# Patient Record
Sex: FEMALE | Race: WHITE | NOT HISPANIC OR LATINO | Employment: FULL TIME | URBAN - METROPOLITAN AREA
[De-identification: names, ages, dates, MRNs, and addresses within clinical notes are randomized per-mention and may not be internally consistent; named-entity substitution may affect disease eponyms.]

---

## 2020-04-06 ENCOUNTER — CLINICAL SUPPORT (OUTPATIENT)
Dept: OTHER | Facility: CLINIC | Age: 45
End: 2020-04-06
Attending: NURSE PRACTITIONER
Payer: COMMERCIAL

## 2020-04-06 DIAGNOSIS — R50.9 FEVER, UNSPECIFIED FEVER CAUSE: ICD-10-CM

## 2020-04-06 DIAGNOSIS — R50.9 FEVER, UNSPECIFIED FEVER CAUSE: Primary | ICD-10-CM

## 2020-04-06 NOTE — PROGRESS NOTES
Patient was processed today at [facility] drive-up clinic.  Pt denies any sort of medical distress today.  After identifying the patient, a nasopharyngeal sample was obtained and placed in viral transport medium.  Pt was given a post-collection education sheet and encouraged to self-isolate until they receive the results.  Pt directed to [facility] website for [facility] Privacy Practices.  Sample sent to the lab noted on the order and requisition.  Pt was without additional questions or concerns and was dispositioned from the testing area to home.

## 2020-04-09 ENCOUNTER — TELEPHONE (OUTPATIENT)
Dept: OBSTETRICS AND GYNECOLOGY | Facility: CLINIC | Age: 45
End: 2020-04-09

## 2020-04-09 LAB
QUEST OVERALL RESULT:: NOT DETECTED
SARS-COV RNA SPEC QL NAA+PROBE: NEGATIVE
SARS-COV-2 RNA RESP QL NAA+PROBE: NEGATIVE
SPECIMEN SOURCE: NORMAL
SYMPTOM: YES

## 2020-04-09 NOTE — TELEPHONE ENCOUNTER
----- Message from Maritza Singleton PT sent at 4/9/2020  8:41 AM EDT -----  I have been unable to reach the patient to relay Negative COVID results. Left message for patient to call back for results.

## 2020-04-09 NOTE — TELEPHONE ENCOUNTER
Called pt and informed her of negative COVID test. Pt is not a pt of mine or the practice. She was very excited by the good news.

## 2020-04-09 NOTE — RESULT ENCOUNTER NOTE
Received message from Dr. Monreal that she contacted patient regarding negative results.   Patient is not a patient at her practice.

## 2020-04-09 NOTE — RESULT ENCOUNTER NOTE
I have been unable to reach the patient to relay Negative COVID results. Left message for patient to call back for results.

## 2020-08-21 ENCOUNTER — TELEPHONE (OUTPATIENT)
Dept: INFECTIOUS DISEASES | Facility: HOSPITAL | Age: 45
End: 2020-08-21

## 2020-08-21 DIAGNOSIS — R50.9 FEVER, UNSPECIFIED FEVER CAUSE: Primary | ICD-10-CM

## 2020-08-21 NOTE — TELEPHONE ENCOUNTER
Please schedule this patient for Covid 19 testing.  I have updated the patient's demographic information with the patient's contact information for scheduling.    Patient having symptoms?: yes  If yes, list symptoms:Fever, Nausea/diarrhea, Aches, Headache and Sore throat    The provider will be placing the order in Epic: no  If no, provider was directed to fax the order to [phone]: Yes    Ordering provider (First - Last): Dr. Mercedes Vega   Provider Best Callback #: [phone]  Fax number (If provider wants results and does not use In Basket): [phone]     This patient is a [employer] Employee: YES/NO/NA: No

## 2020-08-24 ENCOUNTER — CLINICAL SUPPORT (OUTPATIENT)
Dept: OTHER | Facility: CLINIC | Age: 45
End: 2020-08-24
Attending: NURSE PRACTITIONER
Payer: COMMERCIAL

## 2020-08-24 DIAGNOSIS — R50.9 FEVER, UNSPECIFIED FEVER CAUSE: ICD-10-CM

## 2020-08-24 NOTE — PROGRESS NOTES
Patient was processed today at [facility]-19 drive-up clinic.  Pt denies any sort of medical distress today.  After identifying the patient, a nasopharyngeal sample was obtained and placed in viral transport medium.  Pt was given a post-collection education sheet and encouraged to self-isolate until they receive the results.  Pt directed to [facility] website for [facility] Privacy Practices.  Sample sent to the lab noted on the order and requisition.  Pt was without additional questions or concerns and was dispositioned from the testing area to home.

## 2020-08-26 LAB — SARS-COV-2 RNA RESP QL NAA+PROBE: NOT DETECTED

## 2021-03-31 DIAGNOSIS — Z23 ENCOUNTER FOR IMMUNIZATION: ICD-10-CM

## 2021-06-22 ENCOUNTER — TRANSCRIBE ORDERS (OUTPATIENT)
Dept: SCHEDULING | Age: 46
End: 2021-06-22

## 2021-06-22 DIAGNOSIS — R22.9 LOCALIZED SWELLING, MASS AND LUMP, UNSPECIFIED: Primary | ICD-10-CM

## 2021-06-28 ENCOUNTER — HOSPITAL ENCOUNTER (OUTPATIENT)
Dept: RADIOLOGY | Facility: HOSPITAL | Age: 46
Discharge: HOME | End: 2021-06-28
Attending: GENERAL PRACTICE
Payer: COMMERCIAL

## 2021-06-28 DIAGNOSIS — R22.9 LOCALIZED SWELLING, MASS AND LUMP, UNSPECIFIED: ICD-10-CM

## 2021-06-28 PROCEDURE — 76536 US EXAM OF HEAD AND NECK: CPT

## 2021-06-29 ENCOUNTER — TRANSCRIBE ORDERS (OUTPATIENT)
Dept: SCHEDULING | Age: 46
End: 2021-06-29

## 2021-06-29 DIAGNOSIS — R93.89 ABNORMAL FINDINGS ON DIAGNOSTIC IMAGING OF OTHER SPECIFIED BODY STRUCTURES: ICD-10-CM

## 2021-06-29 DIAGNOSIS — R22.9 LOCALIZED SWELLING, MASS AND LUMP, UNSPECIFIED: Primary | ICD-10-CM

## 2021-06-30 ENCOUNTER — TRANSCRIBE ORDERS (OUTPATIENT)
Dept: SCHEDULING | Age: 46
End: 2021-06-30

## 2021-06-30 ENCOUNTER — APPOINTMENT (OUTPATIENT)
Dept: RADIOLOGY | Age: 46
End: 2021-06-30
Payer: COMMERCIAL

## 2021-06-30 ENCOUNTER — HOSPITAL ENCOUNTER (OUTPATIENT)
Dept: RADIOLOGY | Facility: HOSPITAL | Age: 46
Discharge: HOME | End: 2021-06-30
Attending: FAMILY MEDICINE
Payer: COMMERCIAL

## 2021-06-30 VITALS — HEIGHT: 67 IN | BODY MASS INDEX: 22.29 KG/M2 | WEIGHT: 142 LBS

## 2021-06-30 DIAGNOSIS — R93.89 ABNORMAL FINDINGS ON DIAGNOSTIC IMAGING OF OTHER SPECIFIED BODY STRUCTURES: ICD-10-CM

## 2021-06-30 DIAGNOSIS — R22.9 LOCALIZED SWELLING, MASS AND LUMP, UNSPECIFIED: ICD-10-CM

## 2021-06-30 DIAGNOSIS — J45.909 UNSPECIFIED ASTHMA, UNCOMPLICATED: Primary | ICD-10-CM

## 2021-07-13 ENCOUNTER — TRANSCRIBE ORDERS (OUTPATIENT)
Dept: REGISTRATION | Facility: HOSPITAL | Age: 46
End: 2021-07-13

## 2021-07-13 ENCOUNTER — HOSPITAL ENCOUNTER (OUTPATIENT)
Dept: RADIOLOGY | Facility: HOSPITAL | Age: 46
Discharge: HOME | End: 2021-07-13
Attending: GENERAL PRACTICE
Payer: COMMERCIAL

## 2021-07-13 DIAGNOSIS — Z12.31 ENCOUNTER FOR SCREENING MAMMOGRAM FOR MALIGNANT NEOPLASM OF BREAST: Primary | ICD-10-CM

## 2021-07-13 DIAGNOSIS — Z12.31 ENCOUNTER FOR SCREENING MAMMOGRAM FOR MALIGNANT NEOPLASM OF BREAST: ICD-10-CM

## 2021-07-13 PROCEDURE — 77063 BREAST TOMOSYNTHESIS BI: CPT

## 2022-02-08 ENCOUNTER — TRANSCRIBE ORDERS (OUTPATIENT)
Dept: SCHEDULING | Age: 47
End: 2022-02-08

## 2022-02-08 DIAGNOSIS — R51.9 HEADACHE, UNSPECIFIED: Primary | ICD-10-CM

## 2022-02-09 ENCOUNTER — HOSPITAL ENCOUNTER (EMERGENCY)
Facility: HOSPITAL | Age: 47
Discharge: HOME | End: 2022-02-09
Attending: EMERGENCY MEDICINE
Payer: COMMERCIAL

## 2022-02-09 ENCOUNTER — APPOINTMENT (EMERGENCY)
Dept: RADIOLOGY | Facility: HOSPITAL | Age: 47
End: 2022-02-09
Payer: COMMERCIAL

## 2022-02-09 VITALS
HEART RATE: 72 BPM | SYSTOLIC BLOOD PRESSURE: 109 MMHG | TEMPERATURE: 98.9 F | BODY MASS INDEX: 22.76 KG/M2 | DIASTOLIC BLOOD PRESSURE: 51 MMHG | RESPIRATION RATE: 18 BRPM | WEIGHT: 145 LBS | OXYGEN SATURATION: 99 % | HEIGHT: 67 IN

## 2022-02-09 DIAGNOSIS — R51.9 NONINTRACTABLE HEADACHE, UNSPECIFIED CHRONICITY PATTERN, UNSPECIFIED HEADACHE TYPE: Primary | ICD-10-CM

## 2022-02-09 LAB
ALBUMIN SERPL-MCNC: 4.1 G/DL (ref 3.4–5)
ALP SERPL-CCNC: 45 IU/L (ref 35–126)
ALT SERPL-CCNC: 33 IU/L (ref 11–54)
ANION GAP SERPL CALC-SCNC: 10 MEQ/L (ref 3–15)
AST SERPL-CCNC: 29 IU/L (ref 15–41)
BASOPHILS # BLD: 0.05 K/UL (ref 0.01–0.1)
BASOPHILS NFR BLD: 1 %
BILIRUB SERPL-MCNC: 0.4 MG/DL (ref 0.3–1.2)
BUN SERPL-MCNC: 17 MG/DL (ref 8–20)
CALCIUM SERPL-MCNC: 9 MG/DL (ref 8.9–10.3)
CHLORIDE SERPL-SCNC: 104 MEQ/L (ref 98–109)
CO2 SERPL-SCNC: 24 MEQ/L (ref 22–32)
CREAT SERPL-MCNC: 0.7 MG/DL (ref 0.6–1.1)
DIFFERENTIAL METHOD BLD: ABNORMAL
EOSINOPHIL # BLD: 0.04 K/UL (ref 0.04–0.36)
EOSINOPHIL NFR BLD: 0.8 %
ERYTHROCYTE [DISTWIDTH] IN BLOOD BY AUTOMATED COUNT: 11.9 % (ref 11.7–14.4)
GFR SERPL CREATININE-BSD FRML MDRD: >60 ML/MIN/1.73M*2
GLUCOSE SERPL-MCNC: 98 MG/DL (ref 70–99)
HCG UR QL: NEGATIVE
HCT VFR BLDCO AUTO: 41 % (ref 35–45)
HGB BLD-MCNC: 14.3 G/DL (ref 11.8–15.7)
IMM GRANULOCYTES # BLD AUTO: 0.01 K/UL (ref 0–0.08)
IMM GRANULOCYTES NFR BLD AUTO: 0.2 %
LYMPHOCYTES # BLD: 2.45 K/UL (ref 1.2–3.5)
LYMPHOCYTES NFR BLD: 48.4 %
MCH RBC QN AUTO: 34.8 PG (ref 28–33.2)
MCHC RBC AUTO-ENTMCNC: 34.9 G/DL (ref 32.2–35.5)
MCV RBC AUTO: 99.8 FL (ref 83–98)
MONOCYTES # BLD: 0.34 K/UL (ref 0.28–0.8)
MONOCYTES NFR BLD: 6.7 %
NEUTROPHILS # BLD: 2.17 K/UL (ref 1.7–7)
NEUTS SEG NFR BLD: 42.9 %
NRBC BLD-RTO: 0 %
PDW BLD AUTO: 9.8 FL (ref 9.4–12.3)
PLATELET # BLD AUTO: 235 K/UL (ref 150–369)
POTASSIUM SERPL-SCNC: 4.1 MEQ/L (ref 3.6–5.1)
PROT SERPL-MCNC: 7.1 G/DL (ref 6–8.2)
RBC # BLD AUTO: 4.11 M/UL (ref 3.93–5.22)
SARS-COV-2 RNA RESP QL NAA+PROBE: NEGATIVE
SODIUM SERPL-SCNC: 138 MEQ/L (ref 136–144)
WBC # BLD AUTO: 5.06 K/UL (ref 3.8–10.5)

## 2022-02-09 PROCEDURE — U0002 COVID-19 LAB TEST NON-CDC: HCPCS | Performed by: PHYSICIAN ASSISTANT

## 2022-02-09 PROCEDURE — 96361 HYDRATE IV INFUSION ADD-ON: CPT

## 2022-02-09 PROCEDURE — 80053 COMPREHEN METABOLIC PANEL: CPT | Performed by: PHYSICIAN ASSISTANT

## 2022-02-09 PROCEDURE — 36415 COLL VENOUS BLD VENIPUNCTURE: CPT | Performed by: PHYSICIAN ASSISTANT

## 2022-02-09 PROCEDURE — 96375 TX/PRO/DX INJ NEW DRUG ADDON: CPT

## 2022-02-09 PROCEDURE — 25800000 HC PHARMACY IV SOLUTIONS: Performed by: PHYSICIAN ASSISTANT

## 2022-02-09 PROCEDURE — 63600000 HC DRUGS/DETAIL CODE: Performed by: PHYSICIAN ASSISTANT

## 2022-02-09 PROCEDURE — 3E033GC INTRODUCTION OF OTHER THERAPEUTIC SUBSTANCE INTO PERIPHERAL VEIN, PERCUTANEOUS APPROACH: ICD-10-PCS | Performed by: EMERGENCY MEDICINE

## 2022-02-09 PROCEDURE — 70450 CT HEAD/BRAIN W/O DYE: CPT | Mod: MG

## 2022-02-09 PROCEDURE — 63600000 HC DRUGS/DETAIL CODE: Mod: JW | Performed by: PHYSICIAN ASSISTANT

## 2022-02-09 PROCEDURE — 99284 EMERGENCY DEPT VISIT MOD MDM: CPT | Mod: 25

## 2022-02-09 PROCEDURE — 85025 COMPLETE CBC W/AUTO DIFF WBC: CPT | Performed by: PHYSICIAN ASSISTANT

## 2022-02-09 PROCEDURE — 3E023GC INTRODUCTION OF OTHER THERAPEUTIC SUBSTANCE INTO MUSCLE, PERCUTANEOUS APPROACH: ICD-10-PCS | Performed by: EMERGENCY MEDICINE

## 2022-02-09 PROCEDURE — 93005 ELECTROCARDIOGRAM TRACING: CPT | Performed by: PHYSICIAN ASSISTANT

## 2022-02-09 PROCEDURE — 96372 THER/PROPH/DIAG INJ SC/IM: CPT | Mod: 59

## 2022-02-09 PROCEDURE — 3E0333Z INTRODUCTION OF ANTI-INFLAMMATORY INTO PERIPHERAL VEIN, PERCUTANEOUS APPROACH: ICD-10-PCS | Performed by: EMERGENCY MEDICINE

## 2022-02-09 PROCEDURE — 84703 CHORIONIC GONADOTROPIN ASSAY: CPT | Performed by: PHYSICIAN ASSISTANT

## 2022-02-09 PROCEDURE — 3E0337Z INTRODUCTION OF ELECTROLYTIC AND WATER BALANCE SUBSTANCE INTO PERIPHERAL VEIN, PERCUTANEOUS APPROACH: ICD-10-PCS | Performed by: EMERGENCY MEDICINE

## 2022-02-09 PROCEDURE — 96374 THER/PROPH/DIAG INJ IV PUSH: CPT

## 2022-02-09 RX ORDER — KETOROLAC TROMETHAMINE 30 MG/ML
15 INJECTION, SOLUTION INTRAMUSCULAR; INTRAVENOUS ONCE
Status: COMPLETED | OUTPATIENT
Start: 2022-02-09 | End: 2022-02-09

## 2022-02-09 RX ORDER — DEXAMETHASONE SODIUM PHOSPHATE 4 MG/ML
10 INJECTION, SOLUTION INTRA-ARTICULAR; INTRALESIONAL; INTRAMUSCULAR; INTRAVENOUS; SOFT TISSUE ONCE
Status: COMPLETED | OUTPATIENT
Start: 2022-02-09 | End: 2022-02-09

## 2022-02-09 RX ORDER — DIPHENHYDRAMINE HCL 50 MG/ML
25 VIAL (ML) INJECTION ONCE
Status: COMPLETED | OUTPATIENT
Start: 2022-02-09 | End: 2022-02-09

## 2022-02-09 RX ORDER — SUMATRIPTAN 6 MG/.5ML
6 INJECTION, SOLUTION SUBCUTANEOUS ONCE
Status: COMPLETED | OUTPATIENT
Start: 2022-02-09 | End: 2022-02-09

## 2022-02-09 RX ORDER — METOCLOPRAMIDE HYDROCHLORIDE 5 MG/ML
10 INJECTION INTRAMUSCULAR; INTRAVENOUS ONCE
Status: COMPLETED | OUTPATIENT
Start: 2022-02-09 | End: 2022-02-09

## 2022-02-09 RX ADMIN — METOCLOPRAMIDE HYDROCHLORIDE 10 MG: 5 INJECTION INTRAMUSCULAR; INTRAVENOUS at 21:38

## 2022-02-09 RX ADMIN — SODIUM CHLORIDE 1000 ML: 9 INJECTION, SOLUTION INTRAVENOUS at 21:40

## 2022-02-09 RX ADMIN — SUMATRIPTAN 6 MG: 6 INJECTION, SOLUTION SUBCUTANEOUS at 21:39

## 2022-02-09 RX ADMIN — DIPHENHYDRAMINE HYDROCHLORIDE 25 MG: 50 INJECTION INTRAMUSCULAR; INTRAVENOUS at 21:38

## 2022-02-09 RX ADMIN — KETOROLAC TROMETHAMINE 15 MG: 30 INJECTION, SOLUTION INTRAMUSCULAR at 23:31

## 2022-02-09 RX ADMIN — DEXAMETHASONE SODIUM PHOSPHATE 10 MG: 4 INJECTION, SOLUTION INTRA-ARTICULAR; INTRALESIONAL; INTRAMUSCULAR; INTRAVENOUS; SOFT TISSUE at 23:32

## 2022-02-10 ENCOUNTER — HOSPITAL ENCOUNTER (OUTPATIENT)
Dept: RADIOLOGY | Facility: HOSPITAL | Age: 47
Discharge: HOME | End: 2022-02-10
Attending: PHYSICIAN ASSISTANT
Payer: COMMERCIAL

## 2022-02-10 DIAGNOSIS — R51.9 HEADACHE, UNSPECIFIED: ICD-10-CM

## 2022-02-10 LAB
ATRIAL RATE: 66
P AXIS: 74
PR INTERVAL: 160
QRS DURATION: 84
QT INTERVAL: 428
QTC CALCULATION(BAZETT): 448
R AXIS: 80
T WAVE AXIS: 62
VENTRICULAR RATE: 66

## 2022-02-10 RX ORDER — GADOBUTROL 604.72 MG/ML
6.6 INJECTION INTRAVENOUS ONCE
Status: COMPLETED | OUTPATIENT
Start: 2022-02-10 | End: 2022-02-10

## 2022-02-10 RX ADMIN — GADOBUTROL 6.6 ML: 604.72 INJECTION INTRAVENOUS at 09:24

## 2022-02-10 ASSESSMENT — ENCOUNTER SYMPTOMS
SEIZURES: 0
ARTHRALGIAS: 0
PALPITATIONS: 0
COUGH: 0
DYSURIA: 0
SHORTNESS OF BREATH: 0
VOMITING: 0
FEVER: 1
ABDOMINAL PAIN: 0
EYE PAIN: 0
HEADACHES: 1
CHILLS: 0
BACK PAIN: 0
COLOR CHANGE: 0
SORE THROAT: 0
NECK STIFFNESS: 1
NECK PAIN: 1
HEMATURIA: 0

## 2022-02-10 NOTE — ED PROVIDER NOTES
Emergency Medicine Note  HPI   HISTORY OF PRESENT ILLNESS     HPI     This is a 46-year-old female with self-reported history of ADHD who presents for evaluation of a headache x2 weeks.  The patient notes that she does not typically get headaches and cannot recall what she was doing with onset of headache.  Patient notes she had a low-grade temperature of 99.2 °F about 2 weeks ago and a possible Covid exposure from a neighbor.  She took x4 rapid Covid test at home all of which were negative and had a negative PCR test about 1 week ago.  The patient notes last week she had a teledoc appointment and her provider prescribed Fioricet which helped her symptoms 6 days ago.  However, the patient notes that she wakes up every day with a headache which she describes as an 8 out of 10 constant soreness and stiffness on the top of her head and the crown of the head.  Patient notes although Fioricet was helping her previously, she took a dose at 1 PM today and 7:30 PM today without relief of her symptoms.  She does also endorse some neck stiffness and soreness.    Patient denies any present fever/chills, chest pain, shortness of breath, rash, photophobia, sensitivity to sounds, speech disturbance, visual disturbance, numbness, weakness, difficulty ambulating, abdominal pain, nausea, vomiting, diarrhea, rash, any other symptoms.    She does have an outpatient MRI of her brain scheduled for tomorrow.      Patient History   PAST HISTORY     Reviewed from Nursing Triage:  Tobacco  Allergies  Meds  Problems  Med Hx  Surg Hx  Fam Hx  Soc   Hx      History reviewed. No pertinent past medical history.    History reviewed. No pertinent surgical history.    History reviewed. No pertinent family history.    Social History     Tobacco Use   • Smoking status: Never Smoker   • Smokeless tobacco: Never Used   Substance Use Topics   • Alcohol use: Never   • Drug use: Never         Review of Systems   REVIEW OF SYSTEMS     Review of  Systems   Constitutional: Positive for fever. Negative for chills.   HENT: Negative for ear pain and sore throat.    Eyes: Negative for pain and visual disturbance.   Respiratory: Negative for cough and shortness of breath.    Cardiovascular: Negative for chest pain and palpitations.   Gastrointestinal: Negative for abdominal pain and vomiting.   Genitourinary: Negative for dysuria and hematuria.   Musculoskeletal: Positive for neck pain and neck stiffness. Negative for arthralgias and back pain.   Skin: Negative for color change and rash.   Neurological: Positive for headaches. Negative for seizures and syncope.   All other systems reviewed and are negative.        VITALS     ED Vitals    Date/Time Temp Pulse Resp BP SpO2 [facility]   02/09/22 2318 37.2 °C (98.9 °F) 72 18 109/51 99 % FFS   02/09/22 2100 37.2 °C (98.9 °F) 74 18 127/86 100 % JPC   02/09/22 1848 37.4 °C (99.3 °F) 78 20 126/78 100 % SLS        Pulse Ox %: 98 % (02/09/22 2027)  Pulse Ox Interpretation: Normal (02/09/22 2027)  Heart Rate: 78 (02/09/22 2027)  Rhythm Strip Interpretation: Normal Sinus Rhythm (02/09/22 2027)     Physical Exam   PHYSICAL EXAM     Physical Exam  Vitals and nursing note reviewed.   Constitutional:       Appearance: She is well-developed.   HENT:      Head: Normocephalic and atraumatic.   Eyes:      Extraocular Movements: Extraocular movements intact.      Conjunctiva/sclera: Conjunctivae normal.      Pupils: Pupils are equal, round, and reactive to light.   Neck:      Comments: No meningismus  Cardiovascular:      Rate and Rhythm: Normal rate and regular rhythm.   Pulmonary:      Effort: Pulmonary effort is normal.      Breath sounds: Normal breath sounds.   Abdominal:      General: There is no distension.      Palpations: Abdomen is soft. There is no mass.      Tenderness: There is no abdominal tenderness.   Musculoskeletal:         General: No tenderness or deformity. Normal range of motion.      Cervical back: Normal range of  motion and neck supple. No tenderness.   Skin:     General: Skin is warm and dry.      Findings: No rash.   Neurological:      General: No focal deficit present.      Mental Status: She is alert and oriented to person, place, and time. Mental status is at baseline.      Comments: Mental status: A/Ox3  CN II-XII tested and intact.  Sensation intact to sharp/dull differentiation in all extremities.  Motor: Normal tone and bulk. No abnormal movements appreciated. No pronator drift. Strength tested and 5/5 in bilateral wrist flexion/extension, elbow flexion/extension, shoulder abduction, straight leg raise, knee flexion/extension, ankle dorsiflexion/plantarflexion. Patient ambulates with a steady gait.  Coordination: Finger to nose and heel to shin testing intact bilaterally.   Psychiatric:         Behavior: Behavior normal.           PROCEDURES     Procedures     DATA     Results     Procedure Component Value Units Date/Time    [place] Draw Panel [[id number]] Collected: 02/09/22 1854    Specimen: Blood, Venous Updated: 02/10/22 0300    Narrative:      The following orders were created for panel order [place] Draw Panel.  Procedure                               Abnormality         Status                     ---------                               -----------         ------                     RAINBOW RED[[id number]]                                        Final result               RAINBOW LT BLUE[[id number]]                                    Final result               RAINBOW GOLD[[id number]]                                       Final result                 Please view results for these tests on the individual orders.    RAINBOW RED [[id number]] Collected: 02/09/22 1854    Specimen: Blood, Venous Updated: 02/10/22 0300    RAINBOW LT BLUE [[id number]] Collected: 02/09/22 1854    Specimen: Blood, Venous Updated: 02/10/22 0300    RAINBOW GOLD [[id number]] Collected: 02/09/22 1854    Specimen: Blood, Venous Updated: 02/10/22 0300     SARS-CoV-2 (COVID-19), PCR Nasopharynx [[id number]]  (Normal) Collected: 02/09/22 2124    Specimen: Nasopharyngeal Swab from Nasopharynx Updated: 02/09/22 2203    Narrative:      The following orders were created for panel order SARS-CoV-2 (COVID-19), PCR Nasopharynx.  Procedure                               Abnormality         Status                     ---------                               -----------         ------                     SARS-CoV-2 (COVID-19), P...[[id number]]  Normal              Final result                 Please view results for these tests on the individual orders.    SARS-CoV-2 (COVID-19), PCR Nasopharynx [[id number]]  (Normal) Collected: 02/09/22 2124    Specimen: Nasopharyngeal Swab from Nasopharynx Updated: 02/09/22 2203     SARS-CoV-2 (COVID-19) Negative    BhCG, Serum, Qual [[id number]]  (Normal) Collected: 02/09/22 1854    Specimen: Blood, Venous Updated: 02/09/22 2103     Preg Test, Serum Negative    Comprehensive metabolic panel [[id number]]  (Normal) Collected: 02/09/22 1854    Specimen: Blood, Venous Updated: 02/09/22 1920     Sodium 138 mEQ/L      Potassium 4.1 mEQ/L      Comment: Results obtained on plasma. Plasma Potassium values may be up to 0.4 mEQ/L less than serum values. The differences may be greater for patients with high platelet or white cell counts.        Chloride 104 mEQ/L      CO2 24 mEQ/L      BUN 17 mg/dL      Creatinine 0.7 mg/dL      Glucose 98 mg/dL      Calcium 9.0 mg/dL      AST (SGOT) 29 IU/L      ALT (SGPT) 33 IU/L      Alkaline Phosphatase 45 IU/L      Total Protein 7.1 g/dL      Comment: Test performed on plasma which typically contains approximately 0.4 g/dL more protein than serum.        Albumin 4.1 g/dL      Bilirubin, Total 0.4 mg/dL      eGFR >60.0 mL/min/1.73m*2      Anion Gap 10 mEQ/L     CBC and differential [[id number]]  (Abnormal) Collected: 02/09/22 1854    Specimen: Blood, Venous Updated: 02/09/22 1906     WBC 5.06 K/uL      RBC 4.11 M/uL      Hemoglobin  14.3 g/dL      Hematocrit 41.0 %      MCV 99.8 fL      MCH 34.8 pg      MCHC 34.9 g/dL      RDW 11.9 %      Platelets 235 K/uL      MPV 9.8 fL      Differential Type Auto     nRBC 0.0 %      Immature Granulocytes 0.2 %      Neutrophils 42.9 %      Lymphocytes 48.4 %      Monocytes 6.7 %      Eosinophils 0.8 %      Basophils 1.0 %      Immature Granulocytes, Absolute 0.01 K/uL      Neutrophils, Absolute 2.17 K/uL      Lymphocytes, Absolute 2.45 K/uL      Monocytes, Absolute 0.34 K/uL      Eosinophils, Absolute 0.04 K/uL      Basophils, Absolute 0.05 K/uL           Imaging Results          CT HEAD WITHOUT IV CONTRAST (Final result)  Result time 02/09/22 20:33:54    Final result                 Impression:    IMPRESSION:  No evidence for acute hemorrhage, mass, mass effect, or acute territorial  infarction.             Narrative:    CLINICAL HISTORY: Persistent dizziness.    COMMENT: CT examination of the brain is performed without intravenous contrast  administration utilizing 2.5 mm axial sections.    CT DOSE:  The kV and/or mA were adjusted according to the patient's size and  body part for CT dose reduction    There are no prior examinations available for comparison.    The ventricles, sulci, and basal cisterns are normal in size, shape, and  configuration.  There is no gross loss of gray-white matter discrimination to  suggest an acute large vascular territory infarction.  Atherosclerotic  intracranial vascular calcifications are noted.  There is no evidence for  intraparenchymal hemorrhage, mass-effect, or midline shift. There are no  extra-axial fluid collection. The visualized portions of the paranasal sinuses  and mastoid air cells are clear. The orbits are within normal limits.                                ECG 12 lead   ED Interpretation   Reviewed with attending. RH    Rhythm: [NSR]  Rate: 66  P waves: [normal interval]  QRS: [normal QRS]  Axis: [normal]  ST Segments: [no obvious ST elevation or  ischemia]    Impression: Normal sinus rhythm; normal ECG      Final Result          Scoring tools                                 ED Course & MDM   MDM / ED COURSE / CLINICAL IMPRESSIONS / DISPO     [facility]    ED Course as of 02/10/22 1549   Wed Feb 09, 2022 2052 CT HEAD WITHOUT IV CONTRAST  IMPRESSION:  No evidence for acute hemorrhage, mass, mass effect, or acute territorial  infarction. [RH]   2102 Discussed with charge RN; pt will get bed for meds [RH]   2304 H/a much improved. Pt would like to be discharged home. Pt presents with headache. No focal neurological symptoms. Neuro exam is benign. Pt is nontoxic. VSS.    Based on history and normal neurological exam I have low suspicion for intracranial tumor, intracranial bleed, meningitis, temporal arteritis, glaucoma, CO poisoning, other emergent/life-threatening causes.    Most likely patient has benign headache, recommend rest, hydration, and over the counter pain medication.  Prior to noncontrast head CT imaging, I did discuss risk of radiation versus benefits of imaging and although patient has an MRI scheduled for tomorrow morning, she is amenable to CT scan imaging tonight knowing risks of radiation. Attending also evaluated patient.    Disposition: Discharge home with strict return precautions and instructions for prompt primary care and headache specialist follow up. [RH]      ED Course User Index  [RH] Dafne Buitrago PA C         Clinical Impressions as of 02/10/22 1549   Nonintractable headache, unspecified chronicity pattern, unspecified headache type     Discharge         Dafne Buitrago PA C  02/10/22 1543

## 2022-02-10 NOTE — DISCHARGE INSTRUCTIONS
Please call your primary care doctor and the headache specialist today to inform them of your ER visit today and arrange a follow up appointment within 2 days. Please return immediately to the emergency department for any new/worsening or concerning symptoms. We offered to continue observing you and give more medications for your headache; however, you would like to be discharged at this time and feel improvement in your symptoms.

## 2022-02-13 NOTE — ED ATTESTATION NOTE
Procedures  Physical Exam  Review of Systems    2/13/202212:08 PM  I have personally seen and examined the patient.  I reviewed and agree with the PA/NP/Resident's assessment and plan of care.    My examination, assessment, and plan of care of Chel Price is as follows:  The patient presents with hx of ADHD p/w ongoing HA x 2 weeks. No falls/ trauma. No fevers. Had recent negative COVID test. Pt is scheduled for MRI tomorrow. Has been taking fioricet for headaches without relief. No vision changes, numbness, or weakness.  Exam: vitals wnl, RRR, CTAB, neck supple, abd s/nt/nd. Equal pulses. No edema. No rashes. nonfocal neuro exam.  Impression/Plan: Suspect tension headache vs migraine. Do not suspect CNS infection based on history/ exam. Doubt viral syndrome. Doubt CVA/ malignancy. Plan head CT, migraine cocktail and plan outpatient PCP f/u, MRI as planned if workup here is wnl and headache improves.    Vital Signs Review: Vital signs have been reviewed. The oxygen saturation is  SpO2: 100 % which is  wnl.    I was physically present for the key/critical portions of the following procedures: None    This document was created using dragon dictation software.  There might be some typographical errors due to this technology.    We are in a pandemic with increased volumes and decreased capacity.      Vern Houston MD  02/13/22 [id number]

## 2022-05-23 ENCOUNTER — TELEPHONE (OUTPATIENT)
Dept: ADMISSIONS | Facility: HOSPITAL | Age: 47
End: 2022-05-23
Payer: COMMERCIAL

## 2022-05-23 NOTE — TELEPHONE ENCOUNTER
[facility] Initial Intake    Chel Price, a 46 y.o. female.  [facility] Intake           General  Reason for seeking services (in client's own words)?: Pt is struggling she goes to individual therapy and psychiatry but needs a little more help. Her one son has aspergers in March he was diagnosed and with ADHD at 4 years old. He is very challenging. She is a single parents    Mental Health History  Mental Health History: Yes  Depression: Decreased Sleep, Decreased Appetite, Decreased Energy (lethargy, so much going through her mind. Nothing going on except work and her son. No social life, no friends. very stressful)         Suicide Thoughts/Plans  Have you had suicidal thoughts in the past 72 hours?: No  Have you ever had suicidal thoughts?: No  Have you ever harmed yourself?: No  Do you have easy access to firearms?: No    Violence/Trauma  Do you currently have, or have you ever had, thoughts of harming someone else?: No  Any history of an event that you would consider emotionally/psychologically/physically traumatic?: she suffers from PTSD -and she was treated for it in  related to work situation for a psyo path who went to half-way     Period  Are you seeking treatment at the [facility] related to the  period (before, during, after pregnancy/adoption)? : No    Pregnancy/Breastfeeding  Are you pregnant?: No  Are you currently breastfeeding or bottle feeding?: No    Substance Use Details:   Substance Use Includes:: Alcohol  How long have you been using at current rate?: She used to drink but now on antabuse so she does not drink. She last drank in 2022 a bottle of wine 5 days in a row.  Longest period of non-use? When?: Has not drank since 2022  Alcohol  Select one: Primary  Method of use: Oral    Substance Use Treatment History  Are you currently experiencing, or have you ever experienced, withdrawal symptoms?: No  Prior treatment reported?: No    Eating Disorders  Do you have any problematic food  related behaviors?: Yes  Details: in 20s she had bulumia and got help  Have you had any prior treatment?: Yes  If yes, insert comments: she did not do outpatient or inpatient but went to a therapist    Additional Information  How would you describe your gender identity?: Female  Determination: [facility]      Referring Facility:     Referring Facility Comments:     Documentation Requested: No  Documentation Comments:    Other Referral Source: Internet Search  Other Referral Source Comments:

## 2022-05-27 ENCOUNTER — PHP (OUTPATIENT)
Dept: PSYCHIATRY | Facility: HOSPITAL | Age: 47
End: 2022-05-27
Payer: COMMERCIAL

## 2022-05-27 ENCOUNTER — PHP (OUTPATIENT)
Dept: PSYCHIATRY | Facility: HOSPITAL | Age: 47
End: 2022-05-27
Attending: PSYCHIATRY & NEUROLOGY
Payer: COMMERCIAL

## 2022-05-27 DIAGNOSIS — F33.2 MAJOR DEPRESSIVE DISORDER, RECURRENT SEVERE WITHOUT PSYCHOTIC FEATURES (CMS/HCC): Primary | ICD-10-CM

## 2022-05-27 DIAGNOSIS — F41.1 GAD (GENERALIZED ANXIETY DISORDER): ICD-10-CM

## 2022-05-27 DIAGNOSIS — F10.11 ALCOHOL USE DISORDER, MILD, IN EARLY REMISSION: ICD-10-CM

## 2022-05-27 DIAGNOSIS — F50.20 BULIMIA NERVOSA: ICD-10-CM

## 2022-05-27 PROCEDURE — 90792 PSYCH DIAG EVAL W/MED SRVCS: CPT | Performed by: PSYCHIATRY & NEUROLOGY

## 2022-05-27 PROCEDURE — 90791 PSYCH DIAGNOSTIC EVALUATION: CPT

## 2022-05-27 RX ORDER — CYCLOBENZAPRINE HCL 5 MG
5 TABLET ORAL 3 TIMES DAILY PRN
COMMUNITY

## 2022-05-27 RX ORDER — DISULFIRAM 250 MG/1
250 TABLET ORAL DAILY
COMMUNITY

## 2022-05-27 RX ORDER — B2/MAGNESIUM CIT,OXID/FEVERFEW 200-180-50
TABLET ORAL
COMMUNITY

## 2022-05-27 RX ORDER — ESCITALOPRAM OXALATE 20 MG/1
20 TABLET ORAL DAILY
COMMUNITY
End: 2022-06-10 | Stop reason: SDUPTHER

## 2022-05-27 RX ORDER — HYDROXYZINE PAMOATE 25 MG/1
CAPSULE ORAL
Qty: 30 CAPSULE | Refills: 0 | Status: SHIPPED | OUTPATIENT
Start: 2022-05-27 | End: 2022-06-10

## 2022-05-27 RX ORDER — METRONIDAZOLE 7.5 MG/G
GEL TOPICAL 2 TIMES DAILY
COMMUNITY

## 2022-05-27 ASSESSMENT — COGNITIVE AND FUNCTIONAL STATUS - GENERAL
SLEEP_WAKE_CYCLE: DECREASED
ORIENTATION: FULLY ORIENTED
EYE_CONTACT: WNL
RECENT MEMORY: WNL
AROUSAL LEVEL: ALERT
INSIGHT: IMPAIRED, MINIMALLY
IMPULSE CONTROL: IMPAIRED, MODERATELY
ATTENTION: IMPAIRED, MILD
EST. PREMORBID INTELLIGENCE: ABOVE AVERAGE
PERCEPTUAL FUNCTION: PAIN
MOOD: DEPRESSED;ANXIOUS
THOUGHT_PROCESS: TANGENTIAL
PSYCHOMOTOR FUNCTIONING: WNL
CONCENTRATION: IMPAIRED, MILD
AFFECT: TEARFUL;LABILE
APPETITE: NO CHANGE
DELUSIONS: NONE OR AGE APPROPRIATE
REMOTE MEMORY: WNL
APPEARANCE: WELL GROOMED
LIBIDO: NON-CONTRIBUTORY
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
## 2022-05-27 NOTE — PROGRESS NOTES
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 medical issues, Trauma, follow up provided by [facility].  Trauma will be addressed in PHP and with existing OP providers and Pain, Pt will continue to follow up with PCP as needed for back pain    Plan:    Patient to F/U with PHP.  Patient to F/U with treatment goals as outlined in treatment plan.  Patient to F/U with local ED or call 911 should SI/HI arise.  Visit Diagnosis:    ICD-10-CM ICD-9-CM   1. Major depressive disorder, recurrent severe without psychotic features (CMS/HCC)  F33.2 296.33   2. NORMA (generalized anxiety disorder)  F41.1 300.02   3. Alcohol use disorder, mild, in early remission  F10.11 305.03   4. Bulimia nervosa  F50.2 307.51       Time  Start Time: 0830  End Time: 1100  Total Time: 150  Vilma Gray, ZAFAR @ 3:57 PM    "

## 2022-05-31 ENCOUNTER — NURSE ONLY (OUTPATIENT)
Dept: PSYCHIATRY | Facility: HOSPITAL | Age: 47
End: 2022-05-31
Payer: COMMERCIAL

## 2022-05-31 ENCOUNTER — PHP (OUTPATIENT)
Dept: PSYCHIATRY | Facility: HOSPITAL | Age: 47
End: 2022-05-31
Attending: PSYCHIATRY & NEUROLOGY
Payer: COMMERCIAL

## 2022-05-31 VITALS
HEIGHT: 67 IN | HEART RATE: 77 BPM | DIASTOLIC BLOOD PRESSURE: 80 MMHG | SYSTOLIC BLOOD PRESSURE: 119 MMHG | RESPIRATION RATE: 16 BRPM | OXYGEN SATURATION: 100 % | BODY MASS INDEX: 23.51 KG/M2 | WEIGHT: 149.8 LBS

## 2022-05-31 DIAGNOSIS — F33.2 MAJOR DEPRESSIVE DISORDER, RECURRENT SEVERE WITHOUT PSYCHOTIC FEATURES (CMS/HCC): Primary | ICD-10-CM

## 2022-05-31 DIAGNOSIS — F10.11 ALCOHOL USE DISORDER, MILD, IN EARLY REMISSION: ICD-10-CM

## 2022-05-31 DIAGNOSIS — F41.1 GAD (GENERALIZED ANXIETY DISORDER): ICD-10-CM

## 2022-05-31 DIAGNOSIS — F50.20 BULIMIA NERVOSA: ICD-10-CM

## 2022-05-31 PROCEDURE — H0035 MH PARTIAL HOSP TX UNDER 24H: HCPCS

## 2022-06-01 ENCOUNTER — TELEPHONE (OUTPATIENT)
Dept: PSYCHIATRY | Facility: HOSPITAL | Age: 47
End: 2022-06-01
Payer: COMMERCIAL

## 2022-06-01 ENCOUNTER — PHP (OUTPATIENT)
Dept: PSYCHIATRY | Facility: HOSPITAL | Age: 47
End: 2022-06-01
Attending: PSYCHIATRY & NEUROLOGY
Payer: COMMERCIAL

## 2022-06-01 DIAGNOSIS — F41.1 GAD (GENERALIZED ANXIETY DISORDER): ICD-10-CM

## 2022-06-01 DIAGNOSIS — F33.2 MAJOR DEPRESSIVE DISORDER, RECURRENT SEVERE WITHOUT PSYCHOTIC FEATURES (CMS/HCC): Primary | ICD-10-CM

## 2022-06-01 DIAGNOSIS — F10.11 ALCOHOL USE DISORDER, MILD, IN EARLY REMISSION: ICD-10-CM

## 2022-06-01 DIAGNOSIS — F50.20 BULIMIA NERVOSA: ICD-10-CM

## 2022-06-01 PROCEDURE — H0035 MH PARTIAL HOSP TX UNDER 24H: HCPCS

## 2022-06-01 NOTE — GROUP NOTE
Date: 6/1/2022  Start Time:   9:30 AM  End Time: 10:30 AM  Chel Price, YOB: 1975,  was an active group participant.    Community Check In Group  5 attended group today.     Group Focus: Goal of group was to welcome new and returning PHP members to the [facility], check in regarding group member needs, and assess safety.    About the Group: Clinician reviewed [facility] Group Code of Conduct and answered any questions that arose.  Clinician welcomed new members to the group and facilitated brief introductions of group members to one another.  Introduced topic/theme for the treatment day:Trauma.  Encouraged group members to request support throughout the day as needed.  Clinician reviewed group members’ Morning Reflection Sheets and did a verbal check in with each group member regarding safety (SI/HI/self harm), safety planning, medication compliance, if they needed to consult with anyone today and individual treatment needs/goals for the day.  Session ended with a brief meditation/calming activity.   Marlene Burris MD was on site when services rendered.    Morning Self reflection:   Depression: 3/5  Anxiety: 4/5  Anger: 2/5  Suicidal Ideation:   0/5 - None  Self Injury: 0/5 - None  Engaged in Self Injury since yesterday: No  Homicidal Ideation:   0/5 - None  Are you able to follow your Safety Plan? Yes.  I can/will:  Call someone, Journal, Use a coping skill and Call 911 if I'm unsafe.  Substance Use:  No  Self Care:  I completed my self care activity last night:  exercised while watching [employer]  I am satisfied with my daily hygiene:  yes  Nourish:Number of meals eaten yesterday? 3 and Describe:  Normal    Sleep:  Uses sleep aid? yes    and Describe:  Broken, Restless and Number of hours:  5 - worried cat was dying  Social Interaction:Minimal:  Family and Friends  Physical Activity: walked on tredmil  Mindful Activity: watered yard  Medication: Prescribed  Thought Content: cloudy, irritable  Pt reported use of  coping skills including successful follow through and barriers to follow through: exercised in spite of tasks  Pt reported significant or positive event/step: called mom for support when overwhlmed  Pt identified goal for the treatment day: overcome trauma of the past especially when they are unusual  Pt treatment plan areas addressed:  Depression, Anxiety, Low Self Esteem, Relationship issues/Communication skills, Social Isolation, Self Care, Impulse Control, Mood dysregulation, Parenting Stress, Trauma, Sleep Disturbance, Coping Skills and Building strengths/resilience  Pt requested consult with: Therapist   Visit Diagnosis:      ICD-10-CM ICD-9-CM   1. Major depressive disorder, recurrent severe without psychotic features (CMS/HCC)  F33.2 296.33   2. NORMA (generalized anxiety disorder)  F41.1 300.02   3. Alcohol use disorder, mild, in early remission  F10.11 305.03   4. Bulimia nervosa  F50.2 307.51         Assessment/Observations:  Chel shared about an upsetting night thinking her cat was going to die imminently and it disrupting her limited sleep.  Stress remains high caring for her son.  Pt asked LPC to chat about being upset about being asked to share how many times she has been pregnant from the nurse.  LPC validated pt and reality checked the facts about how this is a core part of our programming and that we can do better in being sensitive to Teresa needs.  She was grateful.   Plan:  Continue with PHP   Pt to F/U with treatment goals as outlined in treatment plan.  Pt to F/U with local ED/call 911 should SI/HI arises.    Paula Vee, LPC

## 2022-06-01 NOTE — GROUP NOTE
Date: 6/1/2022  Start Time:  1:20 PM  End Time:   2:20 PM  Chel Price, YOB: 1975,  was an active group participant.    Wrap Up Group  5 attended group today.   Group Focus: Goal of group was to wrap up and process the treatment day.  Assist  members in planning for the evening ahead and to assess and plan surrounding any  safety needs.      About the Group:  Clinician reviewed group members Afternoon  Reflection Sheets and did a verbal check in with each group member regarding safety  (SI/HI/self harm) and any necessary safety planning .  Session ended with a brief  meditation/calming activity.  Marlene Burris MD was onsite when  services rendered.        Afternoon Self Reflection  Depression: 2/5  Anxiety: 2/5  Anger: 0/5  Suicidal Ideation:   0/5 - None  Self Injury: 0/5 - None  Homicidal Ideation:   0/5 - None  Are you able to follow your Safety Plan? Yes.  I can/will:  Call someone, Journal, Use a coping skill, Use grounding with my 5 senses and Call 911 if I'm unsafe.  Pt reported significant moment/insight of the day: There's no comparing trauma effects based on the events, it's how the events make me feel, my trauma qualifies  Pt reported gratitude list: Cat Dwayne seemed stable when I left today and I had a good cry with him and night-long cuddle 2) two new friends reached out and are supportive 3) Stanton has almost finished  (wasn't sure he'd make it)  Pt identified goal progress for the day: yes, better understand how my traumas impact me physically and it doesn't serve me to compare my traumas. Glad to be learning about tools to help me reduce my PTSD anxiety  Pt reported evening self care plan:acupuncture and doing nothing during the treatment  Pt treatment plan areas addressed: Depression, Anxiety, Trauma and Coping Skills    Visit Diagnosis:      ICD-10-CM ICD-9-CM   1. Major depressive disorder, recurrent severe without psychotic features (CMS/HCC)  F33.2 296.33   2. NORMA  (generalized anxiety disorder)  F41.1 300.02   3. Alcohol use disorder, mild, in early remission  F10.11 305.03   4. Bulimia nervosa  F50.2 307.51       Assessment/Observations:  Chel's affect was labile during the group with her making jokes and laughing in a self-deprecating way at times and tearful in sharing about her cat's impending death. She reports feeling improvements in depression throughout the day but larger anxieties about her direction in life (which she minimized by making a joke). The group normalized pt's anticipatory grief about her pet after Chel expressed self-consciousness/guilt about the level of sadness she is experiencing for an animal.  Plan: Continue with PHP   Pt to F/U with treatment goals as outlined in treatment plan.  Pt to F/U with local ED/call 911 should SI/HI arises.    Vilma Gray LCSW

## 2022-06-01 NOTE — TELEPHONE ENCOUNTER
LPC left VM for pt's OP provider, Dr. Demond Garza.  LPC provided contact information for Dr. Garza to return the call.

## 2022-06-01 NOTE — TELEPHONE ENCOUNTER
LPC left VM for pt's mother, Madeline, who is pt's CC for PHP tx. Pt is aware that LPC was going to call her mother and leave contact information. LPC provided [facility] contact info for pt's mother to return the call.

## 2022-06-01 NOTE — GROUP NOTE
"Date:  6/1/2022  Start Time:  10:40 AM  End Time:  11:40 AM  Chel Price, YOB: 1975   5 members attended group today.    Group Focus:  Goal of group was to establish and build social support, review coping skills, normalize the struggles of being human, and increase self awareness and self compassion.    About the Group:  Clinician started group with a prompting quote/song to facilitate group discussion.  Group engaged in active and supportive discussion. Topics discussed included trauma, resilience, self compassion, brain chemistry, interpersonal effectiveness, distress tolerance and emotion regulation.  Group members were able to offer insightful and supportive feedback and suggestions about how to manage difficult situations.  Group ended with a meditation/song.  Marlene Burris MD was onsite when services were rendered.        Patient  was an active group participant.  Assessment/observation of group participation/presentation: Chel related to peers sharing and to the discussions on trauma responses.  Chel shared her self invalidation that she is too much for other people and no one would understand or believe her trauma.  She was receptive to LPC challenging this thinking.   Addressed pt feeling that have no community and how it is an emotional cost to build community.  Invited pt to be selective as ot how she \"spends\" her emotional energy and seek opportunity to build up her cache.   Treatment plan areas addressed: Depression, Anxiety, Low Self Esteem, Relationship issues/Communication skills, Social Isolation, Self Care, Impulse Control, Mood dysregulation, Parenting Stress, Trauma, Coping Skills and Building strengths/resilience  Visit Diagnosis:      ICD-10-CM ICD-9-CM   1. Major depressive disorder, recurrent severe without psychotic features (CMS/HCC)  F33.2 296.33   2. NORMA (generalized anxiety disorder)  F41.1 300.02   3. Alcohol use disorder, mild, in early remission  F10.11 305.03 "   4. Bulimia nervosa  F50.2 307.51       Plan: Continue with PHP   Pt to F/U with treatment goals as outlined in treatment plan.  Pt to F/U with local ED/call 911 should SI/HI arises.    Paula Vee, LPC

## 2022-06-01 NOTE — GROUP NOTE
Date:  6/1/2022  Start Time:  12:10 PM  End Time:   1:10 PM  Chel Price, YOB: 1975  Psychoeducational/Skills Based Group  5 members attended group today    Group Focus: Goal of group was to introduce skills and psychoeducation related to topic of PHP day.   Group members were provided instruction and opportunities to practice presented skills.  Clinician answered questions as they arose and shared how presented skills can be utilized on a daily basis to increase emotional wellness.      About the Group: Trauma (day 1) Session 3: Trauma (Day 1) Psycho-Ed/Skills Based Group Summary  Topic of curriculum was “Trauma”. Clinician began group by briefly reviewing description of trauma from group 1. Clinician provided psychoeducation about the body’s response to trauma using a Fight or Flight handout. Clinician introduced two techniques used in the context of trauma to help a person to stay in the present moment and cope with triggers. The first technique is called “Grounding” and was described using a worksheet from [employer]. The second is a technique called “EFT tapping” and was illustrated using a YouTube video and paired with a worksheet on tapping points. Clinician then led group members through an open discussion on their experience with the two techniques and usefulness in managing triggers. Clinician ended group with a grounding meditation from [employer].    Marlene Burris MD was onsite when services were rendered.          Patient  was an active group participant.  Assessment/observation of group participation/presentation: Pt was engaged in group session and willing to share about how her stress and anxiety manifest physically for her. Pt was willing to engage in experiential exercises to help with grounding and calming the ANS. Pt shared that she has done EMDR with her psychologist as well and this has been very helpful.   Treatment plan areas addressed: Depression, Anxiety, Self Care,  Coping Skills, Mindfulness, CBT skills, Building strengths/resilience, Self-compassion and Other: SUDS   Visit Diagnosis:      ICD-10-CM ICD-9-CM   1. Major depressive disorder, recurrent severe without psychotic features (CMS/HCC)  F33.2 296.33   2. NORMA (generalized anxiety disorder)  F41.1 300.02   3. Alcohol use disorder, mild, in early remission  F10.11 305.03   4. Bulimia nervosa  F50.2 307.51       Plan: Continue with PHP   Pt to F/U with treatment goals as outlined in treatment plan.  Pt to F/U with local ED/call 911 should SI/HI arises.    Cheryl Mcbride, LPC

## 2022-06-02 ENCOUNTER — PHP (OUTPATIENT)
Dept: PSYCHIATRY | Facility: HOSPITAL | Age: 47
End: 2022-06-02
Attending: PSYCHIATRY & NEUROLOGY
Payer: COMMERCIAL

## 2022-06-02 DIAGNOSIS — F50.20 BULIMIA NERVOSA: ICD-10-CM

## 2022-06-02 DIAGNOSIS — F41.1 GAD (GENERALIZED ANXIETY DISORDER): ICD-10-CM

## 2022-06-02 DIAGNOSIS — F33.2 MAJOR DEPRESSIVE DISORDER, RECURRENT SEVERE WITHOUT PSYCHOTIC FEATURES (CMS/HCC): Primary | ICD-10-CM

## 2022-06-02 DIAGNOSIS — F10.11 ALCOHOL USE DISORDER, MILD, IN EARLY REMISSION: ICD-10-CM

## 2022-06-02 PROCEDURE — H0035 MH PARTIAL HOSP TX UNDER 24H: HCPCS | Performed by: COUNSELOR

## 2022-06-02 NOTE — GROUP NOTE
Date:  6/2/2022  Start Time:  12:10 PM  End Time:   1:10 PM  Chel Price, YOB: 1975  Psychoeducational/Skills Based Group  7 members attended group today    Group Focus: Goal of group was to introduce skills and psychoeducation related to topic of PHP day.   Group members were provided instruction and opportunities to practice presented skills.  Clinician answered questions as they arose and shared how presented skills can be utilized on a daily basis to increase emotional wellness.      About the Group: CBT Session 4: CBT (Cognitive Behavioral Therapy) Psycho-Ed/Skills Based Group   Topic of curriculum was “CBT (Cognitive Behavioral Therapy)”. Clinician began group by briefly reviewing description of CBT from group 1. Clinician showed a video entitled “Parable of the Two Wolves” to describe how our cognitions, both positive and negative are strengthened or intensified when we give them attention and the power of mindfulness. Clinician provided psychoeducation about “core beliefs” and “cognitive distortions” and discussed how core beliefs impact our current thoughts, feelings and behaviors. Clinician provided a core beliefs worksheet as a way of helping group members challenge the negative thoughts associated with negative core beliefs. Clinician then introduced the “ATR worksheet” as a way of describing the relationship between thoughts, feelings and behaviors (cognitive triangle) and the process of challenging and reframing thoughts. Clinician also introduced “ANTS” worksheet to further describe the process of reframing thoughts. Clinician then facilitated an open discussion on CBT concepts presented and assessed for group member understanding. Clinician ended group by showing a YouTube video of [employer]s [employer] show and an interview with Remi Szymanski.    Marlene Burris MD was onsite when services were rendered.          Patient  was an active group participant.  Assessment/observation of  group participation/presentation: Chel was engaged, sharing about her experience with cognitive distortions and thinking she has more control in situations than she does- relating ot hindsight bias.   Treatment plan areas addressed: Depression, Anxiety, Mood dysregulation, Parenting Stress and CBT skills  Visit Diagnosis:      ICD-10-CM ICD-9-CM   1. Major depressive disorder, recurrent severe without psychotic features (CMS/HCC)  F33.2 296.33   2. NORMA (generalized anxiety disorder)  F41.1 300.02   3. Alcohol use disorder, mild, in early remission  F10.11 305.03   4. Bulimia nervosa  F50.2 307.51       Plan: Continue with PHP   Pt to F/U with treatment goals as outlined in treatment plan.  Pt to F/U with local ED/call 911 should SI/HI arises.    Paula Vee, LPC

## 2022-06-02 NOTE — GROUP NOTE
Date: 6/2/2022  Start Time:   9:30 AM  End Time: 10:30 AM  Chel Price, YOB: 1975,  was an active group participant.    Community Check In Group  7 attended group today.     Group Focus: Goal of group was to welcome new and returning PHP members to the [facility], check in regarding group member needs, and assess safety.    About the Group: Clinician reviewed [facility] Group Code of Conduct and answered any questions that arose.  Clinician welcomed new members to the group and facilitated brief introductions of group members to one another.  Introduced topic/theme for the treatment day:CBT.  Encouraged group members to request support throughout the day as needed.  Clinician reviewed group members’ Morning Reflection Sheets and did a verbal check in with each group member regarding safety (SI/HI/self harm), safety planning, medication compliance, if they needed to consult with anyone today and individual treatment needs/goals for the day.  Session ended with a brief meditation/calming activity.   Marlene Burris MD was on site when services rendered.    Morning Self reflection:   Depression: 3/5  Anxiety: 5/5  Anger: 0/5  Suicidal Ideation:   0/5 - None  Self Injury: 0/5 - None  Engaged in Self Injury since yesterday: No  Homicidal Ideation:   0/5 - None  Are you able to follow your Safety Plan? Yes.  I can/will:  Call someone, Journal, Use a coping skill, Use grounding with my 5 senses and Call 911 if I'm unsafe.  Substance Use:  No  Self Care:  I completed my self care activity last night:  acupuncture, exercise, quality time with son and cats  I am satisfied with my daily hygiene:  yes  Nourish:Number of meals eaten yesterday? 3 and Describe:  Normal  Not enough fruit/veg  Sleep:  Uses sleep aid? yes    and Describe:  Broken, Restless, Number of hours:  5-6 and sleep aids not helping  Social Interaction:Minimal:  Family and Friends  Physical Activity: Walk on treadmill  Mindful Activity: Watching movie  undistracted with son, caring for cats  Medication: Prescribed  Thought Content: clear, cloudy, scattered  Pt reported use of coping skills including successful follow through and barriers to follow through: Joined cat cancer FB group, helped me feel calmer and more reassured at bedtime. Proud to have exercised  Pt reported significant or positive event/step: Good text exchanges with friends, connecting and feeling supported, exercised even though only 20 mins  Pt identified goal for the treatment day: What can I do to calm mind and not worry about things that aren't critically important  Pt treatment plan areas addressed:  Depression, Anxiety, Grief and Loss, Social Isolation, Chronic Pain/Medical Issues, Mood dysregulation, Parenting Stress, Coping Skills and Daily functioning  Pt requested consult with: Psychiatry Team - LCSW advised Dr. BREAUX that pt had concerns her sleep med is not working - relayed response from Dr. BREAUX that this will be addressed in tomorrow's med check  Visit Diagnosis:      ICD-10-CM ICD-9-CM   1. Major depressive disorder, recurrent severe without psychotic features (CMS/HCC)  F33.2 296.33   2. NORMA (generalized anxiety disorder)  F41.1 300.02   3. Alcohol use disorder, mild, in early remission  F10.11 305.03   4. Bulimia nervosa  F50.2 307.51         Assessment/Observations:  Chel was verbose and pressured in her speech in sharing today. She shared feeling blah and overwhelmed by daily stressors. She discussed how she is actually making friends and feeling some pressure to keep conversations going which made her stay up late, but that the benefit of feeling connected outweighed the cost. She was tangential in sharing her thoughts about her house and how she feels like she needs more hours in the day to be able to do basic tasks and clean, that she hasn't fixed certain things in the house and she sometimes wonders if she should just get an apartment to have a landlord take care of things, or  that she may have impulsive thoughts about wanting to move to be closer to a friend. LCSW encouraged pt to focus on what may be within her control today while she is in programming.   Plan:  Continue with PHP   Pt to F/U with treatment goals as outlined in treatment plan.  Pt to F/U with local ED/call 911 should SI/HI arises.    Vilma Gray, IRINAW

## 2022-06-02 NOTE — GROUP NOTE
Date:  6/2/2022  Start Time:  10:40 AM  End Time:  11:40 AM  Chel Price, YOB: 1975  7 members attended group today.    Group Focus:  Goal of group was to establish and build social support, review coping skills, normalize the struggles of being human, and increase self awareness and self compassion.    About the Group:  Clinician started group with a prompting quote/song to facilitate group discussion.  Group engaged in active and supportive discussion. Topics discussed included endings, loss, change, grief, setting limits, finding balance.  Group members were able to offer insightful and supportive feedback and suggestions about how to manage difficult situations.  Group ended with a meditation/song.  Marlene Burris MD was onsite when services were rendered.        Patient  was quiet but attentive.  Assessment/observation of group participation/presentation: Chel let LCSW know that she has 20% hearing loss and was willing to disclose this to the group and request they speak up. We came up with a signal where Chel can point up if she needs someone to speak up and expressed the group can also point down if she is speaking too loudly. Chel was less verbose in this group but did offer support to other group members and shared that she related to what they were saying. She shared briefly about some of the emotions she experiences when dealing with endings.  Treatment plan areas addressed: Depression, Anxiety and Grief and Loss  Visit Diagnosis:      ICD-10-CM ICD-9-CM   1. Major depressive disorder, recurrent severe without psychotic features (CMS/HCC)  F33.2 296.33   2. NORMA (generalized anxiety disorder)  F41.1 300.02   3. Alcohol use disorder, mild, in early remission  F10.11 305.03   4. Bulimia nervosa  F50.2 307.51       Plan: Continue with PHP   Pt to F/U with treatment goals as outlined in treatment plan.  Pt to F/U with local ED/call 911 should SI/HI arises.    Vilma Gray,  LCSW

## 2022-06-02 NOTE — GROUP NOTE
Date: 6/2/2022  Start Time:  1:20 PM  End Time:   2:20 PM  Chel Price, YOB: 1975,  was an active group participant.    Wrap Up Group   7 attended group today.   Group Focus: Goal of group was to wrap up and process the treatment day.  Assist  members in planning for the evening ahead and to assess and plan surrounding any  safety needs.      About the Group:  Clinician reviewed group members Afternoon  Reflection Sheets and did a verbal check in with each group member regarding safety  (SI/HI/self harm) and any necessary safety planning .  Session ended with a brief  meditation/calming activity.  Marlene Burris MD was onsite when  services rendered.        Afternoon Self Reflection  Depression: 2/5  Anxiety: 3/5  Anger: 0/5  Suicidal Ideation:   0/5 - None  Self Injury: 0/5 - None  Homicidal Ideation:   0/5 - None  Are you able to follow your Safety Plan? Yes.  I can/will:  Call someone, Use a coping skill and Use grounding with my 5 senses  Pt reported significant moment/insight of the day: revisiting CBT and how helpful it was in the past to challenge negative thoughts and beliefs  Pt reported gratitude list: Cat still alive, PHP program and slowing down, Son's smile  Pt identified goal progress for the day: Feeling less anxious  Pt reported evening self care plan: play with son, go to pool, spend time with cat   Pt treatment plan areas addressed: Depression, Anxiety, Self Care, Parenting Stress, Coping Skills, Mindfulness, CBT skills, Building strengths/resilience and Self-compassion    Visit Diagnosis:      ICD-10-CM ICD-9-CM   1. Major depressive disorder, recurrent severe without psychotic features (CMS/HCC)  F33.2 296.33   2. NORMA (generalized anxiety disorder)  F41.1 300.02   3. Alcohol use disorder, mild, in early remission  F10.11 305.03   4. Bulimia nervosa  F50.2 307.51       Assessment/Observations:  Pt shared that she appreciates revisiting CBT and how helpful it is to challenge  negative thinking. Pt shared that she used these tools in the past and they were helpful. Pt shared that she struggles with motivation to use the tools. Pt shared that she is grateful for the chance to slow down and just be in the moment. Pt shared that she is always doing something and trying to stay on schedule. Pt shared that she needs to be forced to slow down.   Plan: Continue with PHP   Pt to F/U with treatment goals as outlined in treatment plan.  Pt to F/U with local ED/call 911 should SI/HI arises.    Cheryl Mcbride, LPC

## 2022-06-03 ENCOUNTER — PHP (OUTPATIENT)
Dept: PSYCHIATRY | Facility: HOSPITAL | Age: 47
End: 2022-06-03
Attending: PSYCHIATRY & NEUROLOGY
Payer: COMMERCIAL

## 2022-06-03 DIAGNOSIS — F41.1 GAD (GENERALIZED ANXIETY DISORDER): ICD-10-CM

## 2022-06-03 DIAGNOSIS — F33.2 MAJOR DEPRESSIVE DISORDER, RECURRENT SEVERE WITHOUT PSYCHOTIC FEATURES (CMS/HCC): Primary | ICD-10-CM

## 2022-06-03 DIAGNOSIS — F50.20 BULIMIA NERVOSA: ICD-10-CM

## 2022-06-03 DIAGNOSIS — F10.11 ALCOHOL USE DISORDER, MILD, IN EARLY REMISSION: ICD-10-CM

## 2022-06-03 PROCEDURE — 99214 OFFICE O/P EST MOD 30 MIN: CPT | Performed by: PSYCHIATRY & NEUROLOGY

## 2022-06-03 PROCEDURE — H0035 MH PARTIAL HOSP TX UNDER 24H: HCPCS | Performed by: COUNSELOR

## 2022-06-03 RX ORDER — CETIRIZINE HYDROCHLORIDE 10 MG/1
10 TABLET ORAL DAILY
COMMUNITY

## 2022-06-03 RX ORDER — ATOGEPANT 10 MG/1
10 TABLET ORAL DAILY
COMMUNITY
End: 2022-06-10

## 2022-06-03 NOTE — GROUP NOTE
"Date:  6/3/2022  Start Time:  10:40 AM  End Time:  11:40 AM  Chel Price, YOB: 1975  9 members attended group today.    Group Focus:  Goal of group was to establish and build social support, review coping skills, normalize the struggles of being human, and increase self awareness and self compassion.    About the Group:  Clinician started group with a prompting quote/song to facilitate group discussion.  Group engaged in active and supportive discussion. Topics discussed included sibling relationships, boundaries, acceptance, identifying needs.  Group members were able to offer insightful and supportive feedback and suggestions about how to manage difficult situations.  Group ended with a meditation/song.  Marlene Burris MD was onsite when services were rendered.        Patient  was an active group participant.  Assessment/observation of group participation/presentation: Chel shared about how she may love but not like certain family members. She discussed how she frames it for herself as \"they are doing their job\" when people in her life are acting in a predictable though challenging way, such as if her mom offers unsolicited advice.   Treatment plan areas addressed: Depression, Anxiety, Relationship issues/Communication skills, Coping Skills, CBT skills and Building strengths/resilience  Visit Diagnosis:      ICD-10-CM ICD-9-CM   1. Major depressive disorder, recurrent severe without psychotic features (CMS/HCC)  F33.2 296.33   2. NORMA (generalized anxiety disorder)  F41.1 300.02   3. Alcohol use disorder, mild, in early remission  F10.11 305.03   4. Bulimia nervosa  F50.2 307.51       Plan: Continue with PHP   Pt to F/U with treatment goals as outlined in treatment plan.  Pt to F/U with local ED/call 911 should SI/HI arises.    Vilma Gray LCSW      "

## 2022-06-03 NOTE — PROGRESS NOTES
"[facility] PSYCHIATRY FOLLOW UP/MEDICATION CHECK    Chel Price is a 46 y.o. female who presents for Anxiety and Depression.    HPI     Things going well in PHP. Mood has been \"pretty good.\" Feeling calmer about cat's end of life, better able to motivate self around tasks related to that, I.e. talking to hospice.     Feeling less depressed and anxious this week. Feeling more motivated going into the weekend, will be spending time with brother this weekend.    Still feeling scattered from a focus perspective, can be difficult to articulate her thoughts.     Reports improvement in self-care like showering this week.     Has not been taking Adderall in the PM during PHP.      Denies any recent or current thoughts of wanting to be dead, no recent or current thoughts of suicide, no plan, no intent. Future-oriented toward son, work. No HI.     Stopped lorazepam as instructed. Still taking Vyvanse in AM, escitalopram as prescribed.     Psychiatric ROS:   Sleep: Has done hydroxyzine 50 mg the past couple nights. Taking it around 8:30 PM, trying to go to bed by 10. Usually tries to complete tasks in that 1.5 hours before going to bed, spending time on her phone, not using relaxation skills.   Appetite: Intact, no urges to purge  Substances: No urges/cravings to drink this week. Reports she has increased to 1/2 tablet of Antabuse this week. No drug use.     Medical Review of Systems  Review of Systems   Const: As per HPI  Neuro: Mild headache earlier this week, has resolved    PMSH: Changes made as needed    Risk/Benefit/side Effects discussed regarding the following medications:  As below  PDMP Queried: Yes    Allergies:   Allergies   Allergen Reactions   • Penicillins      Childhood allergy Reaction unknown   • Sulfa (Sulfonamide Antibiotics)      Flu like symptoms       Current Outpatient Medications:   •  atogepant (QULIPTA) 10 mg tablet, Take 10 mg by mouth daily. Unsure what does, , Disp: , Rfl:   •  cetirizine (ZyrTEC) 10 mg " "tablet, Take 10 mg by mouth daily., , Disp: , Rfl:   •  B2-magnesium cit,oxid-feverfew (MIGRELIEF) 200-180-50 mg tablet, Take by mouth., , Disp: , Rfl:   •  butalbital-acetaminophen-caff -40 mg per capsule, as needed., , Disp: , Rfl:   •  cyclobenzaprine (FLEXERIL) 5 mg tablet, Take 5 mg by mouth 3 (three) times a day as needed for muscle spasms., , Disp: , Rfl:   •  dextroamphetamine-amphetamine (ADDERALL) 5 mg tablet, every evening. Takes at 12 PM, , Disp: , Rfl:   •  disulfiram (ANTABUSE) 250 mg tablet, Take 250 mg by mouth daily. Takes only 1/4 of a tab, , Disp: , Rfl:   •  escitalopram (LEXAPRO) 20 mg tablet, Take 20 mg by mouth daily., , Disp: , Rfl:   •  hydrOXYzine (VistariL) 25 mg capsule, Take 1 cap (25 mg) daily at bedtime for insomnia, ok to take additional 1 cap (25 mg) if no effect within 30-60 minutes. Also to take 1 capsule as needed during the day for anxiety., , Disp: 30 capsule, Rfl: 0  •  metroNIDAZOLE (METROGEL) 0.75 % topical gel, Apply topically 2 (two) times a day., , Disp: , Rfl:   •  montelukast (SINGULAIR) 10 mg tablet, daily., , Disp: , Rfl:   •  multivitamin capsule, Take 1 capsule by mouth daily., , Disp: , Rfl:   •  ubrogepant (UBRELVY) 100 mg tablet tablet, Take 100 mg by mouth as needed for migraine. Second dose may be taken at least 2 hours after initial dose, , Disp: , Rfl:   •  VYVANSE 10 mg capsule, Take by mouth once daily., , Disp: , Rfl:        Objective     Mental Status Exam  Appearance: well groomed, good hygiene  Gait and Motor: no abnormal movements, no tremor, no PMR/PMA  Speech: normal rate/rhythm/volume  Mood: \"pretty good\"  Affect: anxious, full range, less labile than last week  Associations: intact  Thought Process: circumstantial, occasional tangents  Thought Content: no auditory or visual hallucinations, no delusionary content  Suicidality/Homicidality: denies SI, denies HI   Judgement/Insight: fair/fair, more limited re: alcohol/substance use  Orientation: " Intact to interview  Memory: Intact to interview  Attention: alert  Knowledge: normal  Language: normal         PHQ-9: Total Score-OWL Transcribed: 19  Thoughts that You Would be Better Off Dead or of Hurting Yourself in Some Way: 0-->not at all  NORMA-7 Total Score-OWL Transcribed: 15    [place] Suicide Severity Rating Scale:  Not done today      Safe-T Assessment:  Not done today        Labs  No new labs.    Imaging  Not applicable    ECG   Not applicable.          Brief Psychiatric Formulation: 46 year old woman with past med hx of migraines, past psych hx of MDD, NORMA, ADHD, BN, no past inpt hosp or suicide attempts, who presents for management of mood/anxiety in context of ongoing life stressors, including caring as single mother for special needs son.     Pt with long-standing hx of depression and anxiety. Curious if dx of ADHD truly accurate or may be more related to inattention as complication of untreated mood/anxiety disorders, though pt does cite hx of hyperactivity since childhood. Hx of BN that has been in remission for decades. Mood/anxiety most recently worsening in context of stressors with son to point patient having difficulty motivating self to shower, performing at work, and starting to think about death and her . Given recent SSRI change, will focus first on non-pharmacologic skills and utilize hydroxyzine PRN over lorazepam given hx of alcohol use disorder. Counseled pt extensively on effects of alcohol on mood/anxiety, need to abstain going forward in PHP, pt willing to do so. Suspect alcohol use likely consequence of undertreated mood/anxiety disorders.    Now long term through PHP, pt reports improvement in mood, anxiety, and daily functioning. Still struggling with insomnia though on further interview, not adhering to sleep hygiene techniques, encouraged her to do so and change timing of hydroxyzine to closer to desired bedtime. Will encourage her to continue to hold PM Adderall as we  focus on sleep this week. Denies any alcohol use during PHP, no cravings to ues.     Pt is at chronic elevated risk of intentional harm to self given hx of depression/anxiety, hx of eating disorder, hx of trauma, hx of substance use. Her acute risk is elevated by ongoing but improved depression/anxiety sx, family/work stressors, though this acute risk is mitigated by lack of recent or current suicidal ideation, lack of plan/intent, lack of prior suicide attempts, commitment to family/work, willingness to abstain from alcohol, treatment-seeking behavior, and future orientation. In sum, her acute risk of intentional harm to self is not significantly elevated from usual baseline as to warrant hospitalization, but given her degree of symptoms and impact on functioning, she is appropriate for PHP. She denies any thoughts of harming others and has no history of violence, so risk to others is low.         Based on [place] Suicide Screen and current clinical assessment, patient is determined Low Risk.  Suicide Risk/Suicidal Ideation will not be added to patient's treatment plan.     Plan:   Recertification: I certify that PHP level of care continues to be required, improvement is expected and without this medically necessary treatment, inpatient psychiatric care would be required.  Chel Price's response to therapeutic intervention has shown gradual improvement. Chel Price remains at risk for psychiatric hospitalization due to moderate to severe mood symptoms and severe anxiety. Treatment goals and coordination of care with multidisciplinary team as outlined in updated treatment plan     - Continue escitalopram 20 mg daily for now, reviewed close similarities to citalopram, but given pt's sister's success, she would like to continue     - Continue Vyvanse 10 mg daily, holding PM Adderall at this time. Reviewed risks of stimulants, need to seek out PCP for refills during PHP program.        - Take hydroxyzine 50 mg daily  at bedtime no more than 30 min before desired bedtime. Reviewed risks/benefits/side effects in my usual fashion, including no treatment. Reviewed need to adhere to sleep hygiene techniques, avoid phone etc. Before bed     - Continue Antabuse, encouraged pt to consider taking full dose prescribed by PCP (rather than 1/2 tablet she is currently taking)     - Counseled patient extensively on effects of excessive  alcohol use on physical and emotional health. Instructed pt to abstain going forward, she agrees to do so and understands that return to drinking during PHP will likely result in SUDS referral     - Outpatient psych with Abbie HOANG - encouraged pt to consider consolidating all psychiatric medications under this single provider, left VM for NATALYA Tejada today     - Next PHP psych visit: 1 week     - Patient aware of how to contact office prior to next appointment with any issues, after-hours resources. Patient instructed to call 911 or go to nearest ED if thoughts of self-harm, suicide, or violence towards others.     Visit Diagnosis:    ICD-10-CM ICD-9-CM   1. Major depressive disorder, recurrent severe without psychotic features (CMS/HCC)  F33.2 296.33   2. NORMA (generalized anxiety disorder)  F41.1 300.02   3. Alcohol use disorder, mild, in early remission  F10.11 305.03   4. Bulimia nervosa  F50.2 307.51       Duration:  25 minutes  Marlene Burris MD @ 10:27 AM

## 2022-06-03 NOTE — GROUP NOTE
Date: 6/3/2022  Start Time:   9:30 AM  End Time: 10:30 AM  Chel Price, YOB: 1975,  was an active group participant.    Community Check In Group  9 attended group today.     Group Focus: Goal of group was to welcome new and returning PHP members to the [facility], check in regarding group member needs, and assess safety.    About the Group: Clinician reviewed [facility] Group Code of Conduct and answered any questions that arose.  Clinician welcomed new members to the group and facilitated brief introductions of group members to one another.  Introduced topic/theme for the treatment day:Resiliency Building.  Encouraged group members to request support throughout the day as needed.  Clinician reviewed group members’ Morning Reflection Sheets and did a verbal check in with each group member regarding safety (SI/HI/self harm), safety planning, medication compliance, if they needed to consult with anyone today and individual treatment needs/goals for the day.  Session ended with a brief meditation/calming activity.   Marlene Burris MD was on site when services rendered.    Morning Self reflection:   Depression: 1/5  Anxiety: 3/5  Anger: 0/5  Suicidal Ideation:   0/5 - None  Self Injury: 0/5 - None  Engaged in Self Injury since yesterday: No  Homicidal Ideation:   0/5 - None  Are you able to follow your Safety Plan? Yes.  I can/will:  Call someone, Journal, Use a coping skill, Listen to music, Use grounding with my 5 senses and Call 911 if I'm unsafe.  Substance Use:  No  Self Care:  I completed my self care activity last night:  quality time wiht my son, gave career advice to someone  I am satisfied with my daily hygiene:  yes  Nourish:Number of meals eaten yesterday? 3 and Describe:  Normal    Sleep:  Uses sleep aid? yes    and Describe:  Number of hours:  5-6 and stayed up late  Social Interaction:Minimal:  Family and Friends  Physical Activity: No  Mindful Activity: Organized kitchen table  Medication:  Prescribed  Thought Content: Clear, cloudy  Pt reported use of coping skills including successful follow through and barriers to follow through: Spoke with [facility]. More info = calmer mind  Pt reported significant or positive event/step: Listened to music lifted mood, reached out to my brother about getting together this weekend  Pt identified goal for the treatment day: Grief for relationships that ended a long time ago, I have the urge to reconnect but my gut tells me that's not helpful to anyone  Pt treatment plan areas addressed:  Depression, Anxiety, Low Self Esteem, Relationship issues/Communication skills, Grief and Loss, Social Isolation, Self Care, Parenting Stress and Sleep Disturbance  Pt requested consult with: None  Visit Diagnosis:      ICD-10-CM ICD-9-CM   1. Major depressive disorder, recurrent severe without psychotic features (CMS/HCC)  F33.2 296.33   2. NORMA (generalized anxiety disorder)  F41.1 300.02   3. Alcohol use disorder, mild, in early remission  F10.11 305.03   4. Bulimia nervosa  F50.2 307.51         Assessment/Observations:  Chel continues to have pressured speech and is somewhat tangential in her presentation. She discussed how she was able to let go self-judgment about having her son be on a game while she was tidying up. She identified going to bed late because of being on her phone, which she previously has identified feeling overly attached to the phone. She seems to be connecting well with other group members and in the amount she is communicating with others outside of tx.  Plan:  Continue with PHP   Pt to F/U with treatment goals as outlined in treatment plan.  Pt to F/U with local ED/call 911 should SI/HI arises.    Vilma Gray, ZAFAR

## 2022-06-03 NOTE — GROUP NOTE
Date: 6/3/2022  Start Time:  1:20 PM  End Time:   2:20 PM  Chel Price, YOB: 1975,  was an active group participant.    Wrap Up Group  9 attended group today.   Group Focus: Goal of group was to wrap up and process the treatment day.  Assist  members in planning for the evening ahead and to assess and plan surrounding any  safety needs.      About the Group:  Clinician reviewed group members Afternoon  Reflection Sheets and did a verbal check in with each group member regarding safety  (SI/HI/self harm) and any necessary safety planning .  Session ended with a brief  meditation/calming activity.  Marlene Burris MD was onsite when  services rendered.        Afternoon Self Reflection  Depression: 1/5  Anxiety: 2/5  Anger: 0/5  Suicidal Ideation:   0/5 - None  Self Injury: 0/5 - None  Homicidal Ideation:   0/5 - None  Are you able to follow your Safety Plan? Yes.  I can/will:  Call someone, Journal, Use a coping skill, Listen to music, Use grounding with my 5 senses and Call 911 if I'm unsafe.  Pt reported significant moment/insight of the day: learning about resilience  Pt reported gratitude list: nice morning with son, brothers invitation, flowers  Pt identified goal progress for the day: helpful relationship convo was helpful  Pt reported evening self care plan:exercise, socialize  Pt treatment plan areas addressed: Depression, Anxiety, Relationship issues/Communication skills, Parenting Stress, Coping Skills and Building strengths/resilience    Visit Diagnosis:      ICD-10-CM ICD-9-CM   1. Major depressive disorder, recurrent severe without psychotic features (CMS/HCC)  F33.2 296.33   2. NORMA (generalized anxiety disorder)  F41.1 300.02   3. Alcohol use disorder, mild, in early remission  F10.11 305.03   4. Bulimia nervosa  F50.2 307.51       Assessment/Observations:  Chel shared joyfully about planting flowers tonight and sharing with her neigh this morning and their joyful response.  She was  hopeful for the weekend and plans to visit her brother tonight who she has felt distanced from.   Plan: Continue with PHP   Pt to F/U with treatment goals as outlined in treatment plan.  Pt to F/U with local ED/call 911 should SI/HI arises.    Paula Vee, LPC

## 2022-06-03 NOTE — GROUP NOTE
Date:  6/3/2022  Start Time:  12:10 PM  End Time:   1:10 PM  Chel Price, YOB: 1975  Psychoeducational/Skills Based Group  9 members attended group today    Group Focus: Goal of group was to introduce skills and psychoeducation related to topic of PHP day.   Group members were provided instruction and opportunities to practice presented skills.  Clinician answered questions as they arose and shared how presented skills can be utilized on a daily basis to increase emotional wellness.      About the Group: Resiliency Building Session 5: Resiliency Building Psycho-Ed/Skills Based Group Summary  Topic of curriculum was “Resiliency Building”. Clinician began group by discussing hos protector factors can help foster resiliency, using a worksheet from [employer]. Group members were encouraged to identify protective factors that they would like to develop/strengthen in order to help them overcome obstacles. Clinician led the group through a time of reflection on prioritizing values and time management, with the goal of help group members identify their values to build up their resilience. Group members were asked the following questions: 1) in what areas of your life do you want to invest your time/energy/resources? 2) What areas of your life do you want to prioritize to assist in healthy decision-making? 3) How can you cultivate more time for play/creativity/rest? 4) How do or can we manage the barriers that interfere with prioritizing our values? 5) What happens when our actions are not aligned with our values/priorities? How does that make use feel? How can we start living more aligned with our values? Clinician then encouraged group members to set goals related to developing/strengthening their values, encouraging them to set small, achievable goals to build optimism and confidence. Clinician closed group by asking each group member to choose 3 strengths from the Strengths Deck, and to assist one  another if struggling to identify strengths. Group members were encouraged to write down or note their strengths for use at check-out group.     Marlene Burris MD was onsite when services were rendered.          Patient  was an active group participant.  Assessment/observation of group participation/presentation: Pt was engaged in group discussion about resiliency. Pt shared about sense of purpose being important to her, and how over the past 10 years she feels that she has not contributed to society or community. Pt was able to relate with peers when talking about social media and the negative impacts of social media of self-esteem. Pt also highlighted some positive aspects of social media with support groups and information. Pt was supportive of peers throughout group session as well.   Treatment plan areas addressed: Depression, Anxiety, Employment/vocational stress, Self Care, Mood dysregulation, Parenting Stress, Sleep Disturbance, Coping Skills, Mindfulness, CBT skills, Building strengths/resilience and Self-compassion  Visit Diagnosis:      ICD-10-CM ICD-9-CM   1. Major depressive disorder, recurrent severe without psychotic features (CMS/HCC)  F33.2 296.33   2. NORMA (generalized anxiety disorder)  F41.1 300.02   3. Alcohol use disorder, mild, in early remission  F10.11 305.03   4. Bulimia nervosa  F50.2 307.51       Plan: Continue with PHP   Pt to F/U with treatment goals as outlined in treatment plan.  Pt to F/U with local ED/call 911 should SI/HI arises.    Cheryl Mcbride, [facility]

## 2022-06-06 ENCOUNTER — NURSE ONLY (OUTPATIENT)
Dept: PSYCHIATRY | Facility: HOSPITAL | Age: 47
End: 2022-06-06

## 2022-06-06 ENCOUNTER — PHP (OUTPATIENT)
Dept: PSYCHIATRY | Facility: HOSPITAL | Age: 47
End: 2022-06-06
Attending: PSYCHIATRY & NEUROLOGY
Payer: COMMERCIAL

## 2022-06-06 ENCOUNTER — PHP (OUTPATIENT)
Dept: PSYCHIATRY | Facility: HOSPITAL | Age: 47
End: 2022-06-06
Attending: COUNSELOR
Payer: COMMERCIAL

## 2022-06-06 DIAGNOSIS — F41.1 GAD (GENERALIZED ANXIETY DISORDER): ICD-10-CM

## 2022-06-06 DIAGNOSIS — F10.11 ALCOHOL USE DISORDER, MILD, IN EARLY REMISSION: ICD-10-CM

## 2022-06-06 DIAGNOSIS — F33.2 MAJOR DEPRESSIVE DISORDER, RECURRENT SEVERE WITHOUT PSYCHOTIC FEATURES (CMS/HCC): Primary | ICD-10-CM

## 2022-06-06 DIAGNOSIS — F50.20 BULIMIA NERVOSA: ICD-10-CM

## 2022-06-06 PROCEDURE — 90837 PSYTX W PT 60 MINUTES: CPT | Performed by: COUNSELOR

## 2022-06-06 PROCEDURE — H0035 MH PARTIAL HOSP TX UNDER 24H: HCPCS

## 2022-06-06 ASSESSMENT — COGNITIVE AND FUNCTIONAL STATUS - GENERAL
APPEARANCE: WELL GROOMED
PSYCHOMOTOR FUNCTIONING: RESTLESS
MOOD: ANXIOUS
DELUSIONS: NONE OR AGE APPROPRIATE
RECENT MEMORY: WNL
EST. PREMORBID INTELLIGENCE: NO CHANGE
SLEEP_WAKE_CYCLE: DECREASED
AFFECT: FULL RANGE
REMOTE MEMORY: WNL
THOUGHT_CONTENT: APPROPRIATE
LIBIDO: NON-CONTRIBUTORY
THOUGHT_PROCESS: TANGENTIAL
AROUSAL LEVEL: ALERT
APPETITE: NO CHANGE
ORIENTATION: FULLY ORIENTED
ATTENTION: WNL
PERCEPTUAL FUNCTION: NORMAL
SPEECH: REGULAR;PRESSURED
CONCENTRATION: WNL
EYE_CONTACT: WNL
IMPULSE CONTROL: INTACT
INSIGHT: INTACT

## 2022-06-06 NOTE — PROGRESS NOTES
"Chel came to office to discuss difficulty with sleep.  She had discussed this concern earlier in the day with therapist and voiced understanding of needing improved sleep hygiene.  Reinforced with Chel creating night time routine.  Discussed patience and adherence being key to \"retraining\" circadian cycle.  Chel acknowledges understanding and voices no other questions, concerns or needs at this time.   "

## 2022-06-06 NOTE — GROUP NOTE
Date: 6/6/2022  Start Time:  1:20 PM  End Time:   2:20 PM  Chel Price, YOB: 1975,  was an active group participant.    Wrap Up Group  7 attended group today.   Group Focus: Goal of group was to wrap up and process the treatment day.  Assist  members in planning for the evening ahead and to assess and plan surrounding any  safety needs.      About the Group:  Clinician reviewed group members Afternoon  Reflection Sheets and did a verbal check in with each group member regarding safety  (SI/HI/self harm) and any necessary safety planning .  Session ended with a brief  meditation/calming activity.  Tiara Melvin MD was onsite when  services rendered.        Afternoon Self Reflection  Depression: 2/5  Anxiety: 3/5  Anger: 0/5  Suicidal Ideation:   0/5 - None  Self Injury: 0/5 - None  Homicidal Ideation:   0/5 - None  Are you able to follow your Safety Plan? Yes.  I can/will:  Call someone, Journal, Use a coping skill, Use grounding with my 5 senses and Call 911 if I'm unsafe.  Pt reported significant moment/insight of the day: Taking care of myself can mean NOT doing things   Pt reported gratitude list: 1) my son's energy and enthusiasm this morning 2) the beautiful weather 3) that my employer is understanding about this time I am taking off from work  Pt identified goal progress for the day: I stayed awake. I am going to sleep well tonight. I'm so tired I've had a hard time processing anything today.  Pt reported evening self care plan:Short nap after program, swim with son, go to bed early  Pt treatment plan areas addressed: Depression, Anxiety, Self Care and Sleep Disturbance    Visit Diagnosis:      ICD-10-CM ICD-9-CM   1. Major depressive disorder, recurrent severe without psychotic features (CMS/HCC)  F33.2 296.33   2. NORMA (generalized anxiety disorder)  F41.1 300.02   3. Alcohol use disorder, mild, in early remission  F10.11 305.03       Assessment/Observations:  Despite complaining of tiredness,  Chel was able to engage in group appropriately. She related to others verbally. She was open to discussing ways in which she can continue to promote rest as part of her self-care and sleep hygiene routine.  Plan: Continue with PHP   Pt to F/U with treatment goals as outlined in treatment plan.  Pt to F/U with local ED/call 911 should SI/HI arises.    Vilma Gray LCSW

## 2022-06-06 NOTE — GROUP NOTE
Date: 6/6/2022  Start Time:   9:30 AM  End Time: 10:30 AM  Chel Price, YOB: 1975,  was an active group participant.    Community Check In Group  8 attended group today.     Group Focus: Goal of group was to welcome new and returning PHP members to the [facility], check in regarding group member needs, and assess safety.    About the Group: Clinician reviewed [facility] Group Code of Conduct and answered any questions that arose.  Clinician welcomed new members to the group and facilitated brief introductions of group members to one another.  Introduced topic/theme for the treatment day:Self Care.  Encouraged group members to request support throughout the day as needed.  Clinician reviewed group members’ Morning Reflection Sheets and did a verbal check in with each group member regarding safety (SI/HI/self harm), safety planning, medication compliance, if they needed to consult with anyone today and individual treatment needs/goals for the day.  Session ended with a brief meditation/calming activity.   Tiara Melvin MD was on site when services rendered.    Morning Self reflection:   Depression: 2/5  Anxiety: 4/5  Anger: 0/5  Suicidal Ideation:   0/5 - None  Self Injury: 0/5 - None  Engaged in Self Injury since yesterday: No  Homicidal Ideation:   0/5 - None  Are you able to follow your Safety Plan? Yes.  I can/will:  Call someone, Journal, Use a coping skill, Use grounding with my 5 senses and Call 911 if I'm unsafe.  Substance Use:  No  Self Care:  I completed my self care activity last night:  long bath  I am satisfied with my daily hygiene:  yes  Nourish:Number of meals eaten yesterday? 3 and Describe:  Normal    Sleep:  Uses sleep aid? yes    and Describe:  Number of hours:  4 and coundn't fall asleep- tryign to use sleep hygeine suggestions  Social Interaction:Minimal:  Family and Friends  Physical Activity: a lot fo steps but no tred mill  Mindful Activity: breath work, park and pond walk  Medication:  Prescribed  Thought Content: clear and cloudy- very tired  Pt reported use of coping skills including successful follow through and barriers to follow through: took time from  to go to parents and nap  Pt reported significant or positive event/step: went to a lake, cleaned and decluttered  Pt identified goal for the treatment day: listen and pay close attention to tohers  Pt treatment plan areas addressed:  Depression, Anxiety, Self Care, Parenting Stress and Coping Skills  Pt requested consult with: Nurse  Visit Diagnosis:      ICD-10-CM ICD-9-CM   1. Major depressive disorder, recurrent severe without psychotic features (CMS/HCC)  F33.2 296.33   2. NORMA (generalized anxiety disorder)  F41.1 300.02   3. Alcohol use disorder, mild, in early remission  F10.11 305.03         Assessment/Observations:  Chel shared about an eventful weekend and feeling good about the time with her son.  She shared about concerns of self image and also her fatigued and lack of sleep.  She was encouraged to discuss persistent sleep concerns with nurse.   Plan:  Continue with PHP   Pt to F/U with treatment goals as outlined in treatment plan.  Pt to F/U with local ED/call 911 should SI/HI arises.    Paula Vee, LPC

## 2022-06-06 NOTE — PROGRESS NOTES
"[facility] Psychotherapy Note    Chel Price is a 46 y.o. female who presents for Follow-up.     Treatment Plan areas addressed during this visit: LPC met with pt for 1:1 session in PHP tx and tp review tx plan update.     Mental Status Exam:  Arousal Level: Alert  Appearance: Well Groomed  Speech: Regular, Pressured (rapid)  Psychomotor Functioning: Restless  Eye Contact: WNL  Est. Premorbid Intelligence: No change  Orientation: Fully oriented  Attention: WNL  Concentration: WNL  Recent Memory: WNL  Remote Memory: WNL  Thought Content: Appropriate (Guarded around nature of trauma)  Thought Process: Tangential  Insight: Intact  Perceptual Function: Normal  Delusions: None or age appropriate  Sleeping: Decreased  Appetite: No Change  Libido: Non-Contributory  Affect: Full Range  Mood: Anxious     PHQ-9: Total Score-OWL Transcribed: 11  Thoughts that You Would be Better Off Dead or of Hurting Yourself in Some Way: 0-->not at all  NORMA-7 Total Score-OWL Transcribed: 13    [place] Suicide Severity Rating Scale:  Not indicated      Safe-T Assessment:  Not indicated        Assessment:   LPC inquired about how pt's weekend went. Pt shared that she is exhausted and has not been sleeping well. Pt shared that she thought about calling out of [facility] tx today and thought about what excuse she could use to cancel. Pt shared that she pushed herself to come to tx. LPC affirmed pt for pushing herself to show up despite feeling tired. Pt shared about what tends to happen for her after her son goes to sleep around 9-9:30pm. Pt shared that she will take the PRN sleep med prior to her son going to sleep and then she will feel tired but will choose do things in the house because she finally has \"free time\". Pt shared that she will do some tasks, then play a game on her phone or check social media. Pt shared that she will then get hungry around 11pm and she will either eat or try to stay in bed and do breathing techniques. Pt shared that her " "thoughts will then race and she has difficulty going to sleep. Pt shared that she was up until 2am this morning with racing thoughts and her son woke up at 6am. Pt shared that she has been averaging 4-5 hours per night the past few nights with sleep. Pt shared that she spoke with [facility] Psychiatrist on Friday and felt disappointed that she wasn't given \"a magical solution\" for sleep. Pt shared that she knows what she needs to be doing differently, but her reaction is irritability when someone tells her what she needs to be doing. LPC suggested that she talk about the barriers to doing what she needs to do. LPC spoke with pt about a bedtime routine that she can be consistent with. Pt agreed that she starts these patterns but does not stay consistent with them. LPC and pt talked about pt taking the PRN medication right after her son goes to bed so that she can journal about her thoughts, and do some type of body centered relaxation such as body scan or breathing exercise. LPC suggested that pt stay off of her phone and away from social media past a certain time in the evening (I.e. 7pm). Pt agreed that writing out this routine could be helpful for her. Pt shared that she tends to have racing thoughts about life plans and where she should move or what she should be doing for work. LPC spoke with pt about [place] of control and allowing herself to focus on realistic goals right now. Pt shared about having multiple ideas and not being able to remain consistent with any of them. LPC spoke with pt about sleep being more important when trying to make bigger goals and decisions. Pt agreed that rest and sleep are important. Pt tends to get lost in her thoughts about more global ideas and trying to run from whatever she is feeling or experiencing. Pt shared that she learned a lot from having an eating disorder earlier in her life. Pt shared that she tried to move different places and change her environment, but this did not stop " her from engaging in an eating disorder. Pt was able to recognize that running away now will not help her situation either. LPC spoke with pt about alcohol use and cravings/urges to drink. Pt shared that she continues to take a small amount of antabuse at night. Pt shared that she has not thought about drinking. Pt shared that she tends to be preoccupied and obsessed about controlling and justifying drinking which is the most problematic consequence for her. Pt shared that she has not had those thoughts recently. Pt shared that she needs to develop behaviors and strategies for when she feels triggered to drink. Pt highlighted two main triggers for cravings and thoughts to drink such as being around family on vacation who are drinking, and stress related to parenting with her son. LPC spoke with pt about increasing antabuse dosage to the prescribed amount when she is around others drinking on vacation as incentive to not drink. Pt shared that she will stop taking the antabuse when she knows that she will be around others whom she wants to drink with. LPC spoke with pt about ID coping skills while in PHP tx that she can use when feeling stressed or overwhelmed. LPC inquired about AA meetings. Pt shared that she does not agree with all aspects of AA and it was helpful for her in the past when she first needed help with alcohol. Pt shared that she has sought out other supports for alcohol use, and has other resources that she enjoys more ( a women's group in [facility]) and some groups on social media. LPC encouraged pt to utilize these supports to help her with abstinence. LPC spoke with pt about aftercare planning. LPC suggested that pt look into [facility] for dual dx IOP.  Pt shared that she is open to exploring this. Pt shared that she is also open to remaining with [facility] in person IOP if she can make it work with her son's schedule. LPC will talk more with pt about this during the week. Pt's mother has not  returned LPC's call. Pt shared that her parents have been out of town the past week. Pt shared that she plans to continue to see Dr. Garza for therapy but was also thinking of others resources. LPC will provide pt with a referral list for OP therapy. Pt has OP prescriber for medication management. LPC reviewed tx plan up with pt and reviewed goals for pt to continue to work on. Pt signed tx plan update and received a copy.   Psychological condition is generally: improving    Plan:    Patient to F/U with PHP.  Patient to F/U with treatment goals as outlined in treatment plan.  Patient to F/U with local ED or call 911 should SI/HI arise.  Visit Diagnosis:    ICD-10-CM ICD-9-CM   1. Major depressive disorder, recurrent severe without psychotic features (CMS/HCC)  F33.2 296.33   2. NORMA (generalized anxiety disorder)  F41.1 300.02   3. Alcohol use disorder, mild, in early remission  F10.11 305.03   4. Bulimia nervosa  F50.2 307.51       Time  Start Time: 0830  End Time: 0930  Total Time: 60  Cheryl Mcbride LPC @ 10:13 AM

## 2022-06-06 NOTE — GROUP NOTE
Date:  6/6/2022  Start Time:  10:40 AM  End Time:  11:40 AM  Emerson Price, YOB: 1975   8 members attended group today.    Group Focus:  Goal of group was to establish and build social support, review coping skills, normalize the struggles of being human, and increase self awareness and self compassion.    About the Group:  Clinician started group with a prompting quote/song to facilitate group discussion.  Group engaged in active and supportive discussion. Topics discussed included self care, effectiveness, mindfulness, next steps, aftercare, mental health levels of care, managing expectations and self advocacy.  Group members were able to offer insightful and supportive feedback and suggestions about how to manage difficult situations.  Group ended with a meditation/song.  Tiara Melvin MD was onsite when services were rendered.        Patient  was an active group participant.  Assessment/observation of group participation/presentation: emerson shared about her experience accessing care and the barriers that impeded showing up for self care even when motivated.  She was empathic toward peers and was attentive.   Treatment plan areas addressed: Depression, Anxiety, Relationship issues/Communication skills, Self Care, Parenting Stress and Coping Skills  Visit Diagnosis:      ICD-10-CM ICD-9-CM   1. Major depressive disorder, recurrent severe without psychotic features (CMS/HCC)  F33.2 296.33   2. NORMA (generalized anxiety disorder)  F41.1 300.02   3. Alcohol use disorder, mild, in early remission  F10.11 305.03       Plan: Continue with PHP   Pt to F/U with treatment goals as outlined in treatment plan.  Pt to F/U with local ED/call 911 should SI/HI arises.    Paula Vee, ANDREAS

## 2022-06-06 NOTE — GROUP NOTE
"Date:  6/6/2022  Start Time:  12:10 PM  End Time:   1:10 PM  Chel Price, YOB: 1975  Psychoeducational/Skills Based Group  8 members attended group today    Group Focus: Goal of group was to introduce skills and psychoeducation related to topic of PHP day.   Group members were provided instruction and opportunities to practice presented skills.  Clinician answered questions as they arose and shared how presented skills can be utilized on a daily basis to increase emotional wellness.      About the Group: Self Care Session 6: Self Care Psycho-Ed/Skills Based Group Summary  Topic of curriculum was “Self Care.”  Clinician introduced the idea of setting goals for self-care and the importance of taking time for self amidst people’s busy lives. Clinician facilitated discussion on  “[place] of Control” to help group members practice recognizing what they can and cannot control.  Clinician utilized worksheet on “[place] of Control” to help group members increase identification of what is within their control. Clinician led a group discussion encouraging group members to identify ideas for self care. Group members highlighted strategies for self care that they have used past, present and will use in the future. Clinician utilized “Behavior Activation Worksheet”  from [employer] to help members set goal for self care that is realistic and able to be managed in a realistic time frame. Clinician closed the group session with a mindful breathing meditation from [employer] for self care.     Tiara Melvin MD was onsite when services were rendered.          Patient  was an active group participant.  Assessment/observation of group participation/presentation: Pt was engaged in group session and offered multiple ideas for self-care. Pt shared about honesty being a form of self-care for her. Pt shared about how \"white lies\" can be a problem for her, and how remaining honest with others helps her to have a clear " "conscious. Pt shared that telling others how she is feeling is also self-care for her. Pt shared about \"saying no\" and \"saying yes\" are forms of self-care for her.   Treatment plan areas addressed: Depression, Anxiety, Employment/vocational stress, Self Care, Mood dysregulation, Parenting Stress, Trauma, Sleep Disturbance, Coping Skills, Mindfulness, CBT skills, Building strengths/resilience and Self-compassion  Visit Diagnosis:      ICD-10-CM ICD-9-CM   1. Major depressive disorder, recurrent severe without psychotic features (CMS/HCC)  F33.2 296.33   2. NORMA (generalized anxiety disorder)  F41.1 300.02   3. Alcohol use disorder, mild, in early remission  F10.11 305.03       Plan: Continue with PHP   Pt to F/U with treatment goals as outlined in treatment plan.  Pt to F/U with local ED/call 911 should SI/HI arises.    Cheryl Mcbride, LPC      "

## 2022-06-07 ENCOUNTER — DOCUMENTATION (OUTPATIENT)
Dept: PSYCHIATRY | Facility: HOSPITAL | Age: 47
End: 2022-06-07
Payer: COMMERCIAL

## 2022-06-07 ENCOUNTER — PHP (OUTPATIENT)
Dept: PSYCHIATRY | Facility: HOSPITAL | Age: 47
End: 2022-06-07
Attending: PSYCHIATRY & NEUROLOGY
Payer: COMMERCIAL

## 2022-06-07 DIAGNOSIS — F41.1 GAD (GENERALIZED ANXIETY DISORDER): ICD-10-CM

## 2022-06-07 DIAGNOSIS — F10.11 ALCOHOL USE DISORDER, MILD, IN EARLY REMISSION: ICD-10-CM

## 2022-06-07 DIAGNOSIS — F50.20 BULIMIA NERVOSA: ICD-10-CM

## 2022-06-07 DIAGNOSIS — F33.2 MAJOR DEPRESSIVE DISORDER, RECURRENT SEVERE WITHOUT PSYCHOTIC FEATURES (CMS/HCC): Primary | ICD-10-CM

## 2022-06-07 PROCEDURE — G0410 GRP PSYCH PARTIAL HOSP 45-50: HCPCS

## 2022-06-07 ASSESSMENT — COGNITIVE AND FUNCTIONAL STATUS - GENERAL
IMPULSE CONTROL: INTACT
ORIENTATION: FULLY ORIENTED
DELUSIONS: NONE OR AGE APPROPRIATE
SPEECH: REGULAR
THOUGHT_CONTENT: APPROPRIATE;GUARDED
INSIGHT: INTACT
EYE_CONTACT: WNL
MOOD: IRRITABLE;ANXIOUS
LIBIDO: NON-CONTRIBUTORY
REMOTE MEMORY: WNL
APPETITE: NO CHANGE
SLEEP_WAKE_CYCLE: INCREASED
AROUSAL LEVEL: AWAKE
RECENT MEMORY: WNL
PERCEPTUAL FUNCTION: NORMAL
ATTENTION: WNL
CONCENTRATION: WNL
THOUGHT_PROCESS: WNL
EST. PREMORBID INTELLIGENCE: NO CHANGE
AFFECT: FULL RANGE;TEARFUL
PSYCHOMOTOR FUNCTIONING: WNL
APPEARANCE: WELL GROOMED

## 2022-06-07 NOTE — PROGRESS NOTES
"Chel informed staff that she would be leaving for day to rest d/t not sleeping well over past few nights.  Chel stated \"I didn't just want to leave without telling anyone.  I'm not depressed I'm just super tired, I'm falling asleep in group.\"  Chel states over the past few nights she has been sleeping 4 hours per night and last night slept for 6 hours.  She reports that she usually has support from parents to help with her son so she can rest however they are away on vacation and isn't able to rest as needed when they are available.  Discussed safety of driving while reportedly feeling tired, Chel insisted she was able to drive.  She was alert through interaction, focused, communicating effectively, maintaining appropriate eye contact, did not appear lethargic, gait normal when ambulating to meet with therapist.  Discussed sleep routine , Chel couldn't identify a routine that she completes before bed.  She attributes the tiredness to vistaril and she also brought up melatonin (she is not currently taking).  Educated Chel that melatonin does not \"make you fall\" asleep and instead targets regulating sleep cycle.  Suggested she stay, she was willing to speak to clinician prior to leaving.  Information communicated to her treatment/therapy team.   "

## 2022-06-07 NOTE — PROGRESS NOTES
[facility] Psychotherapy Note    Chel Price is a 46 y.o. female who presents for Follow-up.     Treatment Plan areas addressed during this visit:  LPC met with pt briefly to discuss her desire to leave PHP tx for the day due to feeling tired.     Mental Status Exam:  Arousal Level: Awake  Appearance: Well Groomed  Speech: Regular (rapid)  Psychomotor Functioning: WNL  Eye Contact: WNL  Est. Premorbid Intelligence: No change  Orientation: Fully oriented  Attention: WNL  Concentration: WNL  Recent Memory: WNL  Remote Memory: WNL  Thought Content: Appropriate, Guarded (Guarded around nature of trauma)  Thought Process: WNL  Insight: Intact  Perceptual Function: Normal  Delusions: None or age appropriate  Sleeping: Increased  Appetite: No Change  Libido: Non-Contributory  Affect: Full Range, Tearful  Mood: Irritable, Anxious     PHQ-9: Total Score-OWL Transcribed: 11  Thoughts that You Would be Better Off Dead or of Hurting Yourself in Some Way: 0-->not at all  NORMA-7 Total Score-OWL Transcribed: 13    [place] Suicide Severity Rating Scale:  Not indicated      Safe-T Assessment:  Not indicated        Assessment:   Pt had told  staff during [facility] tx lunch break that she wanted to go home due to feeling overly tired. LPC was in a meeting at the time, and asked that [facility] nurse speak with pt. Pt informed nurse that she has not been sleeping well and wanted to go home to nap and rest. LPC spoke with pt following meeting, to assess pt's needs. Pt reported that the Hydroxyzine has not been working. Pt reported that she slept 6 hours last night as opposed to 4-5 hours the previous night, but continues to feel exhausted. LPC expressed concern that if pt naps this afternoon, then this could impact her sleep hygiene for the night. Pt reported that she may take something OTC to help her sleep given the sleep medication has not worked for her. LPC encouraged pt to allow LPC to collaborate with [facility] psychiatrist about medication not working.  Pt became tearful and apologized for needing to leave tx. Pt expressed feeling guilty that she is leaving. ANDREAS also inquired about pt's report in check in group that she is not able to work through her individual problems in PHP tx and has only been learning skills to accept her current reality. LPC validated pt's concerns and encouraged her to share about her individual needs during open process groups. Pt also reported that she understand OP therapy would be the best place to work through her individual problems. Pt reported that she wanted to go home and rest before her son comes home at 4pm because she has no time to herself, and cannot rest when he is home. LPC validated pt's experience as a single parent and encouraged her to take care of herself. Pt had denied safety concerns. Pt reported that she plans to return to PHP tx tomorrow and hopes that she will be more rested. Pt left for the day approx 12:30pm.   Psychological condition is generally: showing no change. Pt continues to struggle with sleep hygiene.     Plan:    Patient to F/U with PHP.  Patient to F/U with treatment goals as outlined in treatment plan.  Patient to F/U with local ED or call 911 should SI/HI arise.  Visit Diagnosis:  No diagnosis found.    Time  Start Time: 1215  End Time: 1225  Total Time: 10  Cheryl Mcbride LPC @ 12:32 PM

## 2022-06-07 NOTE — GROUP NOTE
Date: 6/7/2022  Start Time:   9:30 AM  End Time: 10:30 AM  Chel Price, YOB: 1975,  was an active group participant.    Community Check In Group  8 attended group today.     Group Focus: Goal of group was to welcome new and returning PHP members to the [facility], check in regarding group member needs, and assess safety.    About the Group: Clinician reviewed [facility] Group Code of Conduct and answered any questions that arose.  Clinician welcomed new members to the group and facilitated brief introductions of group members to one another.  Introduced topic/theme for the treatment day:Distress Tolerance.  Encouraged group members to request support throughout the day as needed.  Clinician reviewed group members’ Morning Reflection Sheets and did a verbal check in with each group member regarding safety (SI/HI/self harm), safety planning, medication compliance, if they needed to consult with anyone today and individual treatment needs/goals for the day.  Session ended with a brief meditation/calming activity.   Tiara Melvin MD was on site when services rendered.    Morning Self reflection:   Depression: 3/5  Anxiety: 2/5  Anger: 2/5  Suicidal Ideation:   0/5 - None  Self Injury: 0/5 - None  Engaged in Self Injury since yesterday: No  Homicidal Ideation:   0/5 - None  Are you able to follow your Safety Plan? Yes.  I can/will:  Call someone, Journal, Use a coping skill, Use grounding with my 5 senses and Call 911 if I'm unsafe.  Substance Use:  No  Self Care:  I completed my self care activity last night:  rested after program  I am satisfied with my daily hygiene:  yes  Nourish:Number of meals eaten yesterday? 3 and Describe:  Normal    Sleep:  Uses sleep aid? yes    and Describe:  Number of hours:  6  Social Interaction:Minimal:  Family and Friends  Physical Activity: No  Mindful Activity: watched my soon at the pool, enjoyed seeing him happy  Medication: Prescribed  Thought Content: clear, cloudy (so tired),  irritable  Pt reported use of coping skills including successful follow through and barriers to follow through: I tried various strategies to try to go to bed - no phone, counting breath, journaling, conceptualizing a container, finally fell asleep 3 hours after I intended  Pt reported significant or positive event/step: Slept more than 4 hours. Had a nice evening with my son and he ate breakfast I made this morning  Pt identified goal for the treatment day: To stay awake again. To better understand why I'm here and how I can improve my outlook/the problems that brought me here without addressing them specifically and directly. Feel like I haven't had any real conversations about the issues I've been struggling with really  Pt treatment plan areas addressed:  Depression, Anxiety, Low Self Esteem, Self Care, Parenting Stress, Sleep Disturbance and Coping Skills  Pt requested consult with: None  Visit Diagnosis:      ICD-10-CM ICD-9-CM   1. Major depressive disorder, recurrent severe without psychotic features (CMS/HCC)  F33.2 296.33   2. NORMA (generalized anxiety disorder)  F41.1 300.02   3. Alcohol use disorder, mild, in early remission  F10.11 305.03   4. Bulimia nervosa  F50.2 307.51         Assessment/Observations:  Chel seemed down today, reports feeling unmotivated and irritable due to continued lack of sleep, though improved to 6 hours from the previous day's 4 hours. She was able to avoid the phone while having difficulty falling asleep and utilized some skills though eventually got up to get a snack before returning to bed. She expressed frustration at the concept of acceptance and coping skills and instead identified wanting to be able to get help addressing her problems specifically, though she did not elaborate on a situation she would like to focus on with group today.  Plan:  Continue with PHP   Pt to F/U with treatment goals as outlined in treatment plan.  Pt to F/U with local ED/call 911 should SI/HI  arises.    Vilma Gray, [facility]

## 2022-06-07 NOTE — GROUP NOTE
Date:  6/7/2022  Start Time:  10:40 AM  End Time:  11:40 AM  Chel Price, YOB: 1975  8 members attended group today.    Group Focus:  Goal of group was to establish and build social support, review coping skills, normalize the struggles of being human, and increase self awareness and self compassion.    About the Group:  Clinician started group with a prompting quote/song to facilitate group discussion.  Group engaged in active and supportive discussion. Topics discussed included trust, identifying and communicating needs, acceptance of treatment/medications.  Group members were able to offer insightful and supportive feedback and suggestions about how to manage difficult situations.  Group ended with a meditation/song.  Tiara Melvin MD was onsite when services were rendered.        Patient  was quiet but attentive.  Assessment/observation of group participation/presentation: Chel did not identify a specific concept from her life that she wanted to discuss in open process despite reporting feeling like she isn't addressing her problems in PHP. She was generally quiet though looking around as others shared. She discussed being very open in her relationships and sometimes sharing too much. She became slightly tearful when acknowledging she may feel like a burden. Following group, Chel identified feeling too tired to stay but safe enough to drive so she left early from the treatment day after nursing assessment.  Treatment plan areas addressed: Depression, Anxiety, Low Self Esteem and Relationship issues/Communication skills  Visit Diagnosis:      ICD-10-CM ICD-9-CM   1. Major depressive disorder, recurrent severe without psychotic features (CMS/HCC)  F33.2 296.33   2. NORMA (generalized anxiety disorder)  F41.1 300.02   3. Alcohol use disorder, mild, in early remission  F10.11 305.03   4. Bulimia nervosa  F50.2 307.51       Plan: Continue with PHP   Pt to F/U with treatment goals as outlined in  treatment plan.  Pt to F/U with local ED/call 911 should SI/HI arises.    IRINA ReesW

## 2022-06-08 ENCOUNTER — PHP (OUTPATIENT)
Dept: PSYCHIATRY | Facility: HOSPITAL | Age: 47
End: 2022-06-08
Attending: PSYCHIATRY & NEUROLOGY
Payer: COMMERCIAL

## 2022-06-08 ENCOUNTER — DOCUMENTATION (OUTPATIENT)
Dept: PSYCHIATRY | Facility: HOSPITAL | Age: 47
End: 2022-06-08
Payer: COMMERCIAL

## 2022-06-08 DIAGNOSIS — F50.20 BULIMIA NERVOSA: ICD-10-CM

## 2022-06-08 DIAGNOSIS — F10.11 ALCOHOL USE DISORDER, MILD, IN EARLY REMISSION: ICD-10-CM

## 2022-06-08 DIAGNOSIS — F33.2 MAJOR DEPRESSIVE DISORDER, RECURRENT SEVERE WITHOUT PSYCHOTIC FEATURES (CMS/HCC): Primary | ICD-10-CM

## 2022-06-08 DIAGNOSIS — F41.1 GAD (GENERALIZED ANXIETY DISORDER): ICD-10-CM

## 2022-06-08 PROCEDURE — H0035 MH PARTIAL HOSP TX UNDER 24H: HCPCS | Performed by: COUNSELOR

## 2022-06-08 NOTE — PROGRESS NOTES
Spoke with outpatient Abbie HOANG, reviewed patient's course of PHP and plan going forward. Informed her of expected discharge date, team to send discharge summary upon completion. NATALYA to contact us with any further questions/concerns.    Marlene Burris MD

## 2022-06-08 NOTE — GROUP NOTE
Date:  6/8/2022  Start Time:  12:10 PM  End Time:   1:10 PM  Chel Price, YOB: 1975  Psychoeducational/Skills Based Group  9 members attended group today    Group Focus: Goal of group was to introduce skills and psychoeducation related to topic of PHP day.   Group members were provided instruction and opportunities to practice presented skills.  Clinician answered questions as they arose and shared how presented skills can be utilized on a daily basis to increase emotional wellness.      About the Group: Trauma Session 8: Trauma Psycho Ed/Skills Based Group Summary  Topic of Curriculum was “Trauma”. Clinician educated group members about the body’s response to trauma and shared the  “Brain on Trauma” worksheet from [employer]. Group members were educated about neuroplasticity and how the brain is changeable through practice of coping skills, grounding techniques and time. Clinician showed a video on Neuroplasticity from [employer]  to highlight changeability of neurons with time and effort.  Clinician introduced and facilitated practice with group members of two grounding techniques, Progressive Muscle Relaxation from [employer] and Bilateral Stimulation “Butterfly hug” from [employer]. Group members then discussed their experiences with one another and Clinician closed group session with a grounding meditation of body scan from   [employer]: Chelle Giles.    Marlene Burris MD was onsite when services were rendered.          Patient  was an active group participant.  Assessment/observation of group participation/presentation: Chel reflected on her experiences in the grounding and visualization exercises, sharing that she feels a sense of safety and grounding when returning to things that represent simpler times, such as a perfume from when she was a teenager. She also discussed how the image of a world map can help her keep things in perspective.   Treatment plan areas addressed: Depression,  Anxiety, Trauma and Coping Skills  Visit Diagnosis:      ICD-10-CM ICD-9-CM   1. Major depressive disorder, recurrent severe without psychotic features (CMS/HCC)  F33.2 296.33   2. NORMA (generalized anxiety disorder)  F41.1 300.02   3. Alcohol use disorder, mild, in early remission  F10.11 305.03   4. Bulimia nervosa  F50.2 307.51       Plan: Continue with PHP   Pt to F/U with treatment goals as outlined in treatment plan.  Pt to F/U with local ED/call 911 should SI/HI arises.    Vilma Gray LCSW

## 2022-06-08 NOTE — GROUP NOTE
Date: 6/8/2022  Start Time:  1:20 PM  End Time:   2:20 PM  Chel Price, YOB: 1975,  was an active group participant.    Wrap Up Group  9 attended group today.   Group Focus: Goal of group was to wrap up and process the treatment day.  Assist  members in planning for the evening ahead and to assess and plan surrounding any  safety needs.      About the Group:  Clinician reviewed group members Afternoon  Reflection Sheets and did a verbal check in with each group member regarding safety  (SI/HI/self harm) and any necessary safety planning .  Session ended with a brief  meditation/calming activity.  Marlene Burris MD was onsite when  services rendered.        Afternoon Self Reflection  Depression: 1/5  Anxiety: 1/5  Anger: 0/5  Suicidal Ideation:   0/5 - None  Self Injury: 0/5 - None  Homicidal Ideation:   0/5 - None  Are you able to follow your Safety Plan? Yes.  I can/will:  Call someone, Journal, Use a coping skill, Use grounding with my 5 senses and Call 911 if I'm unsafe.  Pt reported significant moment/insight of the day: unable to identify- was happy about new skills  Pt reported gratitude list: son, cats, weather  Pt identified goal progress for the day: talked about my mom so yes  Pt reported evening self care plan:swim, bed early  Pt treatment plan areas addressed: Depression, Anxiety, Relationship issues/Communication skills, Self Care, Mood dysregulation, Trauma and Coping Skills    Visit Diagnosis:      ICD-10-CM ICD-9-CM   1. Major depressive disorder, recurrent severe without psychotic features (CMS/HCC)  F33.2 296.33   2. NORMA (generalized anxiety disorder)  F41.1 300.02   3. Alcohol use disorder, mild, in early remission  F10.11 305.03   4. Bulimia nervosa  F50.2 307.51       Assessment/Observations:  Chel shared how she felt tired at the end of the day bit was appreciative of the sleep she got yesterday.  She was hopeful for good sleep again this evening and plans to swim this  afternoon with her son.   Plan: Continue with PHP   Pt to F/U with treatment goals as outlined in treatment plan.  Pt to F/U with local ED/call 911 should SI/HI arises.    Paula Vee, LPC

## 2022-06-08 NOTE — GROUP NOTE
Date: 6/8/2022  Start Time:   9:30 AM  End Time: 10:30 AM  Chel Price, YOB: 1975,  was an active group participant.    Community Check In Group  9 attended group today.     Group Focus: Goal of group was to welcome new and returning PHP members to the [facility], check in regarding group member needs, and assess safety.    About the Group: Clinician reviewed [facility] Group Code of Conduct and answered any questions that arose.  Clinician welcomed new members to the group and facilitated brief introductions of group members to one another.  Introduced topic/theme for the treatment day:Trauma.  Encouraged group members to request support throughout the day as needed.  Clinician reviewed group members’ Morning Reflection Sheets and did a verbal check in with each group member regarding safety (SI/HI/self harm), safety planning, medication compliance, if they needed to consult with anyone today and individual treatment needs/goals for the day.  Session ended with a brief meditation/calming activity.   Marlene Burris MD was on site when services rendered.    Morning Self reflection:   Depression: 1/5  Anxiety: 2/5  Anger: 0/5  Suicidal Ideation:   0/5 - None  Self Injury: 0/5 - None  Engaged in Self Injury since yesterday: No  Homicidal Ideation:   0/5 - None  Are you able to follow your Safety Plan? Yes.  I can/will:  Call someone, Journal, Use a coping skill, Use grounding with my 5 senses and Call 911 if I'm unsafe.  Substance Use:  No  Self Care:  I completed my self care activity last night:  napped and left groups early to rest   I am satisfied with my daily hygiene:  yes  Nourish:Number of meals eaten yesterday? 3 and Describe:  Normal    Sleep:  Uses sleep aid? yes    and Describe:  Number of hours:  8-9  Social Interaction:Minimal:  Family and Friends  Physical Activity: None  Mindful Activity: breathing   Medication: Prescribed and Taking less than prescribed  Thought Content: clear   Pt reported use of  coping skills including successful follow through and barriers to follow through: rest and sleep hygiene   Pt reported significant or positive event/step: got good sleep, dried my hair, put on make up  Pt identified goal for the treatment day: talk about personal problems that led me here to [facility]- where to live, feelings of guilt.   Pt treatment plan areas addressed:  Depression, Anxiety, Relationship issues/Communication skills, Employment/vocational stress, Self Care, Parenting Stress, Trauma, Coping Skills, Mindfulness, CBT skills, Building strengths/resilience and Self-compassion  Pt requested consult with: None  Visit Diagnosis:      ICD-10-CM ICD-9-CM   1. Major depressive disorder, recurrent severe without psychotic features (CMS/HCC)  F33.2 296.33   2. NORMA (generalized anxiety disorder)  F41.1 300.02   3. Alcohol use disorder, mild, in early remission  F10.11 305.03   4. Bulimia nervosa  F50.2 307.51         Assessment/Observations:  Pt shared that she went home yesterday feeling guilty about having left groups. Pt shared that she was able to rest and got good sleep last night. Pt shared that she took Tylenol PM rather than PRN medication. Pt shared that she is glad that she took the time she needed for self-care. Pt shared that [facility] tx is giving her good tools and coping skills, but she would also like to talk about personal issues that led her to being here. Pt was invited to share more in open process group this morning.   Plan:  Continue with PHP   Pt to F/U with treatment goals as outlined in treatment plan.  Pt to F/U with local ED/call 911 should SI/HI arises.    Cheryl Mcbride, LPC

## 2022-06-08 NOTE — GROUP NOTE
Date:  6/8/2022  Start Time:  10:40 AM  End Time:  11:40 AM  Chel Price, YOB: 1975   8 members attended group today.    Group Focus:  Goal of group was to establish and build social support, review coping skills, normalize the struggles of being human, and increase self awareness and self compassion.    About the Group:  Clinician started group with a prompting quote/song to facilitate group discussion.  Group engaged in active and supportive discussion. Topics discussed included window of tolerance, impact trauma can have on current functioning, boundaries and people pleasing behavior.  Group members were able to offer insightful and supportive feedback and suggestions about how to manage difficult situations.  Group ended with a meditation/song.  Marlene Burris MD was onsite when services were rendered.        Patient  was an active group participant.  Assessment/observation of group participation/presentation: Pt shared about her relationship with her mother.Pt shared that she moves to [place] when in college to have space from her mother. Pt shared that she moved back to [place] when she became pregnant to have support from her mother and father. pt shared that she feels grateful to have support as a single parent. Pt shared that she also feels triggered by her mother at times when she is complaing or emotionally needy. Pt shared that she grew up in a dysfunction environment where her mother was always worried about her appearance and body image. Pt shared that she needs to set boundaries with her mother but she feels guilty about it because her parents watch her son multiple days during the week.Pt received support from peers who could relate with her experiences. Pt ID some boundaries that she feels she can set with her mother. Pt also shared that she thought about discharging from [facility] tx because she does not have acute safety concerns. Pt shared that she feels this is helpful and is willing to trust  the process to see how she can utilize this support.   Treatment plan areas addressed: Depression, Anxiety, Self Care, Coping Skills, Mindfulness, CBT skills, Building strengths/resilience and Self-compassion  Visit Diagnosis:      ICD-10-CM ICD-9-CM   1. Major depressive disorder, recurrent severe without psychotic features (CMS/HCC)  F33.2 296.33   2. NORMA (generalized anxiety disorder)  F41.1 300.02   3. Alcohol use disorder, mild, in early remission  F10.11 305.03   4. Bulimia nervosa  F50.2 307.51       Plan: Continue with PHP   Pt to F/U with treatment goals as outlined in treatment plan.  Pt to F/U with local ED/call 911 should SI/HI arises.    Cheryl Mcbride, LPC

## 2022-06-09 ENCOUNTER — PHP (OUTPATIENT)
Dept: PSYCHIATRY | Facility: HOSPITAL | Age: 47
End: 2022-06-09
Attending: PSYCHIATRY & NEUROLOGY
Payer: COMMERCIAL

## 2022-06-09 DIAGNOSIS — F10.11 ALCOHOL USE DISORDER, MILD, IN EARLY REMISSION: ICD-10-CM

## 2022-06-09 DIAGNOSIS — F33.2 MAJOR DEPRESSIVE DISORDER, RECURRENT SEVERE WITHOUT PSYCHOTIC FEATURES (CMS/HCC): Primary | ICD-10-CM

## 2022-06-09 DIAGNOSIS — F41.1 GAD (GENERALIZED ANXIETY DISORDER): ICD-10-CM

## 2022-06-09 DIAGNOSIS — F50.20 BULIMIA NERVOSA: ICD-10-CM

## 2022-06-09 PROCEDURE — H0035 MH PARTIAL HOSP TX UNDER 24H: HCPCS

## 2022-06-09 NOTE — GROUP NOTE
Date: 6/9/2022  Start Time:  1:20 PM  End Time:   2:20 PM  Chel Price, YOB: 1975,  was an active group participant.    Wrap Up Group  7 attended group today.   Group Focus: Goal of group was to wrap up and process the treatment day.  Assist  members in planning for the evening ahead and to assess and plan surrounding any  safety needs.      About the Group:  Clinician reviewed group members Afternoon  Reflection Sheets and did a verbal check in with each group member regarding safety  (SI/HI/self harm) and any necessary safety planning .  Session ended with a brief  meditation/calming activity.  Marlene Burris MD was onsite when  services rendered.        Afternoon Self Reflection  Depression: 2/5  Anxiety: 2/5  Anger: 0/5  Suicidal Ideation:   0/5 - None  Self Injury: 0/5 - None  Homicidal Ideation:   0/5 - None  Are you able to follow your Safety Plan? Yes.  I can/will:  Call someone, Journal, Use a coping skill, Use grounding with my 5 senses and Call 911 if I'm unsafe.  Pt reported significant moment/insight of the day: Really like the RAJIV framework for asking for something I want. Realizing that my mom truly isn't able to not think about herself first at times (pt received had phone call with mom on break today)  Pt reported gratitude list: 1) my son's goofiness 2) all of my basic needs are being met 3) I can freely make choices in life  Pt identified goal progress for the day: Enjoyed the good discussions today, but didn't feel like I've moved the ball forward in terms of advancing my own emotional health challenges. I'm going to begin looking for a trauma-focused therapist who can ideally see me 2x a week going forward. Think intensive therapy focused on sorting through several past events will be more helpful to me at this point  Pt reported evening self care plan:Exercise, I'm quite tired and it may be hard to motivate, so going to aim to get on the treadmill as soon as I get home for  30 mins before my son comes home from school and we go to his school's spring fair  Pt treatment plan areas addressed: Depression, Anxiety, Relationship issues/Communication skills, Trauma and Coping Skills    Visit Diagnosis:      ICD-10-CM ICD-9-CM   1. Major depressive disorder, recurrent severe without psychotic features (CMS/HCC)  F33.2 296.33   2. NORMA (generalized anxiety disorder)  F41.1 300.02   3. Alcohol use disorder, mild, in early remission  F10.11 305.03   4. Bulimia nervosa  F50.2 307.51       Assessment/Observations:  Chel was able to be more concise in her verbal sharing though her written reflections seems to be more verbose and stream-of-consciousness in nature. She is showing some insight into ways in which she may jump to extremes based on interactions with others, particularly with her mother. She shared about a disappointment where she offered to bring her mom to a Bat Mitzvah and her mom's response was that she would have nothing to wear. Chel said this makes her want to move away. LCSW encouraged pt to utilize container visualization tonight if she is ruminating on mom's response and to potentially bring this up for open process tomorrow.  Plan: Continue with PHP   Pt to F/U with treatment goals as outlined in treatment plan.  Pt to F/U with local ED/call 911 should SI/HI arises.    Vilma Gray, [facility]

## 2022-06-09 NOTE — GROUP NOTE
Date:  6/9/2022  Start Time:  10:40 AM  End Time:  11:40 AM  Chel Price, YOB: 1975   7 members attended group today.    Group Focus:  Goal of group was to establish and build social support, review coping skills, normalize the struggles of being human, and increase self awareness and self compassion.    About the Group:  Clinician started group with a prompting quote/song to facilitate group discussion.  Group engaged in active and supportive discussion. Topics discussed included guilt, shame, parenthood, struggling to connect socially, the importance of challenging messages about self-care and misperceptions of being a woman or mother.  Group members were able to offer insightful and supportive feedback and suggestions about how to manage difficult situations.  Group ended with a meditation/song.  Marlene Burris MD was onsite when services were rendered.        Patient  was an active group participant.  Assessment/observation of group participation/presentation: Pt was supportive of peers and shared about her own experiences with motherhood and guilt. Pt also shared about her experiences with social isolation. Pt shared about the little moments when she may connect with a stranger in the store and how this can help improve her mood. Pt shared about making attempts to join classes and groups to meet new people. Pt related to peers about having to reassure herself that by taking care of herself she is a better mom to her son.   Treatment plan areas addressed: Depression, Anxiety, Employment/vocational stress, Self Care, Mood dysregulation, Parenting Stress, Trauma, Coping Skills, Mindfulness and CBT skills  Visit Diagnosis:      ICD-10-CM ICD-9-CM   1. Major depressive disorder, recurrent severe without psychotic features (CMS/HCC)  F33.2 296.33   2. NORMA (generalized anxiety disorder)  F41.1 300.02   3. Alcohol use disorder, mild, in early remission  F10.11 305.03   4. Bulimia nervosa  F50.2 307.51        Plan: Continue with PHP   Pt to F/U with treatment goals as outlined in treatment plan.  Pt to F/U with local ED/call 911 should SI/HI arises.    Cheryl Mcbride, LPC
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
## 2022-06-09 NOTE — GROUP NOTE
Date:  6/9/2022  Start Time:  12:10 PM  End Time:   1:10 PM  Emerson Price, YOB: 1975  Psychoeducational/Skills Based Group  7 members attended group today    Group Focus: Goal of group was to introduce skills and psychoeducation related to topic of PHP day.   Group members were provided instruction and opportunities to practice presented skills.  Clinician answered questions as they arose and shared how presented skills can be utilized on a daily basis to increase emotional wellness.      About the Group: DBT- Interpersonal Effectiveness Session 9: DBT- Interpersonal Effectiveness Psycho Ed/Skills Based Group Summary   Topic of curriculum was “DBT Interpersonal Effectiveness”.  Clinician educated group members on Communication Styles of Passive, Assertive, Aggressive, and Passive-Aggressive and had group  members identify examples of each. Clinician facilitated discussion on how some of these communication styles may have worked well previously, but are not serving group member now.  Clinician used worksheet from [employer] for communication styles. Clinician then centered discussion on the importance of using Assertive communication. Clinician used worksheet on “I Statements” from [employer] and encouraged group members to reflect on their own needs and how they can use “I statements” to communicate these needs. Clinician Closed group session with meditation from Remi Szymanski.     Marlene Burris MD was onsite when services were rendered.          Patient  was an active group participant.  Assessment/observation of group participation/presentation: emerson was very much engaged, working closely with peers in group on a DEAR man and offering well received feedback and support on enhancing the communication.   Treatment plan areas addressed: Depression, Anxiety, Relationship issues/Communication skills, Parenting Stress and Coping Skills  Visit Diagnosis:      ICD-10-CM ICD-9-CM   1. Major  depressive disorder, recurrent severe without psychotic features (CMS/HCC)  F33.2 296.33   2. NORMA (generalized anxiety disorder)  F41.1 300.02   3. Alcohol use disorder, mild, in early remission  F10.11 305.03   4. Bulimia nervosa  F50.2 307.51       Plan: Continue with PHP   Pt to F/U with treatment goals as outlined in treatment plan.  Pt to F/U with local ED/call 911 should SI/HI arises.    Paula Vee, LPC

## 2022-06-10 ENCOUNTER — PHP (OUTPATIENT)
Dept: PSYCHIATRY | Facility: HOSPITAL | Age: 47
End: 2022-06-10
Attending: PSYCHIATRY & NEUROLOGY
Payer: COMMERCIAL

## 2022-06-10 ENCOUNTER — TELEPHONE (OUTPATIENT)
Dept: PSYCHIATRY | Facility: HOSPITAL | Age: 47
End: 2022-06-10
Payer: COMMERCIAL

## 2022-06-10 DIAGNOSIS — F33.41 MDD (MAJOR DEPRESSIVE DISORDER), RECURRENT, IN PARTIAL REMISSION (CMS/HCC): Primary | ICD-10-CM

## 2022-06-10 DIAGNOSIS — F50.20 BULIMIA NERVOSA: ICD-10-CM

## 2022-06-10 DIAGNOSIS — F10.11 ALCOHOL USE DISORDER, MILD, IN EARLY REMISSION: ICD-10-CM

## 2022-06-10 DIAGNOSIS — F41.1 GAD (GENERALIZED ANXIETY DISORDER): ICD-10-CM

## 2022-06-10 DIAGNOSIS — F33.2 MAJOR DEPRESSIVE DISORDER, RECURRENT SEVERE WITHOUT PSYCHOTIC FEATURES (CMS/HCC): Primary | ICD-10-CM

## 2022-06-10 PROCEDURE — H0035 MH PARTIAL HOSP TX UNDER 24H: HCPCS | Performed by: COUNSELOR

## 2022-06-10 PROCEDURE — 99214 OFFICE O/P EST MOD 30 MIN: CPT | Performed by: PSYCHIATRY & NEUROLOGY

## 2022-06-10 RX ORDER — ESCITALOPRAM OXALATE 20 MG/1
20 TABLET ORAL DAILY
Qty: 30 TABLET | Refills: 0 | Status: SHIPPED | OUTPATIENT
Start: 2022-06-10

## 2022-06-10 NOTE — PROGRESS NOTES
"[facility] PSYCHIATRY FOLLOW UP    Chel Price is a 46 y.o. female who presents for Anxiety and Depression.    HPI     Reports only got three hours of sleep last night. Cat not doing well, breathing heavy all night, near end of life, so patient up monitoring cat.     Despite stressors, mood has been \"calm\" over the past week. No longer feeling depressed in the way she did previous to PHP. Reports feeling tired in lgiht of last night.     Anxiety is about 2/5, feels has new tools to cope with life stressors. Compared to start of PHP, feels less intense.     Sleep regimen erratic this week. Stopped hydroxyzine around 5 days ago, did not feel helpful, though unclear if attempted concomitant sleep hygiene strategies. Trialed Tylenol PM on a couple nights, reports took it for both back pain and insomnia, found helpful for sleep. Trialed melatonin with Tylenol PM a couple nights ago, denies feeling highly fatigued the next day. Did not take anything last night as she wanted to be awake for cat.    Focus still scattered but again improved. Has not been taking Adderall in the PM during PHP. Still taking Vyvanse in AM, escitalopram as prescribed    Denies any recent or current thoughts of wanting to be dead, no recent or current thoughts of suicide, no plan, no intent. Future-oriented toward son, work. No HI.     Psychiatric ROS:   Sleep: As per HPI  Appetite: Intact, no urges to purge  Substances: No urges/cravings to drink this week. Continues to take 1/2 tablet of Antabuse this week. No drug use.     Medical Review of Systems  Review of Systems   Const: As per HPI  MSK: + back pain   Neuro: Stopped qulipta for headaches, headaches have been appropriately contorlled  PMSH: Changes made as needed    Risk/Benefit/side Effects discussed regarding the following medications:  As below  PDMP Queried: Yes    Allergies:   Allergies   Allergen Reactions   • Penicillins      Childhood allergy Reaction unknown   • Sulfa (Sulfonamide " "Antibiotics)      Flu like symptoms       Current Outpatient Medications:   •  escitalopram (LEXAPRO) 20 mg tablet, Take 1 tablet (20 mg total) by mouth daily., , Disp: 30 tablet, Rfl: 0  •  B2-magnesium cit,oxid-feverfew (MIGRELIEF) 200-180-50 mg tablet, Take by mouth., , Disp: , Rfl:   •  butalbital-acetaminophen-caff -40 mg per capsule, as needed., , Disp: , Rfl:   •  cetirizine (ZyrTEC) 10 mg tablet, Take 10 mg by mouth daily., , Disp: , Rfl:   •  cyclobenzaprine (FLEXERIL) 5 mg tablet, Take 5 mg by mouth 3 (three) times a day as needed for muscle spasms., , Disp: , Rfl:   •  dextroamphetamine-amphetamine (ADDERALL) 5 mg tablet, every evening. Takes at 12 PM, , Disp: , Rfl:   •  disulfiram (ANTABUSE) 250 mg tablet, Take 250 mg by mouth daily. Takes only 1/4 of a tab, , Disp: , Rfl:   •  metroNIDAZOLE (METROGEL) 0.75 % topical gel, Apply topically 2 (two) times a day., , Disp: , Rfl:   •  montelukast (SINGULAIR) 10 mg tablet, daily., , Disp: , Rfl:   •  multivitamin capsule, Take 1 capsule by mouth daily., , Disp: , Rfl:   •  ubrogepant (UBRELVY) 100 mg tablet tablet, Take 100 mg by mouth as needed for migraine. Second dose may be taken at least 2 hours after initial dose, , Disp: , Rfl:   •  VYVANSE 10 mg capsule, Take by mouth once daily., , Disp: , Rfl:        05/20/2022  2   05/19/2022  Dextroamp-Amphetamine 5 MG Tab    30.00  30 [place]   [id number]   Pen (3231)     [place]   [place]   05/19/2022  2   05/19/2022  Vyvanse 10 MG Capsule    30.00  30 [place]   [id number]   Pen (3917)     [place]   [place]   05/14/2022  2   04/06/2022  Lorazepam 0.5 MG Tablet    20.00  20 [place]   [id number]   Pen (0460)              Objective     Mental Status Exam  Appearance: well groomed, good hygiene  Gait and Motor: no abnormal movements, no tremor, no PMR/PMA  Speech: normal rate/rhythm/volume  Mood: \"calm\"  Affect: anxious, full range, less labile than PHP admission  Associations: intact  Thought Process: circumstantial, occasional " tangents  Thought Content: no auditory or visual hallucinations, no delusionary content  Suicidality/Homicidality: denies SI, denies HI   Judgement/Insight: fair/fair, more limited re: alcohol/substance use  Orientation: Intact to interview  Memory: Intact to interview  Attention: alert  Knowledge: normal  Language: normal         PHQ-9: Total Score-OWL Transcribed: 11  Thoughts that You Would be Better Off Dead or of Hurting Yourself in Some Way: 0-->not at all  NORMA-7 Total Score-OWL Transcribed: 13    [place] Suicide Severity Rating Scale:  Not done today      Safe-T Assessment:  Not done today        Labs  No new labs.    Imaging  Not applicable    ECG   Not applicable.          Brief Psychiatric Formulation: 46 year old woman with past med hx of migraines, past psych hx of MDD, NORMA, ADHD, BN, no past inpt hosp or suicide attempts, who presents for management of mood/anxiety in context of ongoing life stressors, including caring as single mother for special needs son.     Pt with long-standing hx of depression and anxiety. Hx of BN that has been in remission for decades. Mood/anxiety most recently worsening in context of stressors with son to point patient having difficulty motivating self to shower, performing at work, and starting to think about death and her . Given recent SSRI change, continued home escitalopram, Antabuse, and Vyvanse, focused in PHP on non-pharmacologic skills. Stopped lorazepam given hx of alcohol use disorder. Counseled pt extensively on effects of alcohol on mood/anxiety, pt abstained per her report throughout PHP. Trialed hydroxyzine for insomnia and stressed use of sleep hygiene techniques. Pt ultimately did not continue with hydroxyzine and trialed different OTC options during last week of PHP. Encouraged her to stick with one OTC regimen going forward to use consistently to aid in sleep, continue to utilize sleep hygiene, and hold PM Adderall in an effort to aid in sleep.  Overall, patient reports improvement in mood, anxiety, and functioning over course of PHP. Pending no clinical change over weekend, she is appropriate for discharge from [facility] on Monday 6/13.     Pt is at chronic elevated risk of intentional harm to self given hx of depression/anxiety, hx of eating disorder, hx of trauma, hx of substance use. Her acute risk is elevated by ongoing but improved depression/anxiety sx, family/work stressors, though this acute risk is mitigated by lack of recent or current suicidal ideation, lack of plan/intent, lack of prior suicide attempts, commitment to family/work, willingness to abstain from alcohol, treatment-seeking behavior, and future orientation. In sum, her acute risk of intentional harm to self is low. She denies any thoughts of harming others and has no history of violence, so risk to others is low.         Based on [place] Suicide Screen and current clinical assessment, patient is determined Low Risk.  Suicide Risk/Suicidal Ideation will not be added to patient's treatment plan.     Plan:   Recertification: I certify that PHP level of care continues to be required, improvement is expected and without this medically necessary treatment, inpatient psychiatric care would be required.  Chel Price's response to therapeutic intervention has shown gradual improvement. Chel Price remains at risk for psychiatric hospitalization due to moderate to severe mood symptoms and severe anxiety. Treatment goals and coordination of care with multidisciplinary team as outlined in updated treatment plan     - Pending no clinical change, ok to discharge from [facility] on Monday 6/13. Pt uninterested in IOP at this time, plans to follow-up with outpatient CRNP + therapist. Reviewed with patient options for IOP should she become amenable.     - Continue escitalopram 20 mg daily, 30 day script sent today     - Continue Vyvanse 10 mg daily, holding PM Adderall at this time. Reviewed risks of stimulants,  need to seek out PCP for refills during PHP program.     - Stop hydroxyzine. Given benefit this week, encouraged pt to trial diphenhydramine 25-50 mg daily at bedtime PRN insomnia going forward, reviewed risks/benefits/alternatives in my usual fashion including no treatment. Discouraged concomitant use of melatonin given risks of oversedation. Encouraged her to monitor sleep closely and follow-up with psych CRNP if insomnia persists     - Continue Antabuse, encouraged pt to consider taking full dose prescribed by PCP (rather than 1/2 tablet she is currently taking)     - Counseled patient extensively on effects of excessive  alcohol use on physical and emotional health. Instructed pt to abstain going forward, she agrees to do so     - Outpatient psych with Abbie HOANG - pt has appt with her 6/22     - Patient aware of how to contact office prior to next appointment with any issues, after-hours resources. Patient instructed to call 911 or go to nearest ED if thoughts of self-harm, suicide, or violence towards others.     Visit Diagnosis:  No diagnosis found.    Duration:  25 minutes  Marlene Burris MD @ 10:02 AM

## 2022-06-10 NOTE — GROUP NOTE
Date: 6/10/2022  Start Time:   9:30 AM  End Time: 10:30 AM  Chel Price, YOB: 1975,  was an active group participant.    Community Check In Group  9 attended group today.     Group Focus: Goal of group was to welcome new and returning PHP members to the [facility], check in regarding group member needs, and assess safety.    About the Group: Clinician reviewed [facility] Group Code of Conduct and answered any questions that arose.  Clinician welcomed new members to the group and facilitated brief introductions of group members to one another.  Introduced topic/theme for the treatment day:Self Compassion.  Encouraged group members to request support throughout the day as needed.  Clinician reviewed group members’ Morning Reflection Sheets and did a verbal check in with each group member regarding safety (SI/HI/self harm), safety planning, medication compliance, if they needed to consult with anyone today and individual treatment needs/goals for the day.  Session ended with a brief meditation/calming activity.   Marlene Burris MD was on site when services rendered.    Morning Self reflection:   Depression: 1/5  Anxiety: 2/5  Anger: 1/5  Suicidal Ideation:   0/5 - None  Self Injury: 0/5 - None  Engaged in Self Injury since yesterday: No  Homicidal Ideation:   0/5 - None  Are you able to follow your Safety Plan? Yes.  I can/will:  Call someone, Journal, Use a coping skill, Use grounding with my 5 senses and Call 911 if I'm unsafe.  Substance Use:  No  Self Care:  I completed my self care activity last night:  long bubble bath  I am satisfied with my daily hygiene:  yes  Nourish:Number of meals eaten yesterday? 3 and Describe:  Normal and Nutritious    Sleep:  Uses sleep aid? no    and Describe:  Broken and Number of hours:  3  Social Interaction:Moderate: Family and Friends  Physical Activity: walked a lot  Mindful Activity: breathing practice  Medication: Prescribed  Thought Content: clear, cloudy, irritable  Pt  reported use of coping skills including successful follow through and barriers to follow through: mindful breathing, bubble bath  Pt reported significant or positive event/step: looked in [place] [employer] [facility] where friend is moving to get advice on moving  Pt identified goal for the treatment day: grief over historical trauma with death of friend  Pt treatment plan areas addressed:  Depression, Anxiety, Relationship issues/Communication skills, Trauma and Self-compassion  Pt requested consult with: None  Visit Diagnosis:      ICD-10-CM ICD-9-CM   1. Major depressive disorder, recurrent severe without psychotic features (CMS/HCC)  F33.2 296.33   2. NORMA (generalized anxiety disorder)  F41.1 300.02   3. Alcohol use disorder, mild, in early remission  F10.11 305.03   4. Bulimia nervosa  F50.2 307.51         Assessment/Observations:  Chel was engaged in check in, sharing her hopes to address or better understand the trauma response she is encountering today in response to an event that happened 15 years ago.  She was receptive to LPC and group support and encouragement.   Plan:  Continue with PHP   Pt to F/U with treatment goals as outlined in treatment plan.  Pt to F/U with local ED/call 911 should SI/HI arises.    Paula Vee, LPC

## 2022-06-10 NOTE — GROUP NOTE
Date:  6/10/2022  Start Time:  12:10 PM  End Time:   1:10 PM  Chel Price, YOB: 1975  Psychoeducational/Skills Based Group  9 members attended group today    Group Focus: Goal of group was to introduce skills and psychoeducation related to topic of PHP day.   Group members were provided instruction and opportunities to practice presented skills.  Clinician answered questions as they arose and shared how presented skills can be utilized on a daily basis to increase emotional wellness.      About the Group: Self Compassion/Self Love Session 10: Self Compassion/Self Love Psycho Ed/Skills Based Group Summary   Topic of Curriculum was “Self Compassion/Self Love”. Clinician showed Yaima Pinto’s video describing Self-Compassion to help group members understand the use of self compassion as a coping skill. Clinician facilitated discussion as a group directed toward “negative thoughts about self that can be transformed into self-compassionate statements.”  For example, asking members to share negative thoughts that often go through their mind, “Would you say that to someone you love? What might you say instead?” Clinician introduced a few examples of Self-compassionate phrases: “I am only human.” “I am doing the best I can.” “This is really hard right now and others have felt this way too.”  Group members then discussed their experiences and potential utilization of self compassion to help them in the healing process. Clinician closed group session with a 2 minute breathing meditation to help group members practice grounding techniques    Marlene Burris MD was onsite when services were rendered.          Patient  was an active group participant.  Assessment/observation of group participation/presentation: Despite sharing about feeling very tired, Chel contributed to the conversation about self-criticism. She shared about things she says to her friends that are compassionate, and we normalized how it is  difficult and new to practice this with ourselves. She asked clarifying questions and engaged appropriately.  Treatment plan areas addressed: Depression, Anxiety and Self-compassion  Visit Diagnosis:      ICD-10-CM ICD-9-CM   1. Major depressive disorder, recurrent severe without psychotic features (CMS/HCC)  F33.2 296.33   2. NORMA (generalized anxiety disorder)  F41.1 300.02   3. Alcohol use disorder, mild, in early remission  F10.11 305.03   4. Bulimia nervosa  F50.2 307.51       Plan: Continue with PHP   Pt to F/U with treatment goals as outlined in treatment plan.  Pt to F/U with local ED/call 911 should SI/HI arises.    Vilma Gray LCSW

## 2022-06-10 NOTE — GROUP NOTE
Date:  6/10/2022  Start Time:  10:40 AM  End Time:  11:40 AM  Chel Price, YOB: 1975   9 members attended group today.    Group Focus:  Goal of group was to establish and build social support, review coping skills, normalize the struggles of being human, and increase self awareness and self compassion.    About the Group:  Clinician started group with a prompting quote/song to facilitate group discussion.  Group engaged in active and supportive discussion. Topics discussed included radical acceptance, interpersonal effectiveness, emotion regulation, distress tolerance, mindfulness, DBT, family systems, coping ahead and self care and self compassion. .  Group members were able to offer insightful and supportive feedback and suggestions about how to manage difficult situations.  Group ended with a meditation/song.  Marlene Burris MD was onsite when services were rendered.        Patient  was an active group participant.  Assessment/observation of group participation/presentation: Chel was engage din group discussions and offered empathic supportive feedback and skills to group discussions. S he shared about her own trauma response and how her family system has impacted her emotional regulation.   Treatment plan areas addressed: Depression, Anxiety, Relationship issues/Communication skills, Social Isolation, Coping Skills and Self-compassion  Visit Diagnosis:      ICD-10-CM ICD-9-CM   1. Major depressive disorder, recurrent severe without psychotic features (CMS/HCC)  F33.2 296.33   2. NORMA (generalized anxiety disorder)  F41.1 300.02   3. Alcohol use disorder, mild, in early remission  F10.11 305.03   4. Bulimia nervosa  F50.2 307.51       Plan: Continue with PHP   Pt to F/U with treatment goals as outlined in treatment plan.  Pt to F/U with local ED/call 911 should SI/HI arises.    Paula Vee, LPC

## 2022-06-10 NOTE — GROUP NOTE
Date: 6/10/2022  Start Time:  1:20 PM  End Time:   2:20 PM  Chel Price, YOB: 1975,  was an active group participant.    Wrap Up Group   9  attended group today.   Group Focus: Goal of group was to wrap up and process the treatment day.  Assist  members in planning for the evening ahead and to assess and plan surrounding any  safety needs.      About the Group:  Clinician reviewed group members Afternoon  Reflection Sheets and did a verbal check in with each group member regarding safety  (SI/HI/self harm) and any necessary safety planning .  Session ended with a brief  meditation/calming activity.  Marlene Burris MD was onsite when  services rendered.        Afternoon Self Reflection  Depression: 1/5  Anxiety: 2/5  Anger: 0/5  Suicidal Ideation:   0/5 - None  Self Injury: 0/5 - None  Homicidal Ideation:   0/5 - None  Are you able to follow your Safety Plan? Yes.  I can/will:  Call someone, Journal, Use a coping skill and Use grounding with my 5 senses  Pt reported significant moment/insight of the day: Self-compassion topic  Pt reported gratitude list: weekend with time to rest, friends support, cozy home   Pt identified goal progress for the day: meeting goal by enhancing self-compassion and practicing skills to help with past trauma  Pt reported evening self care plan: take a walk, swim, milk and honey tea before bed   Pt treatment plan areas addressed: Depression, Anxiety, Self Care, Parenting Stress, Trauma, Sleep Disturbance, Coping Skills, Mindfulness, CBT skills, Building strengths/resilience and Self-compassion    Visit Diagnosis:      ICD-10-CM ICD-9-CM   1. Major depressive disorder, recurrent severe without psychotic features (CMS/HCC)  F33.2 296.33   2. NORMA (generalized anxiety disorder)  F41.1 300.02   3. Alcohol use disorder, mild, in early remission  F10.11 305.03   4. Bulimia nervosa  F50.2 307.51       Assessment/Observations:  Pt shared that she appreciated the topic of  self-compassion today. Pt shared that she did not sleep well last night and fought herself about coming to [facility] tx today. Pt shared that she is happy she came because learning about self-compassion was helpful. Pt shared that she wants to begin writing again and she thinks that she can start by writing a letter of self-compassion. Pt shared that she has a social gathering tomorrow with friends and has some angst about being there with her son. Pt shared that she plans to use coping skills and connect with her best friend over the weekend. Pt denied safety concerns and denied further needs heading into the weekend.   Plan: Continue with PHP   Pt to F/U with treatment goals as outlined in treatment plan.  Pt to F/U with local ED/call 911 should SI/HI arises.    Cheryl Mcbride, LPC

## 2022-06-10 NOTE — TELEPHONE ENCOUNTER
LPC spoke with pt's mother, Madeline, about pt's up coming discharge. Pt's mother shared that she thinks pt may want a new OP therapist. LPC had provided pt with a list of alternative providers if pt would like to seek out new OP therapist. LPC encouraged pt to return to current OP therapist in the mean time. ANDREAS spoke with pt's mother about recommendations for IOP tx and pt's decision to F/U with individual providers. Pt's mother did not have any further questions or concerns at this time. LPC informed pt's mother that she or pt can call [facility] any time they have questions or concerns.

## 2022-06-10 NOTE — TELEPHONE ENCOUNTER
LPC spoke with pt's OP therapist, Dr. Garza about up coming discharge from PHP tx and F/U care. Dr. Garza was interested to hear about pt's progress while in PHP tx.  He inquired about pt's commitment to abstinence from alcohol. He inquired about recommendations for aftercare. LPC discussed recommended LOC for IOP tx and pt's decision to F/U with OP therapy instead. LPC spoke with Dr. Garza about increasing sessions to weekly given pt will not be doing IOP tx. Dr. Garza was supportive of this plan. LPC spoke about other tx goals that pt has worked on while in PHP tx. Dr. Garza was appreciative of the call, and provided LPC with contact info to send DC summary once pt completes tx.

## 2022-06-13 ENCOUNTER — PHP (OUTPATIENT)
Dept: PSYCHIATRY | Facility: HOSPITAL | Age: 47
End: 2022-06-13
Attending: PSYCHIATRY & NEUROLOGY
Payer: COMMERCIAL

## 2022-06-13 ENCOUNTER — PHP (OUTPATIENT)
Dept: PSYCHIATRY | Facility: HOSPITAL | Age: 47
End: 2022-06-13
Attending: COUNSELOR
Payer: COMMERCIAL

## 2022-06-13 ENCOUNTER — TELEPHONE (OUTPATIENT)
Dept: PSYCHIATRY | Facility: HOSPITAL | Age: 47
End: 2022-06-13
Payer: COMMERCIAL

## 2022-06-13 DIAGNOSIS — F50.20 BULIMIA NERVOSA: ICD-10-CM

## 2022-06-13 DIAGNOSIS — F41.1 GAD (GENERALIZED ANXIETY DISORDER): ICD-10-CM

## 2022-06-13 DIAGNOSIS — F10.11 ALCOHOL USE DISORDER, MILD, IN EARLY REMISSION: ICD-10-CM

## 2022-06-13 DIAGNOSIS — F33.41 MDD (MAJOR DEPRESSIVE DISORDER), RECURRENT, IN PARTIAL REMISSION (CMS/HCC): Primary | ICD-10-CM

## 2022-06-13 PROCEDURE — 90837 PSYTX W PT 60 MINUTES: CPT | Performed by: COUNSELOR

## 2022-06-13 PROCEDURE — H0035 MH PARTIAL HOSP TX UNDER 24H: HCPCS

## 2022-06-13 ASSESSMENT — COGNITIVE AND FUNCTIONAL STATUS - GENERAL
INSIGHT: INTACT
PERCEPTUAL FUNCTION: NORMAL
SPEECH: REGULAR
IMPULSE CONTROL: INTACT
THOUGHT_CONTENT: APPROPRIATE
ATTENTION: WNL
CONCENTRATION: WNL
DELUSIONS: NONE OR AGE APPROPRIATE
REMOTE MEMORY: WNL
EST. PREMORBID INTELLIGENCE: NO CHANGE
MOOD: EUTHYMIC (NORMAL)
APPETITE: NO CHANGE
SLEEP_WAKE_CYCLE: NO CHANGE
APPEARANCE: WELL GROOMED
ORIENTATION: FULLY ORIENTED
AROUSAL LEVEL: ALERT
THOUGHT_PROCESS: WNL
AFFECT: FULL RANGE
RECENT MEMORY: WNL
LIBIDO: NON-CONTRIBUTORY
PSYCHOMOTOR FUNCTIONING: WNL
EYE_CONTACT: WNL

## 2022-06-13 NOTE — TELEPHONE ENCOUNTER
LPC sent pt's OP psychiatrist and OP psychologist discharge summary from PHP tx. Pt completed PHP tx today and was aware that LPC was sending discharge summary to both OP providers.

## 2022-06-13 NOTE — DISCHARGE SUMMARY
PHP Discharge Summary     Patient Name: Chel Price  : 1975  Treatment start date: 22  Treatment end date: 22    Discharge Type: Mental Health  Discharge Reason: Program Complete    Reason for admission and treatment: Chel is a 46 y.o. single mother to a 6 y.o. boy who has ASD and ADHD. Pt admitted to [facility] tx to address depression, stress, and hx of trauma. Pt denies SI/HI/SIB/AVH. She reported a hx of problematic alcohol use, for which she sought out antabuse 1.5 years ago by her PCP.  She identified loneliness and isolation as a significant challenge for her, as she moved back to [place] just before the birth of her son after spending several years in [place], and works from home.  Pt was also having disrupted sleep and/or poor sleep when pt admitted to [facility] tx.     Services offered and response to treatment: Pt attended and engaged in 10 sessions of PHP tx which included group therapy, individual therapy, family involvement, and psychiatry visits. Pt gained insight into tools and coping skills that are helpful for stress management and trauma response. Pt was supportive of peers and was willing to share about concerns that she has with triggers and relationships in her life. Pt continued to remain abstinent from alcohol while in PHP tx. Pt felt that she needed more individualized care upon completion of PHP tx.     Summary of Psychiatric Care:  Brief Psychiatric Formulation: 46 year old woman with past med hx of migraines, past psych hx of MDD, NORMA, ADHD, BN, no past inpt hosp or suicide attempts, who presents for management of mood/anxiety in context of ongoing life stressors, including caring as single mother for special needs son.     Pt with long-standing hx of depression and anxiety. Hx of BN that has been in remission for decades. Mood/anxiety most recently worsening in context of stressors with son to point patient having difficulty motivating self to shower, performing at work, and starting to  think about death and her . Given recent SSRI change, continued home escitalopram, Antabuse, and Vyvanse, focused in PHP on non-pharmacologic skills. Stopped lorazepam given hx of alcohol use disorder. Counseled pt extensively on effects of alcohol on mood/anxiety, pt abstained per her report throughout PHP. Trialed hydroxyzine for insomnia and stressed use of sleep hygiene techniques. Pt ultimately did not continue with hydroxyzine and trialed different OTC options during last week of PHP. Encouraged her to stick with one OTC regimen going forward to use consistently to aid in sleep, continue to utilize sleep hygiene, and hold PM Adderall in an effort to aid in sleep. Overall, patient reports improvement in mood, anxiety, and functioning over course of PHP. Pending no clinical change over weekend, she is appropriate for discharge from [facility] on .     Pt is at chronic elevated risk of intentional harm to self given hx of depression/anxiety, hx of eating disorder, hx of trauma, hx of substance use. Her acute risk is elevated by ongoing but improved depression/anxiety sx, family/work stressors, though this acute risk is mitigated by lack of recent or current suicidal ideation, lack of plan/intent, lack of prior suicide attempts, commitment to family/work, willingness to abstain from alcohol, treatment-seeking behavior, and future orientation. In sum, her acute risk of intentional harm to self is low. She denies any thoughts of harming others and has no history of violence, so risk to others is low.        Current Outpatient Medications:   •  B2-magnesium cit,oxid-feverfew (MIGRELIEF) 200-180-50 mg tablet, Take by mouth., , Disp: , Rfl:   •  butalbital-acetaminophen-caff -40 mg per capsule, as needed., , Disp: , Rfl:   •  cetirizine (ZyrTEC) 10 mg tablet, Take 10 mg by mouth daily., , Disp: , Rfl:   •  cyclobenzaprine (FLEXERIL) 5 mg tablet, Take 5 mg by mouth 3 (three) times a day as needed for  muscle spasms., , Disp: , Rfl:   •  dextroamphetamine-amphetamine (ADDERALL) 5 mg tablet, every evening. Takes at 12 PM, , Disp: , Rfl:   •  disulfiram (ANTABUSE) 250 mg tablet, Take 250 mg by mouth daily. Takes only 1/4 of a tab, , Disp: , Rfl:   •  escitalopram (LEXAPRO) 20 mg tablet, Take 1 tablet (20 mg total) by mouth daily., , Disp: 30 tablet, Rfl: 0  •  metroNIDAZOLE (METROGEL) 0.75 % topical gel, Apply topically 2 (two) times a day., , Disp: , Rfl:   •  montelukast (SINGULAIR) 10 mg tablet, daily., , Disp: , Rfl:   •  multivitamin capsule, Take 1 capsule by mouth daily., , Disp: , Rfl:   •  ubrogepant (UBRELVY) 100 mg tablet tablet, Take 100 mg by mouth as needed for migraine. Second dose may be taken at least 2 hours after initial dose, , Disp: , Rfl:   •  VYVANSE 10 mg capsule, Take by mouth once daily., , Disp: , Rfl:     Client status - Condition upon discharge: Pt’s mood was stable upon discharge. Pt was oriented x 4.   Screening Assessments done this visit:  PHQ-9: Total Score-OWL Transcribed: 10  Thoughts that You Would be Better Off Dead or of Hurting Yourself in Some Way: 0-->not at all  NORMA-7 Total Score-OWL Transcribed: 8  [place]  Depression Screening: Not indicated           Clinical concerns to be addressed in continued care: Pt will benefit from addressing individual concerns about personal relationships as well as living situation. Pt will benefit from continuing to utilize stress management and grounding skills to help regulate her ANS as well as co-regulating her son’s ANS. Pt will benefit from setting boundaries as needed. Pt will benefit from continuing to work on sleep hygiene, and utilizing mindfulness practices/breathing techniques to help with racing thoughts.       Aftercare appointments: Pt was recommended to attend an intake appt for IOP tx but pt felt that OP therapy and OP psychiatry would be better for her at time of discharge. Pt plans to F/U with OP Psychiatry, Abbie  Terrell LOZADA on 6/22/22 for medication management. Pt plans to F/U with OP provider, Dr. Garza for on going therapy. Pt was also given an additional list of OP providers for reference.     Special Instructions: None    Medical Follow Up needed?  Yes; Other: See psychiatry rec and F/U with PCP as needed         Mental Health Crisis Support:    [facility] Crisis Number: [phone]   [facility] Crisis Number: [phone]   [facility] Crisis Number: [phone]   [facility] Crisis Number: [phone]      In case of Mental Health Crisis, go to the closest Emergency Department, call 9-1-1, or contact the National Suicide Prevention Hotline at: 1-358.620.1853  Other Support Agencies:  [place] of Mental Illness: [phone]    [place] Advocacy System: [phone]    Depression [facility] Bipolar [place]: [phone]      Patient offered a copy of plan? Yes    Patient provided a copy? Yes        Cheryl Mcbride LPC @ 9:18 AM

## 2022-06-13 NOTE — GROUP NOTE
Date:  6/13/2022  Start Time:  12:10 PM  End Time:   1:10 PM  Chel Price, YOB: 1975  Psychoeducational/Skills Based Group  9 members attended group today    Group Focus: Goal of group was to introduce skills and psychoeducation related to topic of PHP day.   Group members were provided instruction and opportunities to practice presented skills.  Clinician answered questions as they arose and shared how presented skills can be utilized on a daily basis to increase emotional wellness.      About the Group: Mindfulness Topic of curriculum was Mindfulness.” Clinician further introduced the concept of mindfulness by showing group members a Dick Wilhelm video. Clinician educated group members on the many different ways to use mindfulness day to day, moment to moment by sharing a Skills Overview worksheets. Concepts described on the worksheets included: 1) observe, 2) describe, 3) participate, 4) non-judgmental stance, 5) one-mindfully, 6) effectively, 7) wise mind (including a video description). The goal of sharing the skills was to provide multiple techniques for practicing mindfulness in our lives. Clinician then encouraged group members to complete a Wise Mind worksheet to relate their own experience to the concept of Wise Mind. Clinician facilitated a group discussion to check for understanding of presented skills. Clinician ended group by leading group members through a “5 Senses” exercises to help group members link practice of Observe, Describe and One-Mindfully skills.    Tiara Melvin MD was onsite when services were rendered.          Patient  was an active group participant.  Assessment/observation of group participation/presentation: Chel was engaged, relating discussions of effective participation to her life and th impact of judgements.   Treatment plan areas addressed: Depression, Anxiety, Relationship issues/Communication skills and Mindfulness  Visit Diagnosis:      ICD-10-CM ICD-9-CM   1.  MDD (major depressive disorder), recurrent, in partial remission (CMS/HCC)  F33.41 296.35   2. NORMA (generalized anxiety disorder)  F41.1 300.02   3. Bulimia nervosa  F50.2 307.51   4. Alcohol use disorder, mild, in early remission  F10.11 305.03       Plan: Discharge from [facility]. Patient to f/u with OP providers  Pt to F/U with treatment goals as outlined in treatment plan.  Pt to F/U with local ED/call 911 should SI/HI arises.    Paula Vee, LPC

## 2022-06-13 NOTE — GROUP NOTE
Date: 6/13/2022  Start Time:  1:20 PM  End Time:   2:20 PM  Chel Price, YOB: 1975,  was an active group participant.    Wrap Up Group  9 attended group today.   Group Focus: Goal of group was to wrap up and process the treatment day.  Assist  members in planning for the evening ahead and to assess and plan surrounding any  safety needs.      About the Group:  Clinician reviewed group members Afternoon  Reflection Sheets and did a verbal check in with each group member regarding safety  (SI/HI/self harm) and any necessary safety planning .  Session ended with a brief  meditation/calming activity.  Tiara Melvin MD was onsite when  services rendered.        Afternoon Self Reflection  Depression: 1/5  Anxiety: 1/5  Anger: 0/5  Suicidal Ideation:   0/5 - None  Self Injury: 0/5 - None  Homicidal Ideation:   0/5 - None  Are you able to follow your Safety Plan? Yes.  I can/will:  Call someone, Journal, Use a coping skill, Use grounding with my 5 senses and Call 911 if I'm unsafe.  Pt reported significant moment/insight of the day: That mindfulness can be something to incorporate throughout the day  Pt reported gratitude list: son, finishing this program, flexible job  Pt identified goal progress for the day: Had a good last day  Pt reported evening self care plan: Attend [facility] with son and then take a bath  Pt treatment plan areas addressed: Depression, Anxiety, Self Care, Parenting Stress, Coping Skills and Mindfulness    Visit Diagnosis:      ICD-10-CM ICD-9-CM   1. MDD (major depressive disorder), recurrent, in partial remission (CMS/HCC)  F33.41 296.35   2. NORMA (generalized anxiety disorder)  F41.1 300.02   3. Bulimia nervosa  F50.2 307.51   4. Alcohol use disorder, mild, in early remission  F10.11 305.03       Assessment/Observations:  Chel shared that she has seen herself make progress across her time in PHP and that she hopes to continue to integrate the skills outside. She shared that mindfulness  "is something that may help her from her tendency to \"jump ahead 10 steps\" and she expressed that she can feel overwhelmed by the flexibility she has but that she is trying to see the good in it.  Plan: Discharge from PHP. Patient to f/u with OP providers  Pt to F/U with treatment goals as outlined in treatment plan.  Pt to F/U with local ED/call 911 should SI/HI arises.    Vilma Gray LCSW        "

## 2022-06-13 NOTE — PROGRESS NOTES
[facility] Psychotherapy Note    Chel Price is a 46 y.o. female who presents for Discharge From Treatment.     Treatment Plan areas addressed during this visit:  LPC met with pt to review AVS and progress in tx. Pt is scheduled to discharge from [facility] today.     Mental Status Exam:  Arousal Level: Alert  Appearance: Well Groomed  Speech: Regular (rapid)  Psychomotor Functioning: WNL  Eye Contact: WNL  Est. Premorbid Intelligence: No change  Orientation: Fully oriented  Attention: WNL  Concentration: WNL  Recent Memory: WNL  Remote Memory: WNL  Thought Content: Appropriate (Guarded around nature of trauma)  Thought Process: WNL  Insight: Intact  Perceptual Function: Normal  Delusions: None or age appropriate  Sleeping: No Change  Appetite: No Change  Libido: Non-Contributory  Affect: Full Range  Mood: Euthymic (normal)     PHQ-9: Total Score-OWL Transcribed: 10  Thoughts that You Would be Better Off Dead or of Hurting Yourself in Some Way: 0-->not at all  NORMA-7 Total Score-OWL Transcribed: 8    [place] Suicide Severity Rating Scale:  Not indicated      Safe-T Assessment:  Not indicated        Assessment:   Pt is discharging from [facility] tx today and stepping down to OP providers. Pt shared that she plans to reconnect with Dr. Garza whom she has seen on and off for years. Pt shared that she also would like to look into seeing a different therapist and perhaps get a new perspective. Pt shared that Dr. Garza knows her well but is also semi-retired.  Pt shared that she plans to follow up with Abbie Mosquera DNP who manages her medications. Pt shared about feeling triggered recently be a trauma that occurred in October. Pt shared that she feels triggered just walking in her neighborhood and also saw people at the school fair lat week which brought back some feelings of shame.  Pt shared that the trigger is associated with police involvement and an incident with her son from this past Fall. This invited pt to share about her  "experience and the trigger that happened recently. Pt shared that she feels that she needs to exercise more and incorporate this into her daily life. Pt shared that she does like walking in her neighborhood anymore because she lives in close proximity to someone who was involved with this incident. Pt shared that her son had an inappropriate reaction with a classmate who is female, and also the daughter of pt's long time friend. Pt shared that this friend stopped talking to her and will not allow her daughter to be around pt's son. Pt shared that the po,ice were called and the way in which they showed up to pt's house and interacted with the pt was traumatizing. Pt shared that prior to this her son loved policemen and police cars. Pt shared about the shame she has felt and the concern for her son given he had to switch buses and cannot talk with this female at school. LPC spoke with pt about ways that she can influence her son's self-esteem and talk with him about guilt versus shame to help him get through the situation. LPC spoke with pt about [place] of control, and encouraged pt to not allow the trauma response to prevent her form enjoying activities that she is entitled to. Pt also shared about how being back in [place] has created dependency again on her parents, and how she associates this area with negative things. Pt shared that she has a the impulse to move and to \"run or escape\" these things. LPC spoke with pt about working through these things to make e healthy choice for moving versus basing this decision on emotional discomfort. Pt agreed with this, and shared that she wants to take time to work through these issues in therapy. Pt reviewed her discharge summary and signed consent for DC summary. Pt is aware that LPC will send DC summary to pt's OP providers upon completion of PHP tx today. Pt has gained insight into trauma responses, coping skills and need for individualized care moving forward. Pt recognized " that a lot of stressors led her to need additional support prior to admitting to PHP tx.   Psychological condition is generally: improving    Plan:    Patient to discharge from  PHP today. Pt will F/U with OP providers.   Patient to F/U with treatment goals as outlined in treatment plan.  Patient to F/U with local ED or call 911 should SI/HI arise.  Visit Diagnosis:    ICD-10-CM ICD-9-CM   1. MDD (major depressive disorder), recurrent, in partial remission (CMS/HCC)  F33.41 296.35   2. NORMA (generalized anxiety disorder)  F41.1 300.02   3. Alcohol use disorder, mild, in early remission  F10.11 305.03       Time  Start Time: 0830  End Time: 0930  Total Time: 60  Cheryl Mcbride LPC @ 12:34 PM

## 2022-06-13 NOTE — PATIENT INSTRUCTIONS
Patient Name: Chel Price  : 1975  Treatment start date: 22  Treatment end date: 22     Discharge Type: Mental Health  Discharge Reason: Program Complete     Reason for admission and treatment: Chel is a 46 y.o. single mother to a 6 y.o. boy who has ASD and ADHD. Pt admitted to [facility] tx to address depression, stress, and hx of trauma. Pt denies SI/HI/SIB/AVH. She reported a hx of problematic alcohol use, for which she sought out antabuse 1.5 years ago by her PCP.  She identified loneliness and isolation as a significant challenge for her, as she moved back to [place] just before the birth of her son after spending several years in [place], and works from home.  Pt was also having disrupted sleep and/or poor sleep when pt admitted to [facility] tx.      Services offered and response to treatment: Pt attended and engaged in 10 sessions of PHP tx which included group therapy, individual therapy, family involvement, and psychiatry visits. Pt gained insight into tools and coping skills that are helpful for stress management and trauma response. Pt was supportive of peers and was willing to share about concerns that she has with triggers and relationships in her life. Pt continued to remain abstinent from alcohol while in PHP tx. Pt felt that she needed more individualized care upon completion of PHP tx.      Summary of Psychiatric Care:  Brief Psychiatric Formulation: 46 year old woman with past med hx of migraines, past psych hx of MDD, NORMA, ADHD, BN, no past inpt hosp or suicide attempts, who presents for management of mood/anxiety in context of ongoing life stressors, including caring as single mother for special needs son.     Pt with long-standing hx of depression and anxiety. Hx of BN that has been in remission for decades. Mood/anxiety most recently worsening in context of stressors with son to point patient having difficulty motivating self to shower, performing at work, and starting to think about death and  her . Given recent SSRI change, continued home escitalopram, Antabuse, and Vyvanse, focused in PHP on non-pharmacologic skills. Stopped lorazepam given hx of alcohol use disorder. Counseled pt extensively on effects of alcohol on mood/anxiety, pt abstained per her report throughout PHP. Trialed hydroxyzine for insomnia and stressed use of sleep hygiene techniques. Pt ultimately did not continue with hydroxyzine and trialed different OTC options during last week of PHP. Encouraged her to stick with one OTC regimen going forward to use consistently to aid in sleep, continue to utilize sleep hygiene, and hold PM Adderall in an effort to aid in sleep. Overall, patient reports improvement in mood, anxiety, and functioning over course of PHP. Pending no clinical change over weekend, she is appropriate for discharge from [facility] on .     Pt is at chronic elevated risk of intentional harm to self given hx of depression/anxiety, hx of eating disorder, hx of trauma, hx of substance use. Her acute risk is elevated by ongoing but improved depression/anxiety sx, family/work stressors, though this acute risk is mitigated by lack of recent or current suicidal ideation, lack of plan/intent, lack of prior suicide attempts, commitment to family/work, willingness to abstain from alcohol, treatment-seeking behavior, and future orientation. In sum, her acute risk of intentional harm to self is low. She denies any thoughts of harming others and has no history of violence, so risk to others is low.           Current Outpatient Medications:     B2-magnesium cit,oxid-feverfew (MIGRELIEF) 200-180-50 mg tablet, Take by mouth., , Disp: , Rfl:     butalbital-acetaminophen-caff -40 mg per capsule, as needed., , Disp: , Rfl:     cetirizine (ZyrTEC) 10 mg tablet, Take 10 mg by mouth daily., , Disp: , Rfl:     cyclobenzaprine (FLEXERIL) 5 mg tablet, Take 5 mg by mouth 3 (three) times a day as needed for muscle spasms., , Disp: ,  Rfl:     dextroamphetamine-amphetamine (ADDERALL) 5 mg tablet, every evening. Takes at 12 PM, , Disp: , Rfl:     disulfiram (ANTABUSE) 250 mg tablet, Take 250 mg by mouth daily. Takes only 1/4 of a tab, , Disp: , Rfl:     escitalopram (LEXAPRO) 20 mg tablet, Take 1 tablet (20 mg total) by mouth daily., , Disp: 30 tablet, Rfl: 0    metroNIDAZOLE (METROGEL) 0.75 % topical gel, Apply topically 2 (two) times a day., , Disp: , Rfl:     montelukast (SINGULAIR) 10 mg tablet, daily., , Disp: , Rfl:     multivitamin capsule, Take 1 capsule by mouth daily., , Disp: , Rfl:     ubrogepant (UBRELVY) 100 mg tablet tablet, Take 100 mg by mouth as needed for migraine. Second dose may be taken at least 2 hours after initial dose, , Disp: , Rfl:     VYVANSE 10 mg capsule, Take by mouth once daily., , Disp: , Rfl:      Client status - Condition upon discharge: Pt’s mood was stable upon discharge. Pt was oriented x 4.   Screening Assessments done this visit:  PHQ-9: Total Score-OWL Transcribed: 10  Thoughts that You Would be Better Off Dead or of Hurting Yourself in Some Way: 0-->not at all  NORMA-7 Total Score-OWL Transcribed: 8  [place]  Depression Screening: Not indicated            Clinical concerns to be addressed in continued care: Pt will benefit from addressing individual concerns about personal relationships as well as living situation. Pt will benefit from continuing to utilize stress management and grounding skills to help regulate her ANS as well as co-regulating her son’s ANS. Pt will benefit from setting boundaries as needed. Pt will benefit from continuing to work on sleep hygiene, and utilizing mindfulness practices/breathing techniques to help with racing thoughts.         Aftercare appointments: Pt was recommended to attend an intake appt for IOP tx but pt felt that OP therapy and OP psychiatry would be better for her at time of discharge. Pt plans to F/U with OP Psychiatry, Abbie Tejada DNP on 22 for  medication management. Pt plans to F/U with OP provider, Dr. Garza for on going therapy. Pt was also given an additional list of OP providers for reference.      Special Instructions: None     Medical Follow Up needed?  Yes; Other: See psychiatry rec and F/U with PCP as needed            Mental Health Crisis Support:    [facility] Crisis Number: [phone]   [facility] Crisis Number: [phone]   [facility] Crisis Number: [phone]   [facility] Crisis Number: [phone]       In case of Mental Health Crisis, go to the closest Emergency Department, call 9-1-1, or contact the National Suicide Prevention Hotline at: 1-500.847.8747  Other Support Agencies:  [place] [employer] [place] [employer]: 162.109.3015    [place] Advocacy System: 978.772.5559    Dep[employer] [facility] [place]: [phone]       Patient offered a copy of plan? Yes     Patient provided a copy? Yes           Cheryl Mcbride LPC @ 9:18 AM

## 2022-06-13 NOTE — GROUP NOTE
Date: 6/13/2022  Start Time:   9:30 AM  End Time: 10:30 AM  Chel Price, YOB: 1975,  was an active group participant.    Community Check In Group  9 attended group today.     Group Focus: Goal of group was to welcome new and returning PHP members to the [facility], check in regarding group member needs, and assess safety.    About the Group: Clinician reviewed [facility] Group Code of Conduct and answered any questions that arose.  Clinician welcomed new members to the group and facilitated brief introductions of group members to one another.  Introduced topic/theme for the treatment day:Mindfulness.  Encouraged group members to request support throughout the day as needed.  Clinician reviewed group members’ Morning Reflection Sheets and did a verbal check in with each group member regarding safety (SI/HI/self harm), safety planning, medication compliance, if they needed to consult with anyone today and individual treatment needs/goals for the day.  Session ended with a brief meditation/calming activity.   Tiara Melvin MD was on site when services rendered.    Morning Self reflection:   Depression: 1/5  Anxiety: 2/5  Anger: 0/5  Suicidal Ideation:   0/5 - None  Self Injury: 0/5 - None  Engaged in Self Injury since yesterday: No  Homicidal Ideation:   0/5 - None  Are you able to follow your Safety Plan? Yes.  I can/will:  Call someone, Journal, Use a coping skill, Listen to music, Use grounding with my 5 senses and Call 911 if I'm unsafe.  Substance Use:  No  Self Care:  I completed my self care activity last night:  breathing, talking to sister   I am satisfied with my daily hygiene:  yes  Nourish:Number of meals eaten yesterday? 3 and Describe:  Normal and Nutritious    Sleep:  Uses sleep aid? yes    and Describe:  Restless and Number of hours:  6.5  Social Interaction:Minimal:  Family and Friends  Physical Activity: None  Mindful Activity: Box breathing   Medication: Prescribed  Thought Content: clear, and cloudy  form being tired   Pt reported use of coping skills including successful follow through and barriers to follow through: talked with sister, did breathing at bed time  Pt reported significant or positive event/step: spoke with sister, cleaning out things at home we don't need   Pt identified goal for the treatment day: Get closure from last day in group   Pt treatment plan areas addressed:  Depression, Anxiety, Social Isolation, Employment/vocational stress, Self Care, Parenting Stress, Trauma, Sleep Disturbance, Coping Skills, Mindfulness, CBT skills, Building strengths/resilience and Self-compassion  Pt requested consult with: None  Visit Diagnosis:      ICD-10-CM ICD-9-CM   1. MDD (major depressive disorder), recurrent, in partial remission (CMS/HCC)  F33.41 296.35   2. NORMA (generalized anxiety disorder)  F41.1 300.02   3. Bulimia nervosa  F50.2 307.51   4. Alcohol use disorder, mild, in early remission  F10.11 305.03         Assessment/Observations:  Pt wrote that she took Tylenol PM last night. Pt shared that she had a hard time falling asleep but used box breathing to help quiet her thoughts. Pt shared that she engaged in self-care and would like to engage in groups today as it is her last day. Pt shared that she had a good conversation with her sister who is a god support for her.   Plan:  Continue with PHP   Pt to F/U with treatment goals as outlined in treatment plan.  Pt to F/U with local ED/call 911 should SI/HI arises.    Cheryl Mcbride, LPC

## 2022-06-13 NOTE — GROUP NOTE
Date:  6/13/2022  Start Time:  10:40 AM  End Time:  11:40 AM  Chel Price, YOB: 1975   9 members attended group today.    Group Focus:  Goal of group was to establish and build social support, review coping skills, normalize the struggles of being human, and increase self awareness and self compassion.    About the Group:  Clinician started group with a prompting quote/song to facilitate group discussion.  Group engaged in active and supportive discussion. Topics discussed included relationships, impact that mental illness has on family members, setting boundaries as needed, asking for help and receiving help.  Group members were able to offer insightful and supportive feedback and suggestions about how to manage difficult situations.  Group ended with a meditation/song.  Tiara Melvin MD was onsite when services were rendered.        Patient  was an active group participant.  Assessment/observation of group participation/presentation: Pt was supportive of peers and able to relate with group discussion. Pt provided helpful feedback at times based on her own experiences. Pt is a positive group member and has insight that she shares with peers. Pt was attentive and displayed verbal as well as non verbal cues that she was engaged in group discussion.   Treatment plan areas addressed: Depression, Anxiety, Self Care, Parenting Stress, Trauma, Sleep Disturbance, Coping Skills, Mindfulness, CBT skills, Building strengths/resilience and Self-compassion  Visit Diagnosis:      ICD-10-CM ICD-9-CM   1. MDD (major depressive disorder), recurrent, in partial remission (CMS/HCC)  F33.41 296.35   2. NORMA (generalized anxiety disorder)  F41.1 300.02   3. Bulimia nervosa  F50.2 307.51   4. Alcohol use disorder, mild, in early remission  F10.11 305.03       Plan: Continue with PHP   Pt to F/U with treatment goals as outlined in treatment plan.  Pt to F/U with local ED/call 911 should SI/HI arises.    Cheryl Mcbride,  LPC

## 2022-07-11 ENCOUNTER — TELEPHONE (OUTPATIENT)
Dept: PSYCHIATRY | Facility: HOSPITAL | Age: 47
End: 2022-07-11
Payer: COMMERCIAL

## 2022-07-11 NOTE — TELEPHONE ENCOUNTER
Pt sent STD paperwork to [facility] staff and asked that STD paperwork be completed and sent back to [employer]. LPC completed paperwork for dates of tx that pt was in PHP tx at [facility], 5/27/22-6/13/22. LPC sent completed paperwork back to [employer].

## 2022-07-18 ENCOUNTER — TELEMEDICINE (OUTPATIENT)
Dept: PSYCHIATRY | Facility: HOSPITAL | Age: 47
End: 2022-07-18
Attending: SOCIAL WORKER
Payer: COMMERCIAL

## 2022-07-18 DIAGNOSIS — F41.1 GAD (GENERALIZED ANXIETY DISORDER): ICD-10-CM

## 2022-07-18 DIAGNOSIS — F33.41 MDD (MAJOR DEPRESSIVE DISORDER), RECURRENT, IN PARTIAL REMISSION (CMS/HCC): Primary | ICD-10-CM

## 2022-07-18 DIAGNOSIS — F10.11 ALCOHOL USE DISORDER, MILD, IN EARLY REMISSION: ICD-10-CM

## 2022-07-18 PROCEDURE — 90791 PSYCH DIAGNOSTIC EVALUATION: CPT | Mod: 95 | Performed by: SOCIAL WORKER

## 2022-07-19 ASSESSMENT — COGNITIVE AND FUNCTIONAL STATUS - GENERAL
APPETITE: NO CHANGE
THOUGHT_PROCESS: WNL
INSIGHT: IMPAIRED, MINIMALLY
MOOD: EUTHYMIC (NORMAL);ANXIOUS;MOTIVATED
PERCEPTUAL FUNCTION: NORMAL
IMPULSE CONTROL: INTACT
AFFECT: FULL RANGE
EST. PREMORBID INTELLIGENCE: ABOVE AVERAGE
CONCENTRATION: WNL
RECENT MEMORY: WNL
ATTENTION: WNL
AROUSAL LEVEL: ATTENTIVE
PSYCHOMOTOR FUNCTIONING: WNL
DELUSIONS: NONE OR AGE APPROPRIATE
ORIENTATION: FULLY ORIENTED
REMOTE MEMORY: WNL
SLEEP_WAKE_CYCLE: ONSET PROBLEM
THOUGHT_CONTENT: APPROPRIATE
SPEECH: REGULAR;VERBOSE
EYE_CONTACT: WNL
LIBIDO: NON-CONTRIBUTORY
APPEARANCE: WELL GROOMED
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
## 2022-07-22 ENCOUNTER — TELEMEDICINE (OUTPATIENT)
Dept: PSYCHIATRY | Facility: HOSPITAL | Age: 47
End: 2022-07-22
Attending: COUNSELOR
Payer: COMMERCIAL

## 2022-07-22 DIAGNOSIS — F50.20 BULIMIA NERVOSA: ICD-10-CM

## 2022-07-22 DIAGNOSIS — F33.41 MDD (MAJOR DEPRESSIVE DISORDER), RECURRENT, IN PARTIAL REMISSION (CMS/HCC): Primary | ICD-10-CM

## 2022-07-22 DIAGNOSIS — F10.11 ALCOHOL USE DISORDER, MILD, IN EARLY REMISSION: ICD-10-CM

## 2022-07-22 DIAGNOSIS — F41.1 GAD (GENERALIZED ANXIETY DISORDER): ICD-10-CM

## 2022-07-22 PROCEDURE — S9480 INTENSIVE OUTPATIENT PSYCHIA: HCPCS | Mod: 95 | Performed by: COUNSELOR

## 2022-07-22 ASSESSMENT — COGNITIVE AND FUNCTIONAL STATUS - GENERAL
SLEEP_WAKE_CYCLE: ONSET PROBLEM;DECREASED
PSYCHOMOTOR FUNCTIONING: WNL
APPETITE: NO CHANGE
MOOD: EUTHYMIC (NORMAL);ANXIOUS;MOTIVATED
SPEECH: REGULAR;VERBOSE
AFFECT: FULL RANGE
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
## 2022-07-25 ENCOUNTER — TELEPHONE (OUTPATIENT)
Dept: PSYCHIATRY | Facility: HOSPITAL | Age: 47
End: 2022-07-25
Payer: COMMERCIAL

## 2022-07-25 NOTE — TELEPHONE ENCOUNTER
Pt called [facility]  and shared her son is home sick today and she is unable to attend IOP today 7/25/22.

## 2022-07-27 ENCOUNTER — TELEMEDICINE (OUTPATIENT)
Dept: PSYCHIATRY | Facility: HOSPITAL | Age: 47
End: 2022-07-27
Attending: COUNSELOR
Payer: COMMERCIAL

## 2022-07-27 DIAGNOSIS — F41.1 GAD (GENERALIZED ANXIETY DISORDER): ICD-10-CM

## 2022-07-27 DIAGNOSIS — F50.20 BULIMIA NERVOSA: ICD-10-CM

## 2022-07-27 DIAGNOSIS — F33.41 MDD (MAJOR DEPRESSIVE DISORDER), RECURRENT, IN PARTIAL REMISSION (CMS/HCC): Primary | ICD-10-CM

## 2022-07-27 DIAGNOSIS — F10.11 ALCOHOL USE DISORDER, MILD, IN EARLY REMISSION: ICD-10-CM

## 2022-07-27 PROCEDURE — S9480 INTENSIVE OUTPATIENT PSYCHIA: HCPCS | Mod: 95 | Performed by: COUNSELOR

## 2022-07-27 ASSESSMENT — COGNITIVE AND FUNCTIONAL STATUS - GENERAL
SPEECH: REGULAR
SLEEP_WAKE_CYCLE: ONSET PROBLEM;DECREASED
AFFECT: FULL RANGE
MOOD: EUTHYMIC (NORMAL);ANXIOUS;DEPRESSED
APPETITE: NO CHANGE
PSYCHOMOTOR FUNCTIONING: WNL

## 2022-07-27 NOTE — GROUP NOTE
Date:  7/27/2022  Start Time:   9:00 AM  End Time:  12:00 PM    Chel Price, YOB: 1975,  was an active group participant.    Number of Attendees in Group:  8    IOP Session 6:  Topic of curriculum was “Conflict Resolution and Communication.”  Facilitated check in utilizing “Duyen, Thorn and Seed” format and 0-5 safety scale regarding SI.  Provided handouts including: “Practices of a Good Listener” to help facilitate pts being good supports for those they care about in their lives; “Assertive Communications” which describes assertive, aggressive, and passive communication styles including verbal and non verbal behaviors associated with the styles, it also summarizes six behaviors that support assertive communication including the use of I statements and metacommunication; “Avoiding the Four Horsemen in Relationships” which provides information on Wilver Garcia’s work on communication habits that can lead to relationship demise; “Passive vs. Aggressive Communication” which provides a check list to help a pt identify their communication style if they are uncertain; “Pause, Relax, and Open” and “Mindfulness in Communication” which encourages pts to slow down in their reactions to become aware of other options and to accept what is without judging it; “Mindful Communication” and “I Statements” which encourages pts to practice making I statements from more aggressive statements; “Relationship Conflict Resolution” which offers suggestions as to how to effectively resolve disagreements such as taking a time out; “Generational Differences” which promotes understanding of the experience of generational members;  “Radical Acceptance” by Dr. Julissa Jones which summarizes the skill as a problem solving option;  and several “Interpersonal Effectiveness Handouts” in the DBT modality.      Request for Consent  Patient provided verbal consent to treat via telemedicine. Clinician introduced the secure telemedicine platform  "that we are utilizing to provide care during the COVID-19 pandemic. Patient understands the session will be billed to their insurance or patient directly.  Patient was informed only the group patients  and the clinician are permitted on?the video conference, sessions are not recorded by the clinician, and the patient is not permitted to record the session.? Patient was provided clinician's unique meeting ID prior to session, patient was asked to arrive to virtual session on time just as patient would if we were in the office. Clinician confirmed identification of patient by name and birthdate, provider name, location of patient and clinician, and callback number in case disconnected. Patient consents to behavioral health treatment    Patient Response to Request for Consent: Yes  Visit Type performed: Audio and Video        Patients reaction:  Pt shared her increased anxiety after having a tough morning with her son.  Son has ADHD and recent dx of ASD.  Pt reported awareness of current mom guilt.  Pt received support from peers and encouragement to practice self compassion.  Pt shared her мария is 10 week old kitten - good reminder to not take life so seriously.  \"My thorn is feeling so tired.  I need to get more sleep.\"  Pt shared her seed is on self care.  She will go to chiropracter apt. And go for a swim.      Mental Status:  Mood: Euthymic (normal), Anxious, Depressed  Affect: Full Range  Speech: Regular  Psychomotor Functioning: WNL  Anxiety Level: 3  Depression Level: 2  Anger Level: 1  Safety Scale: 0  Safety Contract:: N/A  Behaviors:  ADL'S:: Good  Social Status:: Minimal  Sleeping: Onset Problem, Decreased (groggy)  Appetite: No Change  Med Compliance: Yes  Substance Use: Denies  Self Injury:: None    Visit Diagnosis:     ICD-10-CM ICD-9-CM   1. MDD (major depressive disorder), recurrent, in partial remission (CMS/[facility])  F33.41 296.35   2. NORMA (generalized anxiety disorder)  F41.1 300.02   3. Alcohol use " disorder, mild, in early remission  F10.11 305.03   4. Bulimia nervosa  F50.2 307.51       Plan: Continue IOP to : Grief and loss  Pt to F/U with treatment goals as outlined in treatment plan.  Pt to F/U with local ED/call 911 should SI/HI arises.    Natali Tolentino, LPC

## 2022-07-29 ENCOUNTER — TELEMEDICINE (OUTPATIENT)
Dept: PSYCHIATRY | Facility: HOSPITAL | Age: 47
End: 2022-07-29
Attending: COUNSELOR
Payer: COMMERCIAL

## 2022-07-29 DIAGNOSIS — F50.20 BULIMIA NERVOSA: ICD-10-CM

## 2022-07-29 DIAGNOSIS — F10.11 ALCOHOL USE DISORDER, MILD, IN EARLY REMISSION: ICD-10-CM

## 2022-07-29 DIAGNOSIS — F33.41 MDD (MAJOR DEPRESSIVE DISORDER), RECURRENT, IN PARTIAL REMISSION (CMS/HCC): Primary | ICD-10-CM

## 2022-07-29 DIAGNOSIS — F41.1 GAD (GENERALIZED ANXIETY DISORDER): ICD-10-CM

## 2022-07-29 PROCEDURE — S9480 INTENSIVE OUTPATIENT PSYCHIA: HCPCS | Mod: 95 | Performed by: COUNSELOR

## 2022-07-29 ASSESSMENT — COGNITIVE AND FUNCTIONAL STATUS - GENERAL
AFFECT: FULL RANGE
SPEECH: REGULAR
MOOD: ANXIOUS;DEPRESSED;HOPEFUL
PSYCHOMOTOR FUNCTIONING: WNL
APPETITE: NO CHANGE
SLEEP_WAKE_CYCLE: NO CHANGE

## 2022-07-29 NOTE — GROUP NOTE
Date:  7/29/2022  Start Time:   9:00 AM  End Time:  12:00 PM    Chel Price, YOB: 1975,  was an active group participant.    Number of Attendees in Group:  7    [facility] Session 7:  Topic of curriculum was “Grief and Loss.”  Facilitated check in utilizing “Duyen, Thorn and Seed” format and 0-5 safety scale regarding SI.  Provided handouts including: “Grief Quotes, Bereavement Quotes” which provides several quotes to assist pts in describing and verbalizing their experiences; “Welcome to [place]” by Adelaide Pritchard; “What is Grief and Loss?” which summarizes the experience of grief and loss as being both physical and emotional challenges common misconceptions about grief; “Healing Your Grieving Body: Physical Practices for Mourners” by Nahid Wagoner, Ph.D. which encourages bodily self care during the grieving process; “The Longest Goodbye” by Rajiv Oleary which explores how yoga can be supportive and healing during grieving; “Healing After Loss: Meditation For Grieving” by Tremaine Swenson; “Loss and Grief - Activities to Help You Grieve” which suggests 20 coping skills to assist in processing grief and engaging in self care; “See the Glass as Already Broken (and Everything Else Too)” from “Don’t Sweat the Small Stuff” by Rodri Naylor this encourages pts to accept change and loss as part of life and to increase presence and appreciation for right now; an excerpt from “The Missing Piece” by Tamie Kinsey which encourages reflection of what it means to be whole even if we are missing a piece; “There’s Purpose in Pain and a Gift in Every Loss” by Magdalena Epperson which shares the author’s journey to find meaning in her loss;  “Feeling Supported” which describes visualizing the support and comfort of loved ones as a coping skill to assist when distressed; “Grief Sentence Completion” to assist pts in processing their grief through writing and journaling;  a definition of gratitude; “Five Myths about Gratitude”  "by Schuyler Rivera which corrects misunderstandings of what is gratitude and how it impacts our lives; “6 Ways Gratitude Can Improve Your Life and Make You Happier” by Arleen Shea which describes additional benefits of a gratitude practice including improved relationships and improved sleep;  and “Gratitude Journal” and “Four Great Gratitude Strategies” by Yuliet Jordan which summarizes the research on the benefits of gratitude and provides strategies to build a gratitude practice.      Request for Consent  Patient provided verbal consent to treat via telemedicine. Clinician introduced the secure telemedicine platform that we are utilizing to provide care during the COVID-19 pandemic. Patient understands the session will be billed to their insurance or patient directly.  Patient was informed only the group patients  and the clinician are permitted on?the video conference, sessions are not recorded by the clinician, and the patient is not permitted to record the session.? Patient was provided clinician's unique meeting ID prior to session, patient was asked to arrive to virtual session on time just as patient would if we were in the office. Clinician confirmed identification of patient by name and birthdate, provider name, location of patient and clinician, and callback number in case disconnected. Patient consents to behavioral health treatment    Patient Response to Request for Consent: Yes  Visit Type performed: Audio and Video        Patients reaction:  Pt shared that she has noticed she is craving alcohol.  Pt is aware this is a sign that she is not doing other things to take care of herself.  Pt has also isolated from friends, but plans on engaging in self care this weekend.  Pt seems to be be very hard on herself and uses \"shoulds\" often.  She was encouraged to be more gentle with herself by a peer.  Pt shared her Duyen is birds tweeting outside.  Her thorn is grief discussion - she recently lost her " "beloved cat and feels she is still grieving this loss. Pt shared her seed, \"I am going to eat a vegetable.\"  Pt acknowledges this is a part of self care she has been lacking in.      Mental Status:  Mood: Anxious, Depressed, Hopeful  Affect: Full Range  Speech: Regular  Psychomotor Functioning: WNL  Anxiety Level: 4  Depression Level: 3  Anger Level: 0  Safety Scale: 0  Safety Contract:: N/A  Behaviors:  ADL'S:: Good  Social Status:: Isolating from family/friends  Sleeping: No Change  Appetite: No Change  Med Compliance: Yes  Substance Use: Denies  Self Injury:: None    Visit Diagnosis:     ICD-10-CM ICD-9-CM   1. MDD (major depressive disorder), recurrent, in partial remission (CMS/HCC)  F33.41 296.35   2. NORMA (generalized anxiety disorder)  F41.1 300.02   3. Alcohol use disorder, mild, in early remission  F10.11 305.03   4. Bulimia nervosa  F50.2 307.51       Plan: Continue IOP to : Emotional Regulation  Pt to F/U with treatment goals as outlined in treatment plan.  Pt to F/U with local ED/call 911 should SI/HI arises.    Natali Tolentino, LPC        "

## 2022-08-01 ENCOUNTER — TELEPHONE (OUTPATIENT)
Dept: PSYCHIATRY | Facility: HOSPITAL | Age: 47
End: 2022-08-01
Payer: COMMERCIAL
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
## 2022-08-03 ENCOUNTER — TELEMEDICINE (OUTPATIENT)
Dept: PSYCHIATRY | Facility: HOSPITAL | Age: 47
End: 2022-08-03
Attending: COUNSELOR
Payer: COMMERCIAL

## 2022-08-03 ENCOUNTER — TELEPHONE (OUTPATIENT)
Dept: PSYCHIATRY | Facility: HOSPITAL | Age: 47
End: 2022-08-03
Payer: COMMERCIAL

## 2022-08-03 DIAGNOSIS — F41.1 GAD (GENERALIZED ANXIETY DISORDER): ICD-10-CM

## 2022-08-03 DIAGNOSIS — F33.41 MDD (MAJOR DEPRESSIVE DISORDER), RECURRENT, IN PARTIAL REMISSION (CMS/HCC): Primary | ICD-10-CM

## 2022-08-03 DIAGNOSIS — F10.11 ALCOHOL USE DISORDER, MILD, IN EARLY REMISSION: ICD-10-CM

## 2022-08-03 DIAGNOSIS — F50.20 BULIMIA NERVOSA: ICD-10-CM

## 2022-08-03 PROCEDURE — S9480 INTENSIVE OUTPATIENT PSYCHIA: HCPCS | Mod: 95 | Performed by: COUNSELOR

## 2022-08-03 ASSESSMENT — COGNITIVE AND FUNCTIONAL STATUS - GENERAL
MOOD: ANXIOUS;HOPEFUL
SPEECH: REGULAR
APPETITE: NO CHANGE
PSYCHOMOTOR FUNCTIONING: WNL
AFFECT: FULL RANGE
SLEEP_WAKE_CYCLE: DECREASED

## 2022-08-03 NOTE — TELEPHONE ENCOUNTER
CHRISTOPHER called OP to update on IOP admit: Dr. Garza [phone] 7 Psych & Abbie Tejada [phone] gave message to reception w/ my email.     Abbie happy to hear that pt is attending IOP b/c has few issues still needing to address.  Added Abilify unsure if taking. Last seen on 6/22/22 and should have been seen end of July. Not taking full dose of Antabuse as Dr. Burris advise and ask if we offer SA services in IOP, CHRISTOPHER said not part of [facility].  Abbie is not writing out of work.  Abbie needs pt to call for apt.      LVM for pt about DORIS.

## 2022-08-03 NOTE — GROUP NOTE
Date:  8/3/2022  Start Time:   9:00 AM  End Time:  12:00 PM    Chel Price, YOB: 1975,  was an active group participant.    Number of Attendees in Group:  5    IOP Session 9:  Topic of curriculum was “Healthy Relationships and Boundaries.”  Facilitated check in utilizing “Duyen, Thorn and Seed” format and 0-5 safety scale regarding SI.  Provided handouts including: “Power and Control” wheel which provides information on the types of abuse that can occur; “Bedias Wheel” which describes the aspects of healthy relationships; “How Abuse Might Affect Your Mental Health” which describes typical reactions to abuse and options to obtain support and assistance; “The Facts on Reproductive Health and Partner Abuse” which provides statistics on the occurrence of abuse in relationships including sexual and reproduction coercion; “Am I Being Abused?” checklist which provides pts with questions to answer to determine if their experiences are in fact abuse; “Why People Stay in Abusive Relationships” which provides information on reasons people stay in abusive relationships followed by information that challenges the reasons people may choose to stay in relationships; “21 Ways to ‘Give Good No’” by Cammy Root which provides pts with 21 phrases to use in setting boundaries and limits; “[place] Drawings” which provides information on rigid, weak, and healthy boundaries and encourages pts to draw out their current boundaries to determine where they can make changes; “3 Ways to Set Boundaries” by Vianey Guardado which describes using the phrase” discomfort over resentment” to soothe ourselves when we are setting boundaries; and handouts on interpersonal effectiveness to build pts’ skills in managing relationships; and “5 Myths About Setting Boundaries That Steal Your Viki and Lead to Resentment” by Dahlia Crow which explores barriers people often hold as to why they cannot set boundaries; “Cycle of Violence “ which  "explores victim response; “The Sound Relationship House” by Radha which explains the need to build a fundamental process; “What Is Your True Color self- activity” which highlights strongest temperament tendencies.       Request for Consent  Patient provided verbal consent to treat via telemedicine. Clinician introduced the secure telemedicine platform that we are utilizing to provide care during the COVID-19 pandemic. Patient understands the session will be billed to their insurance or patient directly.  Patient was informed only the group patients  and the clinician are permitted on?the video conference, sessions are not recorded by the clinician, and the patient is not permitted to record the session.? Patient was provided clinician's unique meeting ID prior to session, patient was asked to arrive to virtual session on time just as patient would if we were in the office. Clinician confirmed identification of patient by name and birthdate, provider name, location of patient and clinician, and callback number in case disconnected. Patient consents to behavioral health treatment    Patient Response to Request for Consent: Yes  Visit Type performed: Audio and Video       Patients reaction:  Pt shared that she has not had alcohol for 3-4 mos but notices recently a wish to have a glass of wine.  Pt shows insight to how this is not a healthy coping skill for her and she is doing some reflection work around why she wants wine.  \"When I can't exercise I want to drink more to help me sleep.\"   Pt was actively engaged in group today sharing her feedback and support for peers.  Peers also offered support and resonated with her struggle around alcohol.  Pt also expressed her love of watering her plants - its medative for her.  She is looking forward to going to see her son's performance for camp.  \"My thorn is the cat got teeth.\"  Pt expressed mixed feelings about adopting her.      Mental Status:  Mood: Anxious, " Hopeful  Affect: Full Range  Speech: Regular  Psychomotor Functioning: WNL  Anxiety Level: 3  Depression Level: 1  Anger Level: 0  Safety Scale: 0  Safety Contract:: N/A  Behaviors:  ADL'S:: Good  Social Status:: Minimal  Sleeping: Decreased (in pain, struggling with pain)  Appetite: No Change  Med Compliance: Yes  Substance Use: Denies  Self Injury:: None    Visit Diagnosis:     ICD-10-CM ICD-9-CM   1. MDD (major depressive disorder), recurrent, in partial remission (CMS/HCC)  F33.41 296.35   2. NORMA (generalized anxiety disorder)  F41.1 300.02   3. Alcohol use disorder, mild, in early remission  F10.11 305.03   4. Bulimia nervosa  F50.2 307.51       Plan: Continue IOP to : Attachment  Pt to F/U with treatment goals as outlined in treatment plan.  Pt to F/U with local ED/call 911 should SI/HI arises.    Natali Tolentino, LPC

## 2022-08-04 ENCOUNTER — TELEPHONE (OUTPATIENT)
Dept: PSYCHIATRY | Facility: HOSPITAL | Age: 47
End: 2022-08-04
Payer: COMMERCIAL

## 2022-08-04 NOTE — TELEPHONE ENCOUNTER
Chel return my call.  Pt file extension but needs documentation sent to [employer] vendor. Her RTW date 8/29 but approved through 6/13. SW will type up letter and send in since pt unsure what forms needed.  Pt asked about virtual care when outside state, SW explain it is insurance and [facility] policy to be in [place] for virtual care. She was offered few nights at beach next week in [place] and will have to miss Wed, referred to [place].  Letter sent via Fax and request for records sent to HIM.

## 2022-08-05 ENCOUNTER — TELEMEDICINE (OUTPATIENT)
Dept: PSYCHIATRY | Facility: HOSPITAL | Age: 47
End: 2022-08-05
Attending: COUNSELOR
Payer: COMMERCIAL

## 2022-08-05 DIAGNOSIS — F50.20 BULIMIA NERVOSA: ICD-10-CM

## 2022-08-05 DIAGNOSIS — F10.11 ALCOHOL USE DISORDER, MILD, IN EARLY REMISSION: ICD-10-CM

## 2022-08-05 DIAGNOSIS — F41.1 GAD (GENERALIZED ANXIETY DISORDER): ICD-10-CM

## 2022-08-05 DIAGNOSIS — F33.41 MDD (MAJOR DEPRESSIVE DISORDER), RECURRENT, IN PARTIAL REMISSION (CMS/HCC): Primary | ICD-10-CM

## 2022-08-05 PROCEDURE — S9480 INTENSIVE OUTPATIENT PSYCHIA: HCPCS | Mod: 95 | Performed by: COUNSELOR

## 2022-08-05 ASSESSMENT — COGNITIVE AND FUNCTIONAL STATUS - GENERAL
SLEEP_WAKE_CYCLE: DECREASED
APPETITE: NO CHANGE
SPEECH: REGULAR
PSYCHOMOTOR FUNCTIONING: WNL
AFFECT: FULL RANGE
MOOD: ANXIOUS;HOPEFUL

## 2022-08-05 NOTE — GROUP NOTE
Date:  8/5/2022  Start Time:   9:00 AM  End Time:  12:00 PM    Chel Price, YOB: 1975,  was an active group participant.    Number of Attendees in Group:  5    IOP Session 10: Topic of curriculum was “Attachment Style and Caregiving.”  Facilitated check in utilizing “Duyen, Thorn and Seed” format and 0-5 safety scale regarding SI.  Provided handouts including: the lyrics to the song “Grateful” by Charlotte Ibrahim; “How to Avoid the Empathy Trap” by Madhu Sierra and Fernanda Humphries which explains the need for empathy in relationships, but also presents strategies for pts to become more aware of their empathy traps and how to find a better balance in their relationships; “How Does Your Attachment Style Impact Your Adult Relationships?” by Kourtney Martinez which presents a brief description of attachment styles and how they may be playing out in our current relationships; “Change Your Attachment Style to Have a Better Life” which further explores attachment styles and how we can adjust and change our attachment styles; a handout with tips and strategies on behavioral changes to start to build secure attachments; and “How to Care for the Caregiver” by Trina De La Cruz which summarizes the need for caregivers to prioritize their own well being in order to be present in their caregiving role, it also presents suggestions on how to take care of ourselves; “stress test/ self-care for helpers”; video by Donna Nelson which asks us to consider, “How would our lives and relationships and our world transform if we stopped being so afraid of pain? What if we just once and for all decided we were strong enough for the pain in our lives and instead of hiding from it, we rushed straight towards it and allowed our pain to become our power?”; “5 Love Languages Test” which provides better understanding of inherent love languages and “Guided Meditation; Lovingkindness: Seeing Past the Mask”.     Request for Consent  Patient provided  verbal consent to treat via telemedicine. Clinician introduced the secure telemedicine platform that we are utilizing to provide care during the COVID-19 pandemic. Patient understands the session will be billed to their insurance or patient directly.  Patient was informed only the group patients  and the clinician are permitted on?the video conference, sessions are not recorded by the clinician, and the patient is not permitted to record the session.? Patient was provided clinician's unique meeting ID prior to session, patient was asked to arrive to virtual session on time just as patient would if we were in the office. Clinician confirmed identification of patient by name and birthdate, provider name, location of patient and clinician, and callback number in case disconnected. Patient consents to behavioral health treatment    Patient Response to Request for Consent: Yes  Visit Type performed: Audio and Video        Patients reaction:  Pt identified feeling extreme frustration with her son over the past few days, but is working on frustration tolerance as well as self compassion.  Pt also reported newfound awareness that financial stress has played a big role in her recent depressive episode.  She feels relief after paying off her debt last week.  Pt received feedback from group on how to set a boundary with her mother.  Her мария is that a colleague has been in touch with her and gave her really positive thoughts and support.    Mental Status:  Mood: Anxious, Hopeful  Affect: Full Range  Speech: Regular  Psychomotor Functioning: WNL  Anxiety Level: 3  Depression Level: 1  Anger Level: 0  Safety Scale: 0  Safety Contract:: N/A  Behaviors:  ADL'S:: Good  Social Status:: Normal  Sleeping: Decreased  Appetite: No Change  Med Compliance: Yes  Substance Use: Denies  Self Injury:: None    Visit Diagnosis:     ICD-10-CM ICD-9-CM   1. MDD (major depressive disorder), recurrent, in partial remission (CMS/[facility])  F33.41 296.35    2. NORMA (generalized anxiety disorder)  F41.1 300.02   3. Alcohol use disorder, mild, in early remission  F10.11 305.03   4. Bulimia nervosa  F50.2 307.51       Plan: Continue IOP to : Self Talk  Pt to F/U with treatment goals as outlined in treatment plan.  Pt to F/U with local ED/call 911 should SI/HI arises.    Natali Tolentino, LPC

## 2022-08-08 ENCOUNTER — TELEMEDICINE (OUTPATIENT)
Dept: PSYCHIATRY | Facility: HOSPITAL | Age: 47
End: 2022-08-08
Attending: COUNSELOR
Payer: COMMERCIAL

## 2022-08-08 DIAGNOSIS — F10.11 ALCOHOL USE DISORDER, MILD, IN EARLY REMISSION: ICD-10-CM

## 2022-08-08 DIAGNOSIS — F33.41 MDD (MAJOR DEPRESSIVE DISORDER), RECURRENT, IN PARTIAL REMISSION (CMS/HCC): Primary | ICD-10-CM

## 2022-08-08 DIAGNOSIS — F41.1 GAD (GENERALIZED ANXIETY DISORDER): ICD-10-CM

## 2022-08-08 DIAGNOSIS — F50.20 BULIMIA NERVOSA: ICD-10-CM

## 2022-08-08 PROCEDURE — S9480 INTENSIVE OUTPATIENT PSYCHIA: HCPCS | Mod: 95 | Performed by: COUNSELOR

## 2022-08-08 ASSESSMENT — COGNITIVE AND FUNCTIONAL STATUS - GENERAL
SLEEP_WAKE_CYCLE: DECREASED
APPETITE: NO CHANGE
AFFECT: FULL RANGE
MOOD: ANXIOUS;HOPEFUL;EUTHYMIC (NORMAL)
SPEECH: REGULAR
PSYCHOMOTOR FUNCTIONING: WNL

## 2022-08-08 NOTE — GROUP NOTE
Date:  8/8/2022  Start Time:   9:00 AM  End Time:  12:00 PM    Chel Price, YOB: 1975,  was an active group participant.    Number of Attendees in Group:      [facility] Session 11: Topic of curriculum was “Self Talk.”  Facilitated check in utilizing “Duyen, Thorn and Seed” format and 0-5 safety scale regarding SI.  Provided handouts including: “Cut Yourself Some Slack” from “Don’t Sweat the Small Stuff” by Rodri Naylor which encouraged pts to be kind to themselves as they recover and to not expect “perfection” as they are learning new skills; “Automatic Thoughts of Currently Depressed People” which summarizes the commonality of highly negative and critical self talk; “What is Self Talk?” by Georgie Mansfield which provides an example of how the stories we tell ourselves may not be accurate; “Surprising Ways to Quiet Your Inner Critic” by Selma Sánchez which encouraged pts to consider what need may be underneath their negative self talk; “Using Coping Thoughts” which provides examples of distressing situations and how we can shift our thinking to promote coping as opposed to distress; “Using Self Encouraging Coping Thoughts” which encourages pts to identify coping thoughts they can utilize to soothe themselves in difficult situations; “Big Picture Evidence Log” which encourages pts to take a step back and look at the evidence for their thoughts to see if they are accurate and fair; “[address] [place], Supportive Things to Tell Yourself Today” by Michelle Cortez which describes the author’s journey of recognizing her self talk as an internalized version of a negative authority figure from her childhood and how she shifted the voice in her head; “Self Compassion Break” which coaches pts on how to allow themselves 5 minutes to connect with themselves and be mindful, compassionate, and aware of our shared humanity; “Use Your Strengths” which describes a way for pts to identify a strength and how they would like to use  "that strength that day; and “Self Compassionate Letter” which describes for pts a coping skill about imagining a loved one describing a part of you that you are ashamed of;  Heart Assisted 2 minute stress relief, emotional freedom technique and tapping, “hand over heart” by Madhu Reno, and “Dialogue With the Inner Critic” by Tiara Rajput which provide techniques to shift the relationships with the inner critic.     Request for Consent  Patient provided verbal consent to treat via telemedicine. Clinician introduced the secure telemedicine platform that we are utilizing to provide care during the COVID-19 pandemic. Patient understands the session will be billed to their insurance or patient directly.  Patient was informed only the group patients  and the clinician are permitted on?the video conference, sessions are not recorded by the clinician, and the patient is not permitted to record the session.? Patient was provided clinician's unique meeting ID prior to session, patient was asked to arrive to virtual session on time just as patient would if we were in the office. Clinician confirmed identification of patient by name and birthdate, provider name, location of patient and clinician, and callback number in case disconnected. Patient consents to behavioral health treatment    Patient Response to Request for Consent: Yes  Visit Type performed: Audio and Video       Patients reaction:  Pt vulnerably shared her self compassion letter with the group.  Also shared her experience with the work by jonnie pelayo.  Pt noted this is helpful for her in challenging negative self talk.   \"My мария is feeling proud of my son.\"  Pt shared her thorn is needing to practice better sleep hygiene.  She ripped up carpet at 10pm last night.  She showed some insight to the impulsivity of this decision.  Pt's seed will be go to bed when son goes to bed tonight.      Mental Status:  Mood: Anxious, Hopeful, Euthymic (normal)  Affect: Full " Range  Speech: Regular  Psychomotor Functioning: WNL  Anxiety Level: 2  Depression Level: 1  Anger Level: 0  Safety Scale: 0  Safety Contract:: N/A  Behaviors:  ADL'S:: Fair  Social Status:: Normal  Sleeping: Decreased (anxious)  Appetite: No Change  Med Compliance: Yes  Substance Use: Denies  Self Injury:: None    Visit Diagnosis:     ICD-10-CM ICD-9-CM   1. MDD (major depressive disorder), recurrent, in partial remission (CMS/HCC)  F33.41 296.35   2. NORMA (generalized anxiety disorder)  F41.1 300.02   3. Alcohol use disorder, mild, in early remission  F10.11 305.03   4. Bulimia nervosa  F50.2 307.51       Plan: Continue IOP to : Core Beliefs  Pt to F/U with treatment goals as outlined in treatment plan.  Pt to F/U with local ED/call 911 should SI/HI arises.    Notes for Treatment day: Pt shared she will not be able to attend group on Wednesday due to being out of state with her son.    Natail Tolentino, LPC

## 2022-08-12 ENCOUNTER — TELEMEDICINE (OUTPATIENT)
Dept: PSYCHIATRY | Facility: HOSPITAL | Age: 47
End: 2022-08-12
Attending: COUNSELOR
Payer: COMMERCIAL

## 2022-08-12 DIAGNOSIS — F41.1 GAD (GENERALIZED ANXIETY DISORDER): ICD-10-CM

## 2022-08-12 DIAGNOSIS — F10.11 ALCOHOL USE DISORDER, MILD, IN EARLY REMISSION: ICD-10-CM

## 2022-08-12 DIAGNOSIS — F33.41 MDD (MAJOR DEPRESSIVE DISORDER), RECURRENT, IN PARTIAL REMISSION (CMS/HCC): Primary | ICD-10-CM

## 2022-08-12 DIAGNOSIS — F50.20 BULIMIA NERVOSA: ICD-10-CM

## 2022-08-12 PROCEDURE — S9480 INTENSIVE OUTPATIENT PSYCHIA: HCPCS | Mod: 95 | Performed by: COUNSELOR

## 2022-08-12 ASSESSMENT — COGNITIVE AND FUNCTIONAL STATUS - GENERAL
MOOD: ANXIOUS;HOPEFUL;EUTHYMIC (NORMAL)
SPEECH: REGULAR
SLEEP_WAKE_CYCLE: INCREASED
PSYCHOMOTOR FUNCTIONING: WNL
APPETITE: NO CHANGE
AFFECT: FULL RANGE

## 2022-08-12 NOTE — GROUP NOTE
Date:  8/12/2022  Start Time:   9:00 AM  End Time:  12:00 PM    Chel Price, YOB: 1975,  was an active group participant.    Number of Attendees in Group:  8    Session 13:  Topic of curriculum was “Cognitive Distortions, Defense Mechanisms, and Comparing Self to Others”.  Facilitated check in using “Duyen, Thorn and Seed” format and 0-5 safety scale regarding SI.  Provided handouts including: several inspirational quotes; “Cognitive Distortions” and “Types of Thinking Errors” which summarizes and provides examples of cognitive distortions to increase pts’ awareness of maladaptive thought habits; “Disputing Questions” which provides examples of questions to ask oneself to challenge thought distortions; “Challenging Negative Thoughts” which provide examples of additional questions to ask themselves to challenge the validity of their negative thoughts to ultimately reduce depression and anxiety; “Daily Record of Dysfunctional Thoughts” which provides prompts for pts to write and track their negative thoughts; “15 Common Defense Mechanisms” by Wilver Spears which summarizes what defense mechanisms are and provides brief descriptions of 15 commonly used defense mechanisms; “3 Simple Ways to Overcome Negativity Bias” by Ashu Prado also featuring corresponding LANA Talk; “What an Overweight Former Model can Teach us About Breaking Free of Bad Habits” which explores the secondary gains of maladaptive behaviors;  article entitled “Vilma Anniston Calls Out ‘reckless assumptions’ About Why She is Not a Mom” which underscores the tendency of jumping to false conclusions;  “Why It Doesn’t Pay to be a People-Pleaser” by Cammy Root which discusses the stress and anxiety that occurs when we do not live aligned with our integrity and honesty; and “Hands Up: When Did Motherhood Become a Competition?” by Adelita Mandel which provides a reminder that we, especially mothers, are doing as best as we can and to focus on  "our own needs and not compare ourselves.      Request for Consent  Patient provided verbal consent to treat via telemedicine. Clinician introduced the secure telemedicine platform that we are utilizing to provide care during the COVID-19 pandemic. Patient understands the session will be billed to their insurance or patient directly.  Patient was informed only the group patients  and the clinician are permitted on?the video conference, sessions are not recorded by the clinician, and the patient is not permitted to record the session.? Patient was provided clinician's unique meeting ID prior to session, patient was asked to arrive to virtual session on time just as patient would if we were in the office. Clinician confirmed identification of patient by name and birthdate, provider name, location of patient and clinician, and callback number in case disconnected. Patient consents to behavioral health treatment    Patient Response to Request for Consent: Yes  Visit Type performed: Audio and Video       Patients reaction:  Pt shared she is grateful to come back to group after missing on Wednesday.  She also appreciated the meditation on your \"Wiser Self\" noting that it gave her hope.  She shared her Duyen is this group.  Her thorn is son found his birthday gifts.  Pt was visibly frustrated with this development, but was open to suggestions from peers on how to make the best of it and practice Radical Acceptance.  Her seed is to go swimming.      Mental Status:  Mood: Anxious, Hopeful, Euthymic (normal)  Affect: Full Range  Speech: Regular  Psychomotor Functioning: WNL  Anxiety Level: 2  Depression Level: 1  Anger Level: 1  Safety Scale: 0  Safety Contract:: N/A  Behaviors:  ADL'S:: Good  Social Status:: Normal  Sleeping: Increased (anxious)  Appetite: No Change  Med Compliance: Yes  Substance Use: Denies  Self Injury:: None    Visit Diagnosis:     ICD-10-CM ICD-9-CM   1. MDD (major depressive disorder), recurrent, in " partial remission (CMS/HCC)  F33.41 296.35   2. NORMA (generalized anxiety disorder)  F41.1 300.02   3. Alcohol use disorder, mild, in early remission  F10.11 305.03   4. Bulimia nervosa  F50.2 307.51       Plan: Continue IOP to : Trauma and Safety  Pt to F/U with treatment goals as outlined in treatment plan.  Pt to F/U with local ED/call 911 should SI/HI arises.      Natali Tolentino, LPC

## 2022-08-15 ENCOUNTER — TELEMEDICINE (OUTPATIENT)
Dept: PSYCHIATRY | Facility: HOSPITAL | Age: 47
End: 2022-08-15
Attending: COUNSELOR
Payer: COMMERCIAL

## 2022-08-15 DIAGNOSIS — F10.11 ALCOHOL USE DISORDER, MILD, IN EARLY REMISSION: ICD-10-CM

## 2022-08-15 DIAGNOSIS — F41.1 GAD (GENERALIZED ANXIETY DISORDER): ICD-10-CM

## 2022-08-15 DIAGNOSIS — F50.20 BULIMIA NERVOSA: ICD-10-CM

## 2022-08-15 DIAGNOSIS — F33.41 MDD (MAJOR DEPRESSIVE DISORDER), RECURRENT, IN PARTIAL REMISSION (CMS/HCC): Primary | ICD-10-CM

## 2022-08-15 PROCEDURE — S9480 INTENSIVE OUTPATIENT PSYCHIA: HCPCS | Mod: 95 | Performed by: COUNSELOR

## 2022-08-15 ASSESSMENT — COGNITIVE AND FUNCTIONAL STATUS - GENERAL
AFFECT: FULL RANGE;TEARFUL
MOOD: ANXIOUS;DEPRESSED
APPETITE: NO CHANGE
SLEEP_WAKE_CYCLE: NO CHANGE
SPEECH: REGULAR
PSYCHOMOTOR FUNCTIONING: WNL

## 2022-08-15 NOTE — GROUP NOTE
Date:  8/15/2022  Start Time:   9:00 AM  End Time:  12:00 PM    Chel Price, YOB: 1975,  was an active group participant.    Number of Attendees in Group:  6    Session 14: Topic of curriculum was “Trauma and Safety.”  Facilitated check in utilizing “Duyen, Thorn and Seed” format and 0-5 safety scale regarding SI.  Provided handouts including: several inspirational quotes from Lexi Erickson; “Skillful Ways to Deal with Stress and Trauma” by Tiara Rajput which summarizes how out brains and bodies react to stress and trauma and ways to help soothe and calm our bodies; “The Power of Music- Creating Playlists to Heal Grief and Trauma” by Laine Davis introducing experiential technique of music as a coping skill; “Window of Tolerance- Widening the Comfort Zone for Increased Flexibility” that explains importance of developing skills to remain within the Window of Tolerance through use of self-soothing and self-regulating tools;  a blank copy of a safety plan for pt’s use if their skills or support people change or they need an additional copy of a safety plan; an excerpt from “True Refuge” by Shannon Kaplan which provides information on how trauma lives in our bodies and when we are traumatized it is due to the energy of “fight/flight/freeze” not being exhausted from our bodies, additionally, stressed that each person’s perception of trauma may be different based on their unique physiological and psychological makeup; EMDR/PTSD information worksheets, and a copy of EMDR Resource “Container Script”; and “Body Scan Meditation” which describes how pts can practice the body scan to increase inner peace.  Video by Karen Bernal demonstrating EFT tapping also showed to group to increase understanding of how to implement this coping technique.     Request for Consent  Patient provided verbal consent to treat via telemedicine. Clinician introduced the secure telemedicine platform that we are utilizing to provide  care during the COVID-19 pandemic. Patient understands the session will be billed to their insurance or patient directly.  Patient was informed only the group patients  and the clinician are permitted on?the video conference, sessions are not recorded by the clinician, and the patient is not permitted to record the session.? Patient was provided clinician's unique meeting ID prior to session, patient was asked to arrive to virtual session on time just as patient would if we were in the office. Clinician confirmed identification of patient by name and birthdate, provider name, location of patient and clinician, and callback number in case disconnected. Patient consents to behavioral health treatment    Patient Response to Request for Consent: Yes  Visit Type performed: Audio and Video        Patients reaction:  Pt shared openly in group today.  She expressed feeling upset and hurt by her son's behaviors and constant negative verbalizations toward her.  Pt was reassured by peers that she is working hard as a mom, and was encouraged to utilize self compassion.  Pt also noted use of radical acceptance.    Mental Status:  Mood: Anxious, Depressed  Affect: Full Range  Speech: Regular  Psychomotor Functioning: WNL  Anxiety Level: 3  Depression Level: 1  Anger Level: 2  Safety Scale: 0  Safety Contract:: N/A  Behaviors:  ADL'S:: Good  Social Status:: Normal  Sleeping: No Change  Appetite: No Change  Med Compliance: Yes  Substance Use: Denies  Self Injury:: None    Visit Diagnosis:     ICD-10-CM ICD-9-CM   1. MDD (major depressive disorder), recurrent, in partial remission (CMS/HCC)  F33.41 296.35   2. NORAM (generalized anxiety disorder)  F41.1 300.02   3. Alcohol use disorder, mild, in early remission  F10.11 305.03   4. Bulimia nervosa  F50.2 307.51       Plan: Continue IOP to : Goal Setting and Values  Pt to F/U with treatment goals as outlined in treatment plan.  Pt to F/U with local ED/call 911 should SI/HI  arises.    Natali Tolentino, LPC

## 2022-08-17 ENCOUNTER — TELEMEDICINE (OUTPATIENT)
Dept: PSYCHIATRY | Facility: HOSPITAL | Age: 47
End: 2022-08-17
Attending: COUNSELOR
Payer: COMMERCIAL

## 2022-08-17 DIAGNOSIS — F50.20 BULIMIA NERVOSA: ICD-10-CM

## 2022-08-17 DIAGNOSIS — F41.1 GAD (GENERALIZED ANXIETY DISORDER): ICD-10-CM

## 2022-08-17 DIAGNOSIS — F33.41 MDD (MAJOR DEPRESSIVE DISORDER), RECURRENT, IN PARTIAL REMISSION (CMS/HCC): Primary | ICD-10-CM

## 2022-08-17 DIAGNOSIS — F10.11 ALCOHOL USE DISORDER, MILD, IN EARLY REMISSION: ICD-10-CM

## 2022-08-17 PROCEDURE — S9480 INTENSIVE OUTPATIENT PSYCHIA: HCPCS | Mod: 95 | Performed by: COUNSELOR

## 2022-08-17 ASSESSMENT — COGNITIVE AND FUNCTIONAL STATUS - GENERAL
PSYCHOMOTOR FUNCTIONING: WNL
MOOD: EUTHYMIC (NORMAL);HOPEFUL
SLEEP_WAKE_CYCLE: NO CHANGE
APPETITE: DECREASED
SPEECH: REGULAR
AFFECT: FULL RANGE

## 2022-08-17 NOTE — GROUP NOTE
Date:  8/17/2022  Start Time:   9:00 AM  End Time:  12:00 PM    Chel Price, YOB: 1975,  was an active group participant.    Number of Attendees in Group:  5    Session 15: Topic of curriculum was “Values and Goal Setting.” Facilitated check in utilizing “Duyen, Thorn and Seed” format and 0-5 safety scale regarding SI.  Provided handouts including: “Let Go” a poem which explores letting go of some of our unrealistic expectations for ourselves and other people and accepting what is; several handouts exploring values including explanation of what values you and a values checklist to assist pts in identifying and prioritizing what we value and “Exploring Values” which prompts pts to consider where they learned their values; “Setting Life Goals” which prompts pts to consider small, achievable goals for making changes in their lives while recognizing what they are doing well; and “Goal Visualization” which explains how to set ourselves up for success by setting small, achievable goals to promote optimism;  “Five Ways to be Fully Authentic” by Mindful Movement; instructions on how to practice EMDR Resource “Comfortable Place Script”; and “Five Research Based Ways to Say No” by Cammy Root which explores the research behind and provides strategies for saying no to others - the article discusses the “harshness bias” which is our tendency to assume others are judging us harsher than they actually are.  Clinician facilitated discussion around values and had pts identify what they core values are.    Request for Consent  Patient provided verbal consent to treat via telemedicine. Clinician introduced the secure telemedicine platform that we are utilizing to provide care during the COVID-19 pandemic. Patient understands the session will be billed to their insurance or patient directly.  Patient was informed only the group patients  and the clinician are permitted on?the video conference, sessions are not  recorded by the clinician, and the patient is not permitted to record the session.? Patient was provided clinician's unique meeting ID prior to session, patient was asked to arrive to virtual session on time just as patient would if we were in the office. Clinician confirmed identification of patient by name and birthdate, provider name, location of patient and clinician, and callback number in case disconnected. Patient consents to behavioral health treatment    Patient Response to Request for Consent: Yes  Visit Type performed: Audio and Video       Patients reaction:  Pt shared she is feeling less anxiety today and discussed the positive feelings she is having after ordering a new couch, creating a space she loves, and taking up carpet which often brought about negative self talk.  Pt continues to use mindfulness to notice her self talk.  Pt expressed that she values independence but recognizes that its ok that she needs to rely on her village.  Reframe offered by peer that in order to feel independent she needs to rely on her village.  Pt shared small goal of spending more quality time with my son.  Also working on noticing the small things that she is doing well.  Pt will try to do 10 minutes per day of uninterrupted play with her son.    Mental Status:  Mood: Euthymic (normal), Hopeful  Affect: Full Range  Speech: Regular  Psychomotor Functioning: WNL  Anxiety Level: 1  Depression Level: 0  Anger Level: 1  Safety Scale: 0  Safety Contract:: N/A  Behaviors:  ADL'S:: Fair  Social Status:: Normal  Sleeping: No Change  Appetite: Decreased  Med Compliance: Yes  Substance Use: Denies  Self Injury:: None    Visit Diagnosis:     ICD-10-CM ICD-9-CM   1. MDD (major depressive disorder), recurrent, in partial remission (CMS/[facility])  F33.41 296.35   2. NORMA (generalized anxiety disorder)  F41.1 300.02   3. Alcohol use disorder, mild, in early remission  F10.11 305.03   4. Bulimia nervosa  F50.2 307.51       Plan: Continue IOP  to : Discharge Planning  Pt to F/U with treatment goals as outlined in treatment plan.  Pt to F/U with local ED/call 911 should SI/HI arises.    Natali Tolentino, LPC

## 2022-08-19 ENCOUNTER — TELEPHONE (OUTPATIENT)
Dept: PSYCHIATRY | Facility: HOSPITAL | Age: 47
End: 2022-08-19
Payer: COMMERCIAL

## 2022-08-22 ENCOUNTER — TELEMEDICINE (OUTPATIENT)
Dept: PSYCHIATRY | Facility: HOSPITAL | Age: 47
End: 2022-08-22
Attending: COUNSELOR
Payer: COMMERCIAL

## 2022-08-22 DIAGNOSIS — F41.1 GAD (GENERALIZED ANXIETY DISORDER): ICD-10-CM

## 2022-08-22 DIAGNOSIS — F33.41 MDD (MAJOR DEPRESSIVE DISORDER), RECURRENT, IN PARTIAL REMISSION (CMS/HCC): Primary | ICD-10-CM

## 2022-08-22 PROCEDURE — S9480 INTENSIVE OUTPATIENT PSYCHIA: HCPCS | Mod: 95 | Performed by: SOCIAL WORKER

## 2022-08-22 ASSESSMENT — COGNITIVE AND FUNCTIONAL STATUS - GENERAL
APPETITE: INCREASED
MOOD: EUTHYMIC (NORMAL);HOPEFUL
SPEECH: REGULAR
SLEEP_WAKE_CYCLE: DECREASED
AFFECT: FULL RANGE
PSYCHOMOTOR FUNCTIONING: WNL

## 2022-08-22 NOTE — GROUP NOTE
Date:  8/22/2022  Start Time:   9:00 AM  End Time:  12:00 PM    Chel Price, YOB: 1975,  was an active group participant.       Request for Consent  Patient provided verbal consent to treat via telemedicine. Clinician introduced the secure telemedicine platform that we are utilizing to provide care during the COVID-19 pandemic. Patient understands the session will be billed to their insurance or patient directly.  Patient was informed only the group patients  and the clinician are permitted on?the video conference, sessions are not recorded by the clinician, and the patient is not permitted to record the session.? Patient was provided clinician's unique meeting ID prior to session, patient was asked to arrive to virtual session on time just as patient would if we were in the office. Clinician confirmed identification of patient by name and birthdate, provider name, location of patient and clinician, and callback number in case disconnected. Patient consents to behavioral health treatment    Patient Response to Request for Consent: Yes  Visit Type performed: Audio and Video     Number of Attendees in Group: 5    IOP Session 1-Topic of curriculum was “Psychoeducation regarding Emotions and Mental Health.”  Facilitated check in utilizing “Duyen, Thorn and Seed” format and 0-5 safety scale regarding SI.  Showed pts Vianey Guardado’s video on “Empathy” and facilitated discussion regarding the concept of empathy facilitating connection.   Provided handouts including: quotes regarding the need to validate and honor emotions; “The Guest House” by Shannon which encourages pts to accept every emotion that arrives in their experience; handouts describing the symptoms, etiology and treatment recommendations on depression, NORMA, panic, social anxiety, bipolar disorder, and perfectionism to provide pts with accurate diagnostic information in a format that can be shared with loved ones if needed; a mood pictograph;  “The  Vicious Cycle of Depression” and the “The Vicious Cycle of Anxiety” which discuss how the symptoms of depression and anxiety often causes a cycle that feeds upon itself - it suggests to make small changes to shift the cycle such as prioritizing self care; “Threat System” which provides information on the bodily symptoms that occur when our fear system is activated; “Relaxed Breathing” which provides instructions on diaphragmatic breathing to assist in emotional regulation;  LGBTQ and the connection with mental health information from [facility] to increase awareness of the population’s needs; “Disclosing to Others” and “Talking About Mental Illness with your Child” to facilitate connection and support with others; “Moods and Hormones: Emotional Health and Well Being Through the Lifecycle” which discusses the connection between mental health difficulties and hormonal changes for women; information on post partum depression, menopause, and PMS from the American College of Obstetricians and Gynecologists; “Resources for Recovering Resilience: Shit Happens… Shift Happens” by Tiara Rajput which describes how Automatic Negative Thoughts can be shifted through the use of Automatic Negative Thoughts to train our brains and to ultimately make ourselves feel better; and “How Accepting Anxiety Can Lead to Peace” by Zelda Lin which describes the author’s journey to accepting her anxiety and instead of fighting it trying to listen to what it is telling her.           Patients reaction:  Pt shared with the group that she was absent last session due to a challenging morning with her son and not feeling as though it would be appropriate to leave him with other caregivers. PT identified her anxiety about returning to work part time next week and concerns about the transition to working and attending [facility], as well as feeling overwhelmed and also not satisfied with the work she is doing at this time. Pt briefly shared about the impact  "feelings of \"imposter syndrome\" have on her in her work setting, and the potential benefits of finding new employment for herself. Pt also identified that she continues to engage in self care, enjoyed a first date over the weekend and has been able to begin the relationship with healthy boundaries. 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 Pt identified the challenges in completing these tasks due to difficulty with focus and concentration at times.     Mental Status:  Mood: Euthymic (normal), Hopeful  Affect: Full Range  Speech: Regular  Psychomotor Functioning: WNL  Anxiety Level: 3  Depression Level: 0  Anger Level: 0  Safety Scale: 0  Safety Contract:: N/A  Behaviors:  ADL'S:: Good  Social Status:: Normal  Sleeping: Decreased (Inconsistent)  Appetite: Increased  Med Compliance: Yes  Substance Use: Denies  Self Injury:: None    Visit Diagnosis:     ICD-10-CM ICD-9-CM   1. MDD (major depressive disorder), recurrent, in partial remission (CMS/HCC)  F33.41 296.35   2. NORMA (generalized anxiety disorder)  F41.1 300.02       Plan: Continue IOP to : 8/24 MIndfulness  Pt to F/U with treatment goals as outlined in treatment plan.  Pt to F/U with local ED/call 911 should SI/HI arises.    Notes for Treatment day: None    Zayra Berry LCSW        "

## 2022-08-24 ENCOUNTER — TELEMEDICINE (OUTPATIENT)
Dept: PSYCHIATRY | Facility: HOSPITAL | Age: 47
End: 2022-08-24
Attending: COUNSELOR
Payer: COMMERCIAL

## 2022-08-24 DIAGNOSIS — F10.11 ALCOHOL USE DISORDER, MILD, IN EARLY REMISSION: ICD-10-CM

## 2022-08-24 DIAGNOSIS — F33.41 MDD (MAJOR DEPRESSIVE DISORDER), RECURRENT, IN PARTIAL REMISSION (CMS/HCC): Primary | ICD-10-CM

## 2022-08-24 DIAGNOSIS — F50.20 BULIMIA NERVOSA: ICD-10-CM

## 2022-08-24 DIAGNOSIS — F41.1 GAD (GENERALIZED ANXIETY DISORDER): ICD-10-CM

## 2022-08-24 PROCEDURE — S9480 INTENSIVE OUTPATIENT PSYCHIA: HCPCS | Mod: 95 | Performed by: COUNSELOR

## 2022-08-24 ASSESSMENT — COGNITIVE AND FUNCTIONAL STATUS - GENERAL
APPETITE: NO CHANGE
AFFECT: FULL RANGE
MOOD: EUTHYMIC (NORMAL);HOPEFUL;ANXIOUS
PSYCHOMOTOR FUNCTIONING: WNL
SLEEP_WAKE_CYCLE: NO CHANGE
SPEECH: REGULAR

## 2022-08-24 NOTE — GROUP NOTE
"Date:  8/24/2022  Start Time:   9:00 AM  End Time:  12:00 PM    Chel Price, YOB: 1975,  was an active group participant.     Number of Attendees in Group:  6    IOP Session 2:  Topic of curriculum was \"Mindfulness.\"   Facilitated check in utilizing \"Duyen, Thorn and Seed\" format and 0-5 safety scale regarding SI.  Showed Dick Wilhelm's video \"Mindfulness is the New Superpower\" and facilitated discussion surrounding mindfulness in our everyday lives.  Provided handouts including:\"Mindfull vs Minful\" picture;  quote from Emily Armas normalizing restlessness in meditation and to stay with the practice even when it is difficulty; \"What is Mindfulness\" handout which summarizes the basics of mindfulness including the need to practice and to cultivate a non-judgmental attitude; reasons to meditate from [employer]ful [place];  \"Here's How Meditation Reduces Inflammation and Prevents Disease\" by Mackenzie Lima which summarizes recent scientific findings about how meditation improves not only our emotional, but our physical health as well; an excerpt from \"Wherever You Go, There You Are\" by Stan Villa which describes viewing meditation as if we are standing behind a waterfall watching our thoughts pass; \"The 5 Senses Techniqure;\"  \"Attitudinal Foundations of Mindfulness Practice\" which describes the foundations such as patience and beginner's mind that are part of the building a mindfulness practice; \"Relaxing Sighs\" which describes the skill of using relaxing sighs to calm ourselves and be mindful; \"Mindful Breathing\" which provides instructions on using breathing techniques to practice mindfulness in the moment; \"Mindfulness Meditation Resources\" such as mindfulness apps, and mindfulness reminders; \"Developing Mindfulness Triggers\" by Shirley which shares ideas of how to integrate mindfulness into our daily lives; \"What Are Savanah Beads and How Do I Use Them?\" by Vilma Michelle and \"DIY Savanah " "Beads\" from [employer] [place] which provides instructions for members to make their own emilia beads;  \"Resources for Recovering Resilience: Sense and Savor Walk\" which provides instructions on how to practice a walking meditation; and several mandalas for pts to color.     Request for Consent  Patient provided verbal consent to treat via telemedicine. Clinician introduced the secure telemedicine platform that we are utilizing to provide care during the COVID-19 pandemic. Patient understands the session will be billed to their insurance or patient directly.  Patient was informed only the group patients  and the clinician are permitted on?the video conference, sessions are not recorded by the clinician, and the patient is not permitted to record the session.? Patient was provided clinician's unique meeting ID prior to session, patient was asked to arrive to virtual session on time just as patient would if we were in the office. Clinician confirmed identification of patient by name and birthdate, provider name, location of patient and clinician, and callback number in case disconnected. Patient consents to behavioral health treatment    Patient Response to Request for Consent: Yes  Visit Type performed: Audio and Video        Patients reaction:  Pt expressed increased anxiety today related to RTW.  LPC encouraged pt to consider what more she would like to get out of IOP and how group can support her.  She also shared her support for peers.  Pt appreciated mindfulness resources and discussed how she is trying to notice how she already practices mindfulness in her life - when watering her plants and preparing her son's morning medication.    Mental Status:  Mood: Euthymic (normal), Hopeful, Anxious  Affect: Full Range  Speech: Regular  Psychomotor Functioning: WNL  Anxiety Level: 3  Depression Level: 1 (\".5\")  Anger Level: 0  Safety Scale: 0  Safety Contract:: N/A  Behaviors:  ADL'S:: Good  Social Status:: " Normal  Sleeping: No Change  Appetite: No Change  Med Compliance: Yes  Substance Use: Denies  Self Injury:: None    Visit Diagnosis:     ICD-10-CM ICD-9-CM   1. MDD (major depressive disorder), recurrent, in partial remission (CMS/HCC)  F33.41 296.35   2. NORMA (generalized anxiety disorder)  F41.1 300.02   3. Alcohol use disorder, mild, in early remission  F10.11 305.03   4. Bulimia nervosa  F50.2 307.51       Plan: Continue IOP to : Self Talk  Pt to F/U with treatment goals as outlined in treatment plan.  Pt to F/U with local ED/call 911 should SI/HI arises.    Notes for Treatment day: LPC and pt met on the break and discussed d/c planning as well as absence next Friday.  This will be pts 4th absence and LPC strongly encouraged her to consider her needs for IOP and ability to reschedule her plans.    Natali Tolentino, ANDREAS

## 2022-08-26 ENCOUNTER — TELEPHONE (OUTPATIENT)
Dept: PSYCHIATRY | Facility: HOSPITAL | Age: 47
End: 2022-08-26
Payer: COMMERCIAL

## 2022-08-26 ENCOUNTER — TELEMEDICINE (OUTPATIENT)
Dept: PSYCHIATRY | Facility: HOSPITAL | Age: 47
End: 2022-08-26
Attending: COUNSELOR
Payer: COMMERCIAL

## 2022-08-26 DIAGNOSIS — F33.41 MDD (MAJOR DEPRESSIVE DISORDER), RECURRENT, IN PARTIAL REMISSION (CMS/HCC): Primary | ICD-10-CM

## 2022-08-26 DIAGNOSIS — F50.20 BULIMIA NERVOSA: ICD-10-CM

## 2022-08-26 DIAGNOSIS — F10.11 ALCOHOL USE DISORDER, MILD, IN EARLY REMISSION: ICD-10-CM

## 2022-08-26 DIAGNOSIS — F41.1 GAD (GENERALIZED ANXIETY DISORDER): ICD-10-CM

## 2022-08-26 PROCEDURE — S9480 INTENSIVE OUTPATIENT PSYCHIA: HCPCS | Mod: 95 | Performed by: COUNSELOR

## 2022-08-26 ASSESSMENT — COGNITIVE AND FUNCTIONAL STATUS - GENERAL
AFFECT: FULL RANGE
MOOD: EUTHYMIC (NORMAL);HOPEFUL;ANXIOUS
APPETITE: NO CHANGE
SPEECH: REGULAR
PSYCHOMOTOR FUNCTIONING: WNL
SLEEP_WAKE_CYCLE: INCREASED

## 2022-08-26 NOTE — TELEPHONE ENCOUNTER
Email from pt:  Darci Min,    [employer] told me they sent you a form to complete in mid-August, but Petrona said you haven't received anything. I've attached the form here and also sent it through the [employer] website.     Please let me know if you have any questions.     My return to work date is this coming Monday ([place]!) and I will continue doing the IOP while working. I will be working 20 hours/week while still participating in the [facility].     THank you so much!    Chel  With [employer] document.     SW reply:  Darci Milligan-     Ok, I received both emails.  Thank you.      I will make return to work for Monday 8/29/22 but will need to be specific about recommendations for part time work schedule.  Have you developed a weekly schedule for work hours around IOP?  Please let me know that scheduled.  I plan to have you return to full time work when you discharge from IOP so will set that date for 9/12/22.       Thank you-     Pt reply:  Thanks so much, Mechelle! The date I’ve set with my company for returning to work on a regular full time schedule is 9/19. [employer] made it sound like it would be good for me to be in IOP for another few weeks so I aimed to be conservative.     My house during IOP once I start work will be   MWF: 1-4  T/Th: 11-4:30    Please let me know if you have additional l questions.    Thank you!    Chel    Fax in ppw and will reply to pt next week.

## 2022-08-26 NOTE — GROUP NOTE
Date:  8/26/2022  Start Time:   9:00 AM  End Time:  12:00 PM    Chel Price, YOB: 1975,  was an active group participant.     Number of Attendees in Group:  5    IOP Session 3: Topic of curriculum was “Self Care and Stress Management.” Facilitated check in utilizing “Duyen, Thorn and Seed” format and 0-5 safety scale regarding SI.   Provided handouts including:  “As a person…” a poem reminding pts of their rights; “Maslow’s Hierarchy of Needs” which presents a framework of where to invest our energy to get our needs met; “Make Your Own Mental Health Self Care Kit” by Leonora Carrera which provides ideas of what sensory soothing items can be used in a “kit” to take care of ourselves; a list of coping skills generated by previous IOP groups; “So What is Self Care?” and “Positive Steps to Wellbeing” which describe the basics of self care;  information on EFT/Tapping (from [facility]) including information on meridian points and where they connect to in the body by Madhu Reno; “Acupressure Points for the Hands” by Madhu Reno;    “Breathing: The Little Known Secret to Peace of Mind” which describes alternate nostril breathing; “Twelve Simple Tips to Improve Your Sleep” from Levine, Susan. \Hospital Has a New Name and Outlook.\"" which describes 12 tips to assist pts in obtaining more restful and peaceful sleep;  “Sensual Awareness Inventory” by Saurabh Summers which encourages members to list what they experience as soothing in regards to their 5 senses; “Modifying Mood and Meaning with Music and Poetry” by Laine Davis which provide directions on using music to shift our emotional experience; “Music as Medicine” by Kamille Valeljo which describes how music therapy can improve health outcomes; “Why Girl Gangs Make for Good Health” by Briseyda Song which summarizes the research that supports women making and cultivating strong and intimate female relationships and how it positively impacts both their physical and  emotional well being;; “Resources for Recovering Resilience: Hanging Out with Healthy Brains” by Tiara Rajput which encourages pts to spend time with people who are resilient and to obtain a “gratitude shilpa” to assist in building a gratitude practice; and “Resources for Recovering Resilience: Creating a [place] of Support” by Tiara Rajput which describes how visualizing people who are supportive to use in a moment of distress can be just as powerful as if we were able to actually be near our supports in that moment.    Request for Consent  Patient provided verbal consent to treat via telemedicine. Clinician introduced the secure telemedicine platform that we are utilizing to provide care during the COVID-19 pandemic. Patient understands the session will be billed to their insurance or patient directly.  Patient was informed only the group patients  and the clinician are permitted on?the video conference, sessions are not recorded by the clinician, and the patient is not permitted to record the session.? Patient was provided clinician's unique meeting ID prior to session, patient was asked to arrive to virtual session on time just as patient would if we were in the office. Clinician confirmed identification of patient by name and birthdate, provider name, location of patient and clinician, and callback number in case disconnected. Patient consents to behavioral health treatment    Patient Response to Request for Consent: Yes  Visit Type performed: Audio and Video        Patients reaction:  Pt shared some increased anxiety related to RTW next week.  Her мария is social activities this week and gratitude that she has made a connection with another single mom.  Her thorn is overwhelm about preparing to RTW and get things in place in her home.  Pt shared her seed to continue getting things done in house.  Pt also related to the topic of self care, liking the idea that self care is being a friend to oneself.  Pt also  provided supportive and empathetic feedback for peers today.    Mental Status:  Mood: Euthymic (normal), Hopeful, Anxious  Affect: Full Range  Speech: Regular  Psychomotor Functioning: WNL  Anxiety Level: 3  Depression Level: 0  Anger Level: 0  Safety Scale: 0  Safety Contract:: N/A  Behaviors:  ADL'S:: Good  Social Status:: Normal  Sleeping: Increased  Appetite: No Change  Med Compliance: Yes  Substance Use: Denies  Self Injury:: None    Visit Diagnosis:     ICD-10-CM ICD-9-CM   1. MDD (major depressive disorder), recurrent, in partial remission (CMS/HCC)  F33.41 296.35   2. NORMA (generalized anxiety disorder)  F41.1 300.02   3. Alcohol use disorder, mild, in early remission  F10.11 305.03   4. Bulimia nervosa  F50.2 307.51       Plan: Continue IOP to : Body Image  Pt to F/U with treatment goals as outlined in treatment plan.  Pt to F/U with local ED/call 911 should SI/HI arises.    Notes for Treatment day: Pt inquired about needed RTW paperwork and will work with SW to complete necessary ppwk.    Natali Tolentino, LPC

## 2022-08-29 ENCOUNTER — DOCUMENTATION (OUTPATIENT)
Dept: PSYCHIATRY | Facility: HOSPITAL | Age: 47
End: 2022-08-29
Payer: COMMERCIAL

## 2022-08-29 ENCOUNTER — TELEMEDICINE (OUTPATIENT)
Dept: PSYCHIATRY | Facility: HOSPITAL | Age: 47
End: 2022-08-29
Attending: COUNSELOR
Payer: COMMERCIAL

## 2022-08-29 DIAGNOSIS — F10.11 ALCOHOL USE DISORDER, MILD, IN EARLY REMISSION: ICD-10-CM

## 2022-08-29 DIAGNOSIS — F33.41 MDD (MAJOR DEPRESSIVE DISORDER), RECURRENT, IN PARTIAL REMISSION (CMS/HCC): Primary | ICD-10-CM

## 2022-08-29 DIAGNOSIS — F41.1 GAD (GENERALIZED ANXIETY DISORDER): ICD-10-CM

## 2022-08-29 DIAGNOSIS — F50.20 BULIMIA NERVOSA: ICD-10-CM

## 2022-08-29 PROCEDURE — S9480 INTENSIVE OUTPATIENT PSYCHIA: HCPCS | Mod: 95 | Performed by: COUNSELOR

## 2022-08-29 ASSESSMENT — COGNITIVE AND FUNCTIONAL STATUS - GENERAL
AFFECT: FULL RANGE
SLEEP_WAKE_CYCLE: INCREASED
PSYCHOMOTOR FUNCTIONING: WNL
APPETITE: NO CHANGE
SPEECH: REGULAR
MOOD: EUTHYMIC (NORMAL);HOPEFUL;ANXIOUS

## 2022-08-29 NOTE — PROGRESS NOTES
email reply:  Darci Milligan-    I Fax in completed form to [employer] last Friday.  I wrote out specifics of your return to work schedule for part time hours and return you to full time schedule next business day after you discharge from [facility].  I have your discharge date as 9/9/22 so full time return to work is on 9/12/22.      Take Care-    HIM records request made for 8/3 to present be Fax to [employer].

## 2022-08-29 NOTE — GROUP NOTE
Date:  8/29/2022  Start Time:   9:00 AM  End Time:  12:00 PM    Chel Price, YOB: 1975,  was an active group participant.    Number of Attendees in Group:  6    [facility] Session 4: Topic of curriculum was “Body Image.”  Facilitated check in utilizing “Duyen, Thorn and Seed” format and 0-5 safety scale regarding SI.   Provided and facilitated discussion on handouts including: “Dear Society” a letter by Sierra Hilario about her rejection of society’s norms/expectations for women;  “If I Had a Little Girl…” which prompts members to consider what they would say to the little girl within themselves; “Are We Finally Fed Up With the Media’s Unrealistic Portrayal of Women’s Bodies?” by Shannon Holder which describes how even “normal” bodies portrayed in the media are not realistic or representative of the greater society; “Tips for Becoming a Critical Viewer of the Media” which describes being aware that advertisements are constructions to create vulnerability in us to purchase a product and strategies to fight media messages;  “Women Need Mindfulness Even More Than Men Do” by Mary Casarez which describes how women are more likely to multitask and worry so mindfulness is highly beneficial for them; “Listen to Your Body” from National Eating Disorders.org which promotes listening to and honoring the signals our body gives us as a compass for what we need; “Interoception: Know Yourself Inside and Out” by Rob Parks which discusses how there are neurons in our guts and how we can increase our awareness of our bodies; “How Trauma Affects Our Relationship with Our Bodies” by Dayan Collado which describes how we often self soothe with food or other external resources when trauma is unprocessed in our bodies;  “Emotional Eating: How to Recognize and Stop Emotional Eating” which describes the difference between emotional and physical hunger and strategies to identify what we need emotionally to fill the need in a  healthy manner; “Types and Symptoms of Eating Disorders” and descriptions of eating disorders from National Eating Disorders.org;  “How Would You Treat a Friend?” which highlights our tendency to treat others with compassion more so than we treat ourselves - the exercise prompts pts to consider how they would treat a friend and to turn that kindness back on themselves; “Resources for Recovering Resilience: Looking in the Mirror with Kindness” by Tiara Rajput which prompts pts to take time to look at themselves in the mirror and to appreciate their positive attributes; a body image activity which prompts members to draw their bodies and then identify 5 aspects they like and appreciate about their bodies; and reminder of starting to accept our bodies starts small.        Request for Consent  Patient provided verbal consent to treat via telemedicine. Clinician introduced the secure telemedicine platform that we are utilizing to provide care during the COVID-19 pandemic. Patient understands the session will be billed to their insurance or patient directly.  Patient was informed only the group patients  and the clinician are permitted on?the video conference, sessions are not recorded by the clinician, and the patient is not permitted to record the session.? Patient was provided clinician's unique meeting ID prior to session, patient was asked to arrive to virtual session on time just as patient would if we were in the office. Clinician confirmed identification of patient by name and birthdate, provider name, location of patient and clinician, and callback number in case disconnected. Patient consents to behavioral health treatment    Patient Response to Request for Consent: Yes  Visit Type performed: Audio and Video       Patients reaction:  Pt shared that she is experiencing increased anxiety related to RTW today.  LPC offered support and validation noting that this is a common feeling and group is available to support  pt in this transition. She was offered support from peers and as open to suggestions.  Pt was also active in topic discussion and shared how her past eating disorder impacted her body image.  Pt expressed her мария is her son.  Her thorn is anxiety about work.  Pt will use positive self talk and coping statements when work anxiety arises.  Her seed is to go swimming with son later which will also be an act of self care.      Mental Status:  Mood: Euthymic (normal), Hopeful, Anxious  Affect: Full Range  Speech: Regular  Psychomotor Functioning: WNL  Anxiety Level: 4  Depression Level: 1  Anger Level: 0  Safety Scale: 0  Safety Contract:: N/A  Behaviors:  ADL'S:: Good  Social Status:: Normal  Sleeping: Increased  Appetite: No Change  Med Compliance: Yes  Substance Use: Denies  Self Injury:: None    Visit Diagnosis:     ICD-10-CM ICD-9-CM   1. MDD (major depressive disorder), recurrent, in partial remission (CMS/HCC)  F33.41 296.35   2. NORMA (generalized anxiety disorder)  F41.1 300.02   3. Alcohol use disorder, mild, in early remission  F10.11 305.03   4. Bulimia nervosa  F50.2 307.51       Plan: Continue IOP to : Self Esteem  Pt to F/U with treatment goals as outlined in treatment plan.  Pt to F/U with local ED/call 911 should SI/HI arises.    Notes for Treatment day: LPC met with pt on the break and reviewed and updated her treatment plan.  LPC and pt discussed discharge plan for 9/9/22.  LPC will have FD call pt to schedule step down eval for OP groups.    Natali Tolentino, ANDREAS

## 2022-08-31 ENCOUNTER — TELEMEDICINE (OUTPATIENT)
Dept: PSYCHIATRY | Facility: HOSPITAL | Age: 47
End: 2022-08-31
Attending: COUNSELOR
Payer: COMMERCIAL

## 2022-08-31 DIAGNOSIS — F41.1 GAD (GENERALIZED ANXIETY DISORDER): ICD-10-CM

## 2022-08-31 DIAGNOSIS — F33.41 MDD (MAJOR DEPRESSIVE DISORDER), RECURRENT, IN PARTIAL REMISSION (CMS/HCC): Primary | ICD-10-CM

## 2022-08-31 DIAGNOSIS — F50.20 BULIMIA NERVOSA: ICD-10-CM

## 2022-08-31 DIAGNOSIS — F10.11 ALCOHOL USE DISORDER, MILD, IN EARLY REMISSION: ICD-10-CM

## 2022-08-31 PROCEDURE — S9480 INTENSIVE OUTPATIENT PSYCHIA: HCPCS | Mod: 95 | Performed by: COUNSELOR

## 2022-09-06 ENCOUNTER — TELEMEDICINE (OUTPATIENT)
Dept: PSYCHIATRY | Facility: HOSPITAL | Age: 47
End: 2022-09-06
Attending: SOCIAL WORKER
Payer: COMMERCIAL

## 2022-09-06 DIAGNOSIS — F10.11 ALCOHOL USE DISORDER, MILD, IN EARLY REMISSION: ICD-10-CM

## 2022-09-06 DIAGNOSIS — F50.20 BULIMIA NERVOSA: ICD-10-CM

## 2022-09-06 DIAGNOSIS — F33.41 MDD (MAJOR DEPRESSIVE DISORDER), RECURRENT, IN PARTIAL REMISSION (CMS/HCC): Primary | ICD-10-CM

## 2022-09-06 DIAGNOSIS — F41.1 GAD (GENERALIZED ANXIETY DISORDER): ICD-10-CM

## 2022-09-06 PROCEDURE — S9480 INTENSIVE OUTPATIENT PSYCHIA: HCPCS | Mod: 95 | Performed by: SOCIAL WORKER

## 2022-09-06 ASSESSMENT — COGNITIVE AND FUNCTIONAL STATUS - GENERAL
AFFECT: FULL RANGE
PSYCHOMOTOR FUNCTIONING: WNL
APPETITE: NO CHANGE
SLEEP_WAKE_CYCLE: DECREASED
MOOD: EUTHYMIC (NORMAL);HOPEFUL;ANXIOUS
SPEECH: REGULAR

## 2022-09-06 NOTE — GROUP NOTE
Date:  9/6/2022  Start Time:   9:00 AM  End Time:  12:00 PM    Chel Price, YOB: 1975,  was an active group participant.    Request for Consent  Patient provided verbal consent to treat via telemedicine. Clinician introduced the secure telemedicine platform that we are utilizing to provide care during the COVID-19 pandemic. Patient understands the session will be billed to their insurance or patient directly.  Patient was informed only the group patients  and the clinician are permitted on?the video conference, sessions are not recorded by the clinician, and the patient is not permitted to record the session.? Patient was provided clinician's unique meeting ID prior to session, patient was asked to arrive to virtual session on time just as patient would if we were in the office. Clinician confirmed identification of patient by name and birthdate, provider name, location of patient and clinician, and callback number in case disconnected. Patient consents to behavioral health treatment    Patient Response to Request for Consent: Yes  Visit Type performed: Audio and Video    Number of Attendees in Group:  7    IOP Session 7:  Topic of curriculum was Grief and Loss.  Facilitated check in utilizing Duyen, Thorn and Seed format and 0-5 safety scale regarding SI.  Provided handouts including: Grief Quotes, Bereavement Quotes which provides several quotes to assist pts in describing and verbalizing their experiences; Welcome to Laci by Adelaide Pritchard; What is Grief and Loss? which summarizes the experience of grief and loss as being both physical and emotional challenges common misconceptions about grief; Healing Your Grieving Body: Physical Practices for Mourners by Nahid Wagoner, Ph.D. which encourages bodily self care during the grieving process; The Longest Goodbye by Rajiv Oleary which explores how yoga can be supportive and healing during grieving; Healing After Loss: Meditation  For Grieving by Tremaine Swenson; Loss and Grief - Activities to Help You Grieve which suggests 20 coping skills to assist in processing grief and engaging in self care; See the Glass as Already Broken (and Everything Else Too) from Dont Sweat the Small Stuff by Rodri Naylor this encourages pts to accept change and loss as part of life and to increase presence and appreciation for right now; an excerpt from The Missing Piece by Tamie Kinsey which encourages reflection of what it means to be whole even if we are missing a piece; Theres Purpose in Pain and a Gift in Every Loss by Magdalena Hamiltonilly which shares the authors journey to find meaning in her loss;  Feeling Supported which describes visualizing the support and comfort of loved ones as a coping skill to assist when distressed; Grief Sentence Completion to assist pts in processing their grief through writing and journaling;  a definition of gratitude; Five Myths about Gratitude by Schuyler Rivera which corrects misunderstandings of what is gratitude and how it impacts our lives; 6 Ways Gratitude Can Improve Your Life and Make You Happier by Arleen Shea which describes additional benefits of a gratitude practice including improved relationships and improved sleep;  and Gratitude Journal and Four Great Gratitude Strategies by Yuliet Jordan which summarizes the research on the benefits of gratitude and provides strategies to build a gratitude practice.            Patients reaction:  Pt started back at work today (notified LCSW that she would have to leave at 11:30 today to attend a mandatory work meeting) and is anticipating that her graduation date will be this Friday, 9/9. Pt was supportive and encouraging to group members. Pt found today's topic beneficial and helpful. She has been feeling increased anxiety r/t son's father visiting and staying at her home. Pt is managing her anxiety well.     Mental Status:  Mood: Euthymic (normal),  "Hopeful, Anxious  Affect: Full Range  Speech: Regular  Psychomotor Functioning: WNL  Anxiety Level: 2  Depression Level: 1  Anger Level: 0  Safety Scale: 0  Safety Contract:: N/A  Behaviors:  ADL'S:: Good  Social Status:: Normal  Sleeping: Decreased (\"all over the place\")  Appetite: No Change  Med Compliance: Yes  Substance Use: Denies  Self Injury:: None    Visit Diagnosis:     ICD-10-CM ICD-9-CM   1. MDD (major depressive disorder), recurrent, in partial remission (CMS/HCC)  F33.41 296.35   2. NORMA (generalized anxiety disorder)  F41.1 300.02   3. Alcohol use disorder, mild, in early remission  F10.11 305.03   4. Bulimia nervosa  F50.2 307.51       Plan: Continue IOP to : Maintain coping skills to manage anxiety and return to work.   Pt to F/U with treatment goals as outlined in treatment plan.  Pt to F/U with local ED/call 911 should SI/HI arises.    Notes for Treatment day: Pt has a review scheduled for today. Started back at work. Possible d/c on Friday, 9/9.     Roseline Villagran LCSW        "

## 2022-09-07 ENCOUNTER — TELEMEDICINE (OUTPATIENT)
Dept: PSYCHIATRY | Facility: HOSPITAL | Age: 47
End: 2022-09-07
Attending: COUNSELOR
Payer: COMMERCIAL

## 2022-09-07 DIAGNOSIS — F33.41 MDD (MAJOR DEPRESSIVE DISORDER), RECURRENT, IN PARTIAL REMISSION (CMS/HCC): Primary | ICD-10-CM

## 2022-09-07 DIAGNOSIS — F50.20 BULIMIA NERVOSA: ICD-10-CM

## 2022-09-07 DIAGNOSIS — F10.11 ALCOHOL USE DISORDER, MILD, IN EARLY REMISSION: ICD-10-CM

## 2022-09-07 DIAGNOSIS — F41.1 GAD (GENERALIZED ANXIETY DISORDER): ICD-10-CM

## 2022-09-07 PROCEDURE — S9480 INTENSIVE OUTPATIENT PSYCHIA: HCPCS | Mod: 95 | Performed by: COUNSELOR

## 2022-09-07 ASSESSMENT — COGNITIVE AND FUNCTIONAL STATUS - GENERAL
SPEECH: REGULAR
AFFECT: TENSE
PSYCHOMOTOR FUNCTIONING: WNL
SLEEP_WAKE_CYCLE: DECREASED
MOOD: ANXIOUS
APPETITE: NO CHANGE

## 2022-09-07 NOTE — GROUP NOTE
Date:  9/7/2022  Start Time:   9:00 AM  End Time:  12:00 PM    Chel Price, YOB: 1975,  { Participation:[id number]}    Number of Attendees in Group:  3    IOP Session 8: Topic of curriculum was Emotion Regulation and Brain Care.  Facilitated check in utilizing Duyen, Thorn and Seed format and 0-5 safety scale regarding SI.  Presented video explaining the concept of neuroplasticity and facilitated discussion about how we can change our brains.  Clinician provided handouts including: Still I Rise by Lexi Erickson to increase pts sense of empowerment; Pictures of Emotions and a feelings chart to assist pts in identifying their feeling states in an effort to soothe themselves and communicate what they are feelings to others; Basic Emotion Assessment which provides the option of pts quantifying their emotion to assist in identification and communication of emotions; How Breathing Affects Feelings which explains how anxious breathing only increases our anxious symptoms and how slowing our breathing is beneficial emotionally and physiologically; Riding the Wave of Emotions by Zhou Cheng which describes the use of the wave metaphor to manage our emotions;  Model of Describing Emotions which breaks down the triggers, interpretations, emotion experiencing, and aftereffects of a situation to assist pts in understanding the process of their reactions; Triggers handouts which prompts members to identify their triggers and construct plans to avoid or address their triggers; Description of the Wise Mind and strategies to enhance and cultivate the use of our wise mind;  Emotion Regulation Skills including opposite action, check the facts, and radical acceptance to assist pts in feeling more in control of their emotions; Anger Warning Signs and Anger Management Skills to increase pts awareness of the bodily signals that tell us we are getting angry and then suggestions on how to self sooth in the  moment; Brain Care is Self Care by Tiara Rajput which summarizes the research on how our brains work and ways we can take care of our brains; and Resources for Recovering Resilience: Hand on the Heart by Tiara Rajput which describes a coping skills of releasing oxytocin to self soothe in the moment.            Patients reaction:  ***.    Mental Status:     Behaviors:       Visit Diagnosis: No diagnosis found.    Plan: {Plan:[id number]}  Pt to F/U with treatment goals as outlined in treatment plan.  Pt to F/U with local ED/call 911 should SI/HI arises.    Notes for Treatment day: ***    Nadege Lala, ANDREAS

## 2022-09-07 NOTE — GROUP NOTE
Date:  9/7/2022  Start Time:   9:00 AM  End Time:  12:00 PM    Chel Price, YOB: 1975,  was an active group participant.    Number of Attendees in Group:  7    IOP Session 8: Topic of curriculum was Emotion Regulation and Brain Care.  Facilitated check in utilizing Duyen, Thorn and Seed format and 0-5 safety scale regarding SI.  Presented video explaining the concept of neuroplasticity and facilitated discussion about how we can change our brains.  Clinician provided handouts including: Still I Rise by Lexi Erickson to increase pts sense of empowerment; Pictures of Emotions and a feelings chart to assist pts in identifying their feeling states in an effort to soothe themselves and communicate what they are feelings to others; Basic Emotion Assessment which provides the option of pts quantifying their emotion to assist in identification and communication of emotions; How Breathing Affects Feelings which explains how anxious breathing only increases our anxious symptoms and how slowing our breathing is beneficial emotionally and physiologically; Riding the Wave of Emotions by Zhou Cheng which describes the use of the wave metaphor to manage our emotions;  Model of Describing Emotions which breaks down the triggers, interpretations, emotion experiencing, and aftereffects of a situation to assist pts in understanding the process of their reactions; Triggers handouts which prompts members to identify their triggers and construct plans to avoid or address their triggers; Description of the Wise Mind and strategies to enhance and cultivate the use of our wise mind;  Emotion Regulation Skills including opposite action, check the facts, and radical acceptance to assist pts in feeling more in control of their emotions; Anger Warning Signs and Anger Management Skills to increase pts awareness of the bodily signals that tell us we are getting angry and then suggestions on how to self sooth  in the moment; Brain Care is Self Care by Tiara Rajput which summarizes the research on how our brains work and ways we can take care of our brains; and Resources for Recovering Resilience: Hand on the Heart by Tiara Rajput which describes a coping skills of releasing oxytocin to self soothe in the moment.            Patients reaction: Pt was actively engaged in group topic and discussion.  In response to psycho-ed topic of emotion regulation, pt was able to relate Wise Mind skills to interpersonal conflict that occurred this morning.  Pt states that her мария is her son managing first day of school well.  Pts kennedi is co-parenting challenges.  Pt sets intention to exercise on treadmill today. Note: Group was run collaboratively by therapists, MARISA Henderson LPC and ASHLEY Lala LPC..    Mental Status:  Mood: Anxious  Affect: Tense  Speech: Regular  Psychomotor Functioning: WNL  Anxiety Level: 3  Depression Level: 0  Anger Level: 1  Safety Scale: 0  Safety Contract:: N/A  Behaviors:  ADL'S:: Good  Social Status:: Minimal  Sleeping: Decreased  Appetite: No Change  Med Compliance: Yes  Substance Use: Denies  Self Injury:: None    Visit Diagnosis:     ICD-10-CM ICD-9-CM   1. MDD (major depressive disorder), recurrent, in partial remission (CMS/HCC)  F33.41 296.35   2. NORMA (generalized anxiety disorder)  F41.1 300.02   3. Alcohol use disorder, mild, in early remission  F10.11 305.03   4. Bulimia nervosa  F50.2 307.51       Plan: Continue IOP to : Healthy Relationships and Boundaries   Pt to F/U with treatment goals as outlined in treatment plan.  Pt to F/U with local ED/call 911 should SI/HI arises.    Notes for Treatment day: None     Cammy Henderson LPC

## 2022-09-08 ENCOUNTER — TELEPHONE (OUTPATIENT)
Dept: PSYCHIATRY | Facility: HOSPITAL | Age: 47
End: 2022-09-08
Payer: COMMERCIAL

## 2022-09-08 NOTE — TELEPHONE ENCOUNTER
LPC received call back from pt to discuss her insurance coverage as well as a plan for graduation from [employer] tomorrow.  Pt stated her insurance has been reinstated and will be back dated.  LPC will f/u with authorizations manager.  LPC and pt then discussed discharge plan for pt to step down to OP groups as pt noted she enjoys [employer] and wishes she could attend group 1 hour per week.  LPC and pt plan to meet on the break tomorrow to discuss discharge summary.  Pt will complete OWLs and will come prepared with after care appointments.

## 2022-09-12 ENCOUNTER — TELEMEDICINE (OUTPATIENT)
Dept: PSYCHIATRY | Facility: HOSPITAL | Age: 47
End: 2022-09-12
Attending: COUNSELOR
Payer: COMMERCIAL

## 2022-09-12 DIAGNOSIS — F41.1 GAD (GENERALIZED ANXIETY DISORDER): ICD-10-CM

## 2022-09-12 DIAGNOSIS — F33.41 MDD (MAJOR DEPRESSIVE DISORDER), RECURRENT, IN PARTIAL REMISSION (CMS/HCC): Primary | ICD-10-CM

## 2022-09-12 DIAGNOSIS — F50.20 BULIMIA NERVOSA: ICD-10-CM

## 2022-09-12 DIAGNOSIS — F10.11 ALCOHOL USE DISORDER, MILD, IN EARLY REMISSION: ICD-10-CM

## 2022-09-12 PROCEDURE — S9480 INTENSIVE OUTPATIENT PSYCHIA: HCPCS | Mod: 95 | Performed by: COUNSELOR

## 2022-09-12 ASSESSMENT — COGNITIVE AND FUNCTIONAL STATUS - GENERAL
MOOD: ANXIOUS;HOPEFUL;FRUSTRATED
PSYCHOMOTOR FUNCTIONING: WNL
APPETITE: NO CHANGE
SPEECH: REGULAR
AFFECT: FULL RANGE
SLEEP_WAKE_CYCLE: NO CHANGE

## 2022-09-12 NOTE — DISCHARGE SUMMARY
Discharge Summary     Patient Name: Chel Price  : 1975  Treatment start date: 22  Treatment end date: 22    Discharge Type: Mental Health  Discharge Reason: Program Complete    Reason for admission and treatment: Chel presented to [facility] at [facility] following the PHP program which pt was discharged from on 22.  On admission to [facility], pt reported symptoms of depression and anxiety a/e/b racing thoughts, obsessions, avoidant behaviors, decreased sleep, worthlessness, social withdrawal, decreased concentration, irritability, anhedonia and missing work.  Pt reported being impacted by her son's ASD and ADHD dx and his aggressive behaviors, being a single parent with limited social supports, trauma history, and alcohol abuse history.    Services offered and response to treatment: Chel was offered and engaged in [facility] treatment.  She participated regularly in group, with a few absences, and learned skills of setting healthy boundaries, check the facts, emotional regulation, and self care.  She practiced mindfulness and grounding skills such as 5 senses and counting backwards by 3s.  Pt also utilized coping skills of gardening, taking baths, reading, and listening to music and was able to abstain from alcohol use. She practiced setting healthy boundaries with her mother and learned assertive communication of her needs.  Overall she responded well to treatment and has the skills necessary to step down to a lower level of care.    Client status - Condition upon discharge: Pt is a 45yo SWF  with a 5yo son who presented to [facility] for symptoms of anxiety and depression.  Pt presents as stable and emotionally regulated.  She reports she feels more capable of managing her anxiety at this time and has learned many skills to support her emotional regulation.  Pt is to continue to utilize skills learned in [facility] and will meet weekly with her individual therapist.      Screening Assessments done this visit:         [place]  Depression Screening: Not indicated           Clinical concerns to be addressed in continued care: Chel would benefit from continued practice of coping skills, especially mindfulness.    Aftercare appointments:     - OP therapy Thursday at 2pm and weekly ongoing    - ongoing psychiatry monthly.  She states she will reschedule her appointment this week due to parent teacher conference.    - Step down evaluation for OP groups with [facility] on 22 with Zonia Mason LCSW    Special Instructions: None    Medical Follow Up needed?  No     Is patient receiving Medicated Assisted Treatment? No    Mental Health Crisis Support:    [facility] Crisis Number: [phone]   [facility] Crisis Number: [phone]   [facility] Crisis Number: [phone]   [facility] Crisis Number: [phone]      In case of Mental Health Crisis, go to the closest Emergency Department, call , or contact the National Suicide Prevention Hotline at: 1-744.196.9413.  You may also call, text or chat the National Suicide Prevention Hotline at .     Other Support Agencies:  [place] National [place] of Mental Illness: 139.949.9951    [place] Advocacy System: 228.844.1270    Depression & Bipolar [place]: [phone]      Patient offered a copy of plan? Yes    Patient provided a copy? Yes, via AVS in [employer].       Natali Tolentino LPC @ 8:55 AM

## 2022-09-12 NOTE — PATIENT INSTRUCTIONS
Patient Name: Chel Price  : 1975  Treatment start date: 22  Treatment end date: 22    Discharge Type: Mental Health  Discharge Reason: Program Complete    Reason for admission and treatment: Chel presented to [facility] at [facility] following the PHP program which pt was discharged from on 22.  On admission to [facility], pt reported symptoms of depression and anxiety a/e/b racing thoughts, obsessions, avoidant behaviors, decreased sleep, worthlessness, social withdrawal, decreased concentration, irritability, anhedonia and missing work.  Pt reported being impacted by her son's ASD and ADHD dx and his aggressive behaviors, being a single parent with limited social supports, trauma history, and alcohol abuse history.    Services offered and response to treatment: Chel was offered and engaged in [facility] treatment.  She participated regularly in group, with a few absences, and learned skills of setting healthy boundaries, check the facts, emotional regulation, and self care.  She practiced mindfulness and grounding skills such as 5 senses and counting backwards by 3s.  Pt also utilized coping skills of gardening, taking baths, reading, and listening to music and was able to abstain from alcohol use. She practiced setting healthy boundaries with her mother and learned assertive communication of her needs.  Overall she responded well to treatment and has the skills necessary to step down to a lower level of care.    Client status - Condition upon discharge: Pt is a 45yo SWF  with a 5yo son who presented to [facility] for symptoms of anxiety and depression.  Pt presents as stable and emotionally regulated.  She reports she feels more capable of managing her anxiety at this time and has learned many skills to support her emotional regulation.  Pt is to continue to utilize skills learned in [facility] and will meet weekly with her individual therapist.      Screening Assessments done this visit:        [place]   Depression Screening: Not indicated           Clinical concerns to be addressed in continued care: Chel would benefit from continued practice of coping skills, especially mindfulness.    Aftercare appointments:     - OP therapy Thursday at 2pm and weekly ongoing    - ongoing psychiatry monthly.  She states she will reschedule her appointment this week due to parent teacher conference.    - Step down evaluation for OP groups with [facility] on 9/16/22 with Zonia Mason LCSW    Special Instructions: None    Medical Follow Up needed?  No     Is patient receiving Medicated Assisted Treatment? No    Mental Health Crisis Support:    [facility] Crisis Number: [phone]   [facility] Crisis Number: [phone]   [facility] Crisis Number: [phone]   [facility] Crisis Number: [phone]      In case of Mental Health Crisis, go to the closest Emergency Department, call 9-1-1, or contact the National Suicide Prevention Hotline at: 1-265.791.9518.  You may also call, text or chat the National Suicide Prevention Hotline at 9-8-8.     Other Support Agencies:  [place] of [place]ness: [phone]    [place] Advocacy System: [phone]    Depression & Bipolar [place]: [phone]      Patient offered a copy of plan? Yes    Patient provided a copy? Yes, via [employer] in [employer].       Natali Tolentino LPC @ 8:55 AM

## 2022-09-12 NOTE — GROUP NOTE
Date:  9/12/2022  Start Time:   9:00 AM  End Time:  12:00 PM    Chel Price, YOB: 1975,  was an active group participant.    Number of Attendees in Group:  7    IOP Session 10: Topic of curriculum was Attachment Style and Caregiving.  Facilitated check in utilizing Duyen, Thorn and Seed format and 0-5 safety scale regarding SI.  Provided handouts including: the lyrics to the song Grateful by Charlotte Ibrahim; How to Avoid the Empathy Trap by Madhu Sierra and Fernanda Humphries which explains the need for empathy in relationships, but also presents strategies for pts to become more aware of their empathy traps and how to find a better balance in their relationships; How Does Your Attachment Style Impact Your Adult Relationships? by Kourtney Martinez which presents a brief description of attachment styles and how they may be playing out in our current relationships; Change Your Attachment Style to Have a Better Life which further explores attachment styles and how we can adjust and change our attachment styles; a handout with tips and strategies on behavioral changes to start to build secure attachments; and How to Care for the Caregiver by Trina De La Cruz which summarizes the need for caregivers to prioritize their own well being in order to be present in their caregiving role, it also presents suggestions on how to take care of ourselves; stress test/ self-care for helpers; video by Donna Nelson which asks us to consider, How would our lives and relationships and our world transform if we stopped being so afraid of pain? What if we just once and for all decided we were strong enough for the pain in our lives and instead of hiding from it, we rushed straight towards it and allowed our pain to become our power?; 5 Love Languages Test which provides better understanding of inherent love languages and Guided Meditation; Lovingkindness: Seeing Past the Mask.     Request for Consent  Patient  provided verbal consent to treat via telemedicine. Clinician introduced the secure telemedicine platform that we are utilizing to provide care during the COVID-19 pandemic. Patient understands the session will be billed to their insurance or patient directly.  Patient was informed only the group patients  and the clinician are permitted on?the video conference, sessions are not recorded by the clinician, and the patient is not permitted to record the session.? Patient was provided clinician's unique meeting ID prior to session, patient was asked to arrive to virtual session on time just as patient would if we were in the office. Clinician confirmed identification of patient by name and birthdate, provider name, location of patient and clinician, and callback number in case disconnected. Patient consents to behavioral health treatment    Patient Response to Request for Consent: Yes  Visit Type performed: Audio and Video        Patients reaction:  Pt successfully completed IOP today.  She shared that she appreciated the mediatation because it reminded her of her cat who recently passed.  Pt also reported struggling with frustration with her sons father.  Duyen is reconnecting with exboyfriend and feeling intimate with someone again.  Edith is anxiety related to her son and school calling CPS.  Pt saw the benefits to the situation as she was able to stay very calm.  Seed is to getting into a routine with son.  Pt received warm wishes from peers and acknowledged the progress she has made.      Mental Status:  Mood: Anxious, Hopeful, Frustrated  Affect: Full Range  Speech: Regular  Psychomotor Functioning: WNL  Anxiety Level: 1  Depression Level: 0  Anger Level: 1  Safety Scale: 0  Safety Contract:: N/A  Behaviors:  ADL'S:: Good  Social Status:: Normal  Sleeping: No Change  Appetite: No Change  Med Compliance: Yes  Substance Use: Denies  Self Injury:: None    Visit Diagnosis:     ICD-10-CM ICD-9-CM   1. MDD (major  depressive disorder), recurrent, in partial remission (CMS/HCC)  F33.41 296.35   2. NORMA (generalized anxiety disorder)  F41.1 300.02   3. Alcohol use disorder, mild, in early remission  F10.11 305.03   4. Bulimia nervosa  F50.2 307.51       Plan: Continue IOP to : Self Talk  Pt to F/U with treatment goals as outlined in treatment plan.  Pt to F/U with local ED/call 911 should SI/HI arises.    Notes for Treatment day: See discharge summary for additional information.    Natali Tolentino, LPC

## 2022-09-16 ENCOUNTER — TELEMEDICINE (OUTPATIENT)
Dept: PSYCHIATRY | Facility: HOSPITAL | Age: 47
End: 2022-09-16
Attending: COUNSELOR
Payer: COMMERCIAL

## 2022-09-16 DIAGNOSIS — F10.11 ALCOHOL USE DISORDER, MILD, IN EARLY REMISSION: ICD-10-CM

## 2022-09-16 DIAGNOSIS — F33.41 MDD (MAJOR DEPRESSIVE DISORDER), RECURRENT, IN PARTIAL REMISSION (CMS/HCC): Primary | ICD-10-CM

## 2022-09-16 DIAGNOSIS — F41.1 GAD (GENERALIZED ANXIETY DISORDER): ICD-10-CM

## 2022-09-16 PROCEDURE — 90791 PSYCH DIAGNOSTIC EVALUATION: CPT | Mod: 95 | Performed by: COUNSELOR

## 2022-09-16 ASSESSMENT — COGNITIVE AND FUNCTIONAL STATUS - GENERAL
ATTENTION: WNL
CONCENTRATION: WNL
AFFECT: FULL RANGE
EYE_CONTACT: WNL
SPEECH: REGULAR
SLEEP_WAKE_CYCLE: NO CHANGE
MOOD: HOPEFUL;EUTHYMIC (NORMAL);MOTIVATED
INSIGHT: INTACT
AROUSAL LEVEL: ALERT
THOUGHT_CONTENT: APPROPRIATE
RECENT MEMORY: WNL
APPEARANCE: WELL GROOMED
APPETITE: NO CHANGE
PSYCHOMOTOR FUNCTIONING: WNL
ORIENTATION: FULLY ORIENTED
REMOTE MEMORY: WNL

## 2022-09-16 NOTE — PROGRESS NOTES
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 therapist.  Pt reports no current PTSD sx.  There is hx of CYS involvement but no current case.  Pt has hx of successfully completing  eating disorder tx.  Denies any issues with eating.  Pt endorses pain which is being treated by medical providers.  Pts parents and siblings are supportive.  Pts parents are local and help pt with caring for her son.  Based on [place] Suicide Screen and current clinical assessment, patient is determined Low Risk.  Suicide Risk/Suicidal Ideation will not be added to patient's treatment plan.   Follow up Referrals:Psychiatric, Abbie HOANG at [facility], Medical, PCP and medical providers, Trauma, follow up provided by [facility].  OP therapist Dr. Demond Garza  and Pain, PCP and medical providers   Plan:       Patient to F/U with outside provider for Therapy and Psychiatry.  Patient to F/U with Biweekly  [facility], [facility], [facility], [facility] and [facility] psychotherapy for 90 minutes each session.  Patient to F/U with med checks for medication management as directed by prescriber.  Patient to F/U with treatment goals as outlined in treatment plan.  Patient to F/U with local ED or call 911 should SI/HI  arise.    Visit Diagnosis:    ICD-10-CM ICD-9-CM   1. MDD (major depressive disorder), recurrent, in partial remission (CMS/HCC)  F33.41 296.35   2. NORMA (generalized anxiety disorder)  F41.1 300.02   3. Alcohol use disorder, mild, in early remission  F10.11 305.03       Time  Start Time: 1200  End Time: 1250  Total Time: 50  Kaylin Emelina Mason LPC @ 2:34 PM

## 2022-09-29 ENCOUNTER — DOCUMENTATION (OUTPATIENT)
Dept: PSYCHIATRY | Facility: HOSPITAL | Age: 47
End: 2022-09-29
Payer: COMMERCIAL

## 2022-09-29 NOTE — PROGRESS NOTES
Email from pt:    Sena Morales incredibly upset because [employer] just told me that based on what you shared with them regarding the period August 15-29,  my final weeks in [facility], I did not need to be away from work, was capable of working. So they are not going to pay me for that time. They approved my first weeks in the IOP and I just cant understand why these other weeks would be any different. My group therapist Petrona repeatedly encouraged us not to be juggling work and [facility], FWIW.    They said you can send additional information and theyll take another look. Can you please help?    Thank you,    Chle WHITE reply:  Darci Milligan;     Im sorry to hear this has happened.  Can you please send me their denial of disability information, so I can understand their decision please?  Did they offer you an appeal to their decision?       Send me what you got from [employer] and Ill see what I can do.     Take Care-    Pt reply:  Darci Min,    I didnt receive anything official from them - was just told over the phone. So Im not sure what to do. I can call again and ask that they send me something in writing. This is so frustrating, sigh!    Thank you!    Chel WHITE spoke to Rep Saray at [employer], disconnected and reached another Rep who advise CM is Nathaly sent to her line and disconnected again. CHRISTOPHER ask pt for direct line.

## 2022-09-30 ENCOUNTER — TELEPHONE (OUTPATIENT)
Dept: PSYCHIATRY | Facility: HOSPITAL | Age: 47
End: 2022-09-30
Payer: COMMERCIAL

## 2022-09-30 NOTE — TELEPHONE ENCOUNTER
Nathaly [phone], extension [phone], [facility] to discuss denial of benefits.

## 2022-10-05 ENCOUNTER — DOCUMENTATION (OUTPATIENT)
Dept: PSYCHIATRY | Facility: HOSPITAL | Age: 47
End: 2022-10-05
Payer: COMMERCIAL

## 2022-10-05 NOTE — PROGRESS NOTES
LVM for [employer] [facility] CLOTILDE Andersen to get additional info on denial and ask for denial letter be sent with appeal instructions.  SW email update to ptFahad Milligan;    I have left two messages for Nathaly at below phone #.  I have not heard back from her.  You should be getting denial of benefits in writing with appeal process directions.  Please call them to ask they call me or get denial in writing to send to me.    Take Care-

## 2022-10-10 ENCOUNTER — DOCUMENTATION (OUTPATIENT)
Dept: PSYCHIATRY | Facility: HOSPITAL | Age: 47
End: 2022-10-10
Payer: COMMERCIAL

## 2022-10-10 NOTE — PROGRESS NOTES
CHRISTOPHER called Rina OLSEN at [facility] [phone]. LSW LVM but got confused with RTW dates and offered to call back.  LSW reached Erna on call back, verified pt was REC to be off from work while IOP until PT RTW of 8/29/22.  That will address last two weeks and Rina will process leave.      CHRISTOPHER email pt:  Darci Milligan;    I spoke with Rina Lay at [facility] today, I verified you were recommended to be on leave until 8/29/22 and she will process your leave.     Take Care-

## 2022-10-25 ENCOUNTER — TRANSCRIBE ORDERS (OUTPATIENT)
Dept: SCHEDULING | Age: 47
End: 2022-10-25

## 2022-10-25 DIAGNOSIS — M75.82 OTHER SHOULDER LESIONS, LEFT SHOULDER: Primary | ICD-10-CM

## 2022-11-22 ENCOUNTER — HOSPITAL ENCOUNTER (OUTPATIENT)
Dept: RADIOLOGY | Facility: HOSPITAL | Age: 47
Discharge: HOME | End: 2022-11-22
Attending: FAMILY MEDICINE
Payer: COMMERCIAL

## 2022-11-22 DIAGNOSIS — M75.82 OTHER SHOULDER LESIONS, LEFT SHOULDER: ICD-10-CM

## 2023-04-04 ENCOUNTER — DOCUMENTATION (OUTPATIENT)
Dept: PSYCHIATRY | Facility: HOSPITAL | Age: 48
End: 2023-04-04
Payer: COMMERCIAL

## 2023-04-04 NOTE — DISCHARGE SUMMARY
Discharge Summary     Patient Name: Chel Price  : 1975  Treatment start date: 2022  Treatment end date: 23    Discharge Type: Psychotherapy   Discharge Reason: Incomplete Discharge    Reason for admission and treatment: Psychotherapy to address Depression, Anxiety, Social Isolation, Parenting Stress, Trauma, Coping Skills, CBT skills, Building strengths/resilience, Self-compassion and Emotion Regulation  Services offered and response to treatment:  Pt did not follow up to treatment despite multiple calls and letter sent 1/10/23. Pt was provided [place] [place], [place] and [facility] Crisis Numbers in the letter and was encouraged to f/u with [facility] or local emergency room should any SI/HI arise or symptoms worsen. Pt was encouraged to contact [facility] at any time if needed to discuss treatment needs and [facility] availability.  As a result of lapse in treatment and lack of return correspondence, pt’s chart will be moved to closed status.    Client status: Condition upon discharge: Unknown secondary to no patient f/up  Clinical concerns to be addressed in continued care: Unknown secondary to no patient f/up     Aftercare appointments: Unknown secondary to no patient f/up  Special Instructions: None     Medical Follow Up needed?  No  Is patient receiving Medicated Assisted Treatment? No    Mental Health Crisis Support:    [facility] Crisis Number: [phone]   [facility] Crisis Number: 713-900-7324   [facility] Crisis Number: [phone]   [facility] Crisis Number: [phone]      In case of Mental Health Crisis, go to the closest Emergency Department, call , or contact the National Suicide Prevention Hotline at: 1-637.734.9989  Other Support Agencies:  [place] National [place] of Mental Illness: [phone]    [place] Advocacy System: [phone]    Depression & Bipolar [place]: [phone]      Patient offered a copy of plan? No, due to no pt f/up.    Patient  provided a copy?  No, due to no pt f/up       MARVEL Phillips @ 8:54 AM

## 2023-05-31 ENCOUNTER — HOSPITAL ENCOUNTER (OUTPATIENT)
Dept: RADIOLOGY | Age: 48
Discharge: HOME | End: 2023-05-31
Attending: PHYSICIAN ASSISTANT
Payer: COMMERCIAL

## 2023-05-31 ENCOUNTER — TRANSCRIBE ORDERS (OUTPATIENT)
Dept: SCHEDULING | Age: 48
End: 2023-05-31

## 2023-05-31 DIAGNOSIS — S09.90XA UNSPECIFIED INJURY OF HEAD, INITIAL ENCOUNTER: Primary | ICD-10-CM

## 2023-05-31 DIAGNOSIS — S09.90XA UNSPECIFIED INJURY OF HEAD, INITIAL ENCOUNTER: ICD-10-CM

## 2023-05-31 PROCEDURE — 70450 CT HEAD/BRAIN W/O DYE: CPT

## 2023-06-13 ENCOUNTER — TRANSCRIBE ORDERS (OUTPATIENT)
Dept: PHYSICAL MEDICINE AND REHAB | Facility: REHABILITATION | Age: 48
End: 2023-06-13

## 2023-06-13 DIAGNOSIS — S06.0X0S CONCUSSION WITHOUT LOSS OF CONSCIOUSNESS, SEQUELA (CMS/HCC): Primary | ICD-10-CM

## 2023-06-13 DIAGNOSIS — H53.149 VISUAL DISCOMFORT, UNSPECIFIED: ICD-10-CM

## 2023-06-13 DIAGNOSIS — R51.9 HEADACHE: ICD-10-CM

## 2023-06-13 DIAGNOSIS — M54.2 CERVICALGIA: ICD-10-CM

## 2023-06-21 ENCOUNTER — HOSPITAL ENCOUNTER (OUTPATIENT)
Dept: OCCUPATIONAL THERAPY | Facility: REHABILITATION | Age: 48
Setting detail: THERAPIES SERIES
Discharge: HOME | End: 2023-06-21
Attending: STUDENT IN AN ORGANIZED HEALTH CARE EDUCATION/TRAINING PROGRAM
Payer: COMMERCIAL

## 2023-06-21 DIAGNOSIS — M54.2 CERVICALGIA: ICD-10-CM

## 2023-06-21 DIAGNOSIS — S06.0X0S CONCUSSION WITHOUT LOSS OF CONSCIOUSNESS, SEQUELA (CMS/HCC): ICD-10-CM

## 2023-06-21 DIAGNOSIS — R51.9 HEADACHE: ICD-10-CM

## 2023-06-21 DIAGNOSIS — H54.7 FUNCTIONAL VISION PROBLEM: Primary | ICD-10-CM

## 2023-06-21 DIAGNOSIS — H53.149 VISUAL DISCOMFORT, UNSPECIFIED: ICD-10-CM

## 2023-06-21 PROBLEM — S70.01XA CONTUSION OF RIGHT HIP: Status: ACTIVE | Noted: 2023-06-21

## 2023-06-21 PROBLEM — S06.0XAA BRAIN CONCUSSION: Status: ACTIVE | Noted: 2023-05-28

## 2023-06-21 PROCEDURE — 97535 SELF CARE MNGMENT TRAINING: CPT | Mod: GO

## 2023-06-21 PROCEDURE — 97166 OT EVAL MOD COMPLEX 45 MIN: CPT | Mod: GO

## 2023-06-21 NOTE — PROGRESS NOTES
Occupational Therapy Evaluation    [facility] Fax: [phone]    OT EVALUATION FOR OUTPATIENT THERAPY    Patient: Chel Price   MRN: [id number]  : 1975 47 y.o.    Referring Physician: Colin Jama MD  Date of Visit: 2023    Certification Dates:  23 through 23    Recommended Frequency & Duration:  1 time/week for up to 8 weeks        Diagnosis:   1. Functional vision problem    2. Concussion without loss of consciousness, sequela (CMS/HCC)    3. Cervicalgia    4. Headache    5. Visual discomfort, unspecified          Chief Complaints:   Chief Complaint   Patient presents with   • Visual Deficits       Precautions:      Past Medical History:   Past Medical History:   Diagnosis Date   • Asthma    • Migraines    • Perioral dermatitis        Past Surgical History:   Past Surgical History:   Procedure Laterality Date   • CHOLECYSTECTOMY         LEARNING ASSESSMENT    Assessment completed: Yes    Learner name:  Chel Price     Learner: Patient    Learning Barriers:  Learning barriers: No Barriers    Preferred Language: English     Needed: No    Education Provided:   Method: Discussion  Readiness: acceptance  Response: Demonstrated understanding      CO-LEARNER ASSESSMENT:    Completed: No          Welcome letter discussed: Yes Patient provided with Welcome Letter, which includes attendance policy. Provided education regarding cancellation and no-show policy. Education regarding the importance of participation and regular attendance to maximize goal attainment.       OBJECTIVE MEASUREMENTS/DATA:    Time In Session:  Start Time: 0800  Stop Time: 0855  Time Calculation (min): 55 min   Assessment - 23 0807        Assessment    Plan of Care reviewed and patient/family in agreement Yes     System Pathology/Pathophysiology Noted neuromuscular     Functional Limitations in Following Categories work;home management;community/leisure     Problem List: Occupational Therapy  functional vision     Actions taken Physcial therapy eval for vestibular evaluation on 7/5     Rehab Potential/Prognosis: Occupational Therapy good, to achieve stated therapy goals     Clinical Assessment Chel Price is a 48 y/o female who presents to OP OT following concussion on 5/28 for initial evaluation to address ongoing PCS. 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 Pt reports she can only tolerate 30 minutes of computer use as this time and is experiencing inc in fatigue impacting her ability to complete IADL tasks and particiapte in leisure/exercise. Pt scores 29/64 on [place] PCS Questionnaire with reports of severe fatigue, moderate headaches, nausea, cognitive changes, light and sound sensitivites. Objectively, pt demosntrates poor ocular motor control and motor planning, discomfort with superior and inferior gaze fixations, impaired saccadic fixation as evidenced by a Gerber Devick Score of 60s (norm=52), horizontal saccades screening avg 7.2 s and vertical saccades 5.4 s (norm =4.5-5.5 s). Pt is WNL for covergence and accommodation however notes strain, ARTEAGA provoked following assessment. Occupational Therapy services are warranted at this timeto address the aforementioned deficits impacting patients ability to return to work full time and engage in IADL and preferred leisure tasks.     Plan and Recommendations Initiate OP OT POC 1x week for 6-8 weeks with focus on functional vision including saccadic fixation, ocular motor control, gaze fixations, computer use and multi-stim habituation with goal of return to work full time.     Planned Services CPT 27086 Neuromuscular Reeducation;CPT 68817 Self-care/Home management training;CPT 26450 Sensory integration;CPT 78649 Therapeutic exercises;CPT 89028 Therapeutic activities;CPT 48667 Hot/Cold Packs therapy     Comments/Additional Services BITS, CPT, VTS4, Dynavision                General Information - 06/21/23 0807        Session Details     "Document Type initial evaluation     Mode of Treatment individual therapy        General Information    Onset of Illness/Injury or Date of Surgery 05/28/23     Referring Physician Schupp     History of present illness/functional impairment Chel Price is a 46 y/o gentlelady with a history of anxiety, ADHD, who presents to concussion clinic for evaluation of an injury sustained on 5/28/23.   She stood up suddenly while at the playground with her child on 5/28/23 and hit the top of her head on playground equipment. There was no loss of consciousness. She developed headache and nausea the next day suddenly. She saw her PCP and was referred for a CT scan of the head which was unremarkable for acute intracranial pathology. She developed worsening symptoms and took time off of work. She returned to work and did notice worsening light sensitivity, screen intolerance, nausea, and feeling worse the following day. She is typically very active, walking a lot during the day and has been hesitant to return to her usual activity. She does have a history of migraine and sees Dr. Fox. She takes Emgality and Ubrelvy. She has headaches and neck pain now which is different than her usual headache. This left sided neck pain is described as a tightness and ache and her head feels \"fuzzy\" with tingling and zapping sensations on the left side of her head. She does have some nausea with this type of headache. She takes ibuprofen and has tried topical heat and is has helped. She does have flexeril at home but hasn't used it for neck pain or headache. She notes trouble falling asleep. Uses teas and natural remedies to help with sleep. Has tried melatonin without much effect.  History of anxiety on lexapro, ADHD on Vyvanse 20 mg. Sees NATALYA Lake at [facility]. Feeling executive dysfunction worse over the past two weeks. Still gets her menstrual period, LMP was 5/18/23, utilizing different forms of birth control.   Driving: No " issues.   No prior concussions.  No litigation.  Allergies include penicillins and sulfa Medical history includes asthma, migraines, and perioral dermatitis Her surgical history includes cholecystectomy  Her family history is significant for thyroid disease and hypertension in her mother, and depression in her sister. She is working as a  She does not drink alcohol or use tobacco.         Services    Do You Speak a Language Other Than English at Home? no        Behavioral Health Related Communication    Suicidal Ideation No                Pain/Vitals - 06/21/23 0807        Pain Assessment    Currently in pain Yes     Preferred Pain Scale number (Numeric Rating Pain Scale)     Pain: Body location Head;Neck     Pain Rating (0-10): Pre Activity 2        Pain Interventions    Intervention  rest breaks provided;     Post Intervention Comments unchanged                OT - 06/21/23 0807        Occupational Therapy Encounter Type Details    Occupational Therapy Specialty MTBI OT        OT Frequency and Duration    Frequency of treatment 1 time/week     OT Duration 8 weeks     OT Cert From 06/21/23     OT Cert To 08/20/23     Date OT POC was sent to provider 06/21/23     Signed OT Plan of Care received?  No                Falls/Food Screening - 06/21/23 0807        Initial Falls Assessment    One or more falls in the last year No        Food Insecurity    Within the past 12 months, you worried that your food would run out before you got the money to buy more. Never true     Within the past 12 months, the food you bought just didn't last and you didn't have money to get more. 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 Single mother of one child; child with ASD.               Living Environment    Living Environment - 06/21/23 0807        Living  Environment    Living Arrangements house     Living Environment Comment 12 steps; 4 GREY     Transportation Concerns none               Reading and Writing     Reading and Writing - 06/21/23 0807        Reading and Writing Assessment    Reading (comments) Reading tolerance: no concerns; computer tolerance 30 minutes; then provoked discomfort visually, sensitivities               BADL/IADL    BADL/IADL - 06/21/23 0807        IADL/Home Interventions Assessment    Other (comments) Pt reports inc in fatigue impacting IADL               Work and School     Work and School Assessment - 06/21/23 0807        Work/School/Leisure Assessment    Job Performance (comments) Works in Marketing remote from home- 30 hrs /week     Leisure / Social Participation (comments) spending time with family; hiking     Exercise Assessment (comments) Walking               Vision     Vision - 06/21/23 0807        Vision Assessment    Visual Impairment/Limitations --   contacts for nearsightedness;       Oculomotor Control    Left eye assessed? Yes     Right eye assessed? Yes     Superior assessed? Yes     Inferior assessed? Yes     Diagonal assessed? Yes     Circular assessed? Yes     Left AROM without target ROM Intact     Left oculomotor AROM Discomfort None     Left gaze fixation (10 second hold) Able      Right AROM without target ROM Intact      Right oculomotor AROM Discomfort None     Right gaze fixation (10 second hold) Able     Superior AROM without target ROM Intact      Superior oculomotor AROM Discomfort Mild     Superior gaze fixation (10 second hold) Able     Inferior AROM without target ROM Intact     Inferior oculomotor AROM Discomfort Mild     Inferior gaze fixation (10 second hold) Able     Diagonal AROM without target ROM Intact     Diagonal oculomotor AROM Discomfort None   difficulty with motor planning    Diagonal gaze fixation (10 second hold) Able     Circular AROM without target ROM Intact     Circular oculomotor AROM  "Discomfort None        Eye Teaming    Convergence Impaired   5 inches    Divergence Impaired   9 inches    Accommodation Intact     Smooth Pursuits Intact     3D Tracking Intact     Nystagmus No        Saccadic Fixation Speed    Vertical Saccadic Fixation Impaired     Horizontal Saccadic Fixation Impaired     Horizontal Saccades H1 7.25 Seconds     Horizontal Saccades H2 7.3 Seconds     Horizontal Saccades H3 6.9 Seconds     Horizontal Saccades H4 6.63 Seconds     Horizontal Saccades trials average 7.02 Seconds     Vertical Saccades V1 5.43 Seconds     Vertical Saccades V2 5.31 Seconds     Vertical Saccades V3 5.61 Seconds     Vertical Saccades V4 5.24 Seconds   provoked HA, eye strain, nasuea    Vertical Saccades trials average 5.4 Seconds     Gerber Devick Test #1 (seconds) 22.48 Seconds     Gerber Devick Test #2 (seconds) 19.16 Seconds     Gerber Devick Test #3 (seconds) 17.96 Seconds     Gerber Devick Total Time 59.6 Seconds     Gerber Devick Errors #1 0     Gerber Devick Errors #2 0     Gerber Devick Errors #3 0     Gerber Devick Total Errors 0        Symptoms and Outcome Measures    Symptoms Aural fullness;Tinnitus;Visual changes;Difficulty watching TV;Difficulty using computer;Cognitive Fatigue;Dizziness;Fatigue;Fogginess;Headache;Imbalance;Multi-stimulus intolerance;Nausea;Phonophobia;Photophobia;Sleep disturbance;Slowed processing;Rocking     [place] 29/64                 GOALS:     Goals     •  <enter goal here> (pt-stated)       \"To get back to full time work\"      •  Mutually agreed upon pain goal       Mutually agreed upon pain goal: <2/10 with 60 minutes of computer use      •  OT- PCS       MTBI GOALS  Short Term Goals Time Frame Result Comment   Full ocular motor range of motion and control with mild symptoms 4 weeks New    Convergence less than or equal to 6 inches for near-focus tasks of reading and computer use with mild symptoms 4 weeks New    Demonstrate fair tolerance for normal volume and intensity of " visual stimulation as demonstrated by only mild symptoms after 20 minutes engagement in activity in moderate-high mult-stim environment 4 weeks New    Visual reaction time within or less than 0.75 seconds via Dynavision with mild symptoms 4 weeks New    Saccadic fixation speed of less than 52 seconds as measured by the Gerber Devick Test with mild symptoms 4 weeks New    Visual perceptual skills via MVPT-4 with a standard score equivalent to age range 6 weeks New    Patient will perform IADLs and community activities with mild symptoms as evidenced by a 10 point reduction on the [place] Post Concussion Symptoms Questionnaire 4 weeks New    Patient will be independent with visual home exercise program 4 weeks New        Long Term Goals Time Frame Result Comment   Demonstrate good tolerance for normal volume and intensity of visual stimulation as evidenced by report of absent to manageable symptoms after 60 minutes engagement in activity in moderate-high multi-stim environment 8 weeks New    Patient will complete necessary reading for work/leisure/school, with accommodations if indicated, as evidenced by reported ability to read for 60 minutes, with absent or manageable symptoms 8 weeks New    Patient will utilize computer for work/leisure/school tasks, with accommodations if indicated, as evidenced by reported ability to use computer for 1 hour, with absent or manageable symptoms 8 weeks New    Patient will return to work/school with full or modified duties with absent or manageable symptoms 8 weeks New    Patient will perform IADLs and community activities with manageable symptoms as measured by a 20 point reduction on the [place] Post Concussion Symptoms Questionnaire. 8 weeks New                TREATMENT PLAN:    VISION/CONCUSSION OT FLOW SHEET    OT Vision/mTBI  EXERCISES CURRENT SESSION TIME   NEURO RE-ED  CPT 69450 TOTAL TIME FOR SESSION    Dynavision     VTS3/VTS4     CPT     BITs     NVR     Saccadic  Fixation Assessed; see flowsheet    Ocular Motor Control Assessed; see flowsheet    Visual Tracing     3D Tracking Assessed; see flowsheet    Visual Perception     Convergence/Divergence Assessed; see flowsheet    Accommodation Assessed; see flowsheet    Visual Stimulation Assessed; see flowsheet    THER ACT  CPT 19765 TOTAL TIME FOR SESSION    Pain, Vitals, Meds, Etc. Assessed; monitored, reviewed    HEP Provided and Reviewed:  -ocular ROM, gaze fixations  -saccades  -visual tracing    Visual Endurance      Work/School Simulation      SELF CARE  CPT 62046 TOTAL TIME FOR SESSION 10 Minutes   PCS Symptom Management/Education Pt educated re: Role of OT in therapeutic intervention for PCS, POC, goals and symptom management strategies. Pt educated on results of assessments and how these relate to functional deficit areas. Pt verbalized agreement.    ADL/IADLs     GROUP  CPT 69071 TOTAL TIME FOR SESSION                                                                                         Planned Services: The patient’s treatment will include CPT 86113 Neuromuscular Reeducation, CPT 99739 Self-care/Home management training, CPT 25326 Sensory integration, CPT 32103 Therapeutic exercises, CPT 78515 Therapeutic activities, CPT 87031 Hot/Cold Packs therapy,BITS, CPT, VTS4, Dynavision.    Tiffany Hammonds, OT

## 2023-06-21 NOTE — OP OT TREATMENT LOG
VISION/CONCUSSION OT FLOW SHEET    OT Vision/mTBI  EXERCISES CURRENT SESSION TIME   NEURO RE-ED  CPT 27505 TOTAL TIME FOR SESSION    Dynavision     VTS3/VTS4     CPT     BITs     NVR     Saccadic Fixation Assessed; see flowsheet    Ocular Motor Control Assessed; see flowsheet    Visual Tracing     3D Tracking Assessed; see flowsheet    Visual Perception     Convergence/Divergence Assessed; see flowsheet    Accommodation Assessed; see flowsheet    Visual Stimulation Assessed; see flowsheet    THER ACT  CPT 39207 TOTAL TIME FOR SESSION    Pain, Vitals, Meds, Etc. Assessed; monitored, reviewed    HEP Provided and Reviewed:  -ocular ROM, gaze fixations  -saccades  -visual tracing    Visual Endurance      Work/School Simulation      SELF CARE  CPT 77411 TOTAL TIME FOR SESSION 10 Minutes   PCS Symptom Management/Education Pt educated re: Role of OT in therapeutic intervention for PCS, POC, goals and symptom management strategies. Pt educated on results of assessments and how these relate to functional deficit areas. Pt verbalized agreement.    ADL/IADLs     GROUP  CPT 96518 TOTAL TIME FOR SESSION

## 2023-06-29 ENCOUNTER — HOSPITAL ENCOUNTER (OUTPATIENT)
Dept: PHYSICAL THERAPY | Facility: REHABILITATION | Age: 48
Setting detail: THERAPIES SERIES
Discharge: HOME | End: 2023-06-29
Attending: STUDENT IN AN ORGANIZED HEALTH CARE EDUCATION/TRAINING PROGRAM
Payer: COMMERCIAL

## 2023-06-29 DIAGNOSIS — M54.2 CERVICALGIA: ICD-10-CM

## 2023-06-29 DIAGNOSIS — H53.149 VISUAL DISCOMFORT, UNSPECIFIED: ICD-10-CM

## 2023-06-29 DIAGNOSIS — R51.9 HEADACHE: ICD-10-CM

## 2023-06-29 DIAGNOSIS — S06.0X0S CONCUSSION WITHOUT LOSS OF CONSCIOUSNESS, SEQUELA (CMS/HCC): ICD-10-CM

## 2023-06-29 PROBLEM — S06.0X0A CONCUSSION WITH NO LOSS OF CONSCIOUSNESS: Status: ACTIVE | Noted: 2023-05-28

## 2023-06-29 PROCEDURE — 97162 PT EVAL MOD COMPLEX 30 MIN: CPT | Mod: GP

## 2023-06-29 PROCEDURE — 97530 THERAPEUTIC ACTIVITIES: CPT | Mod: GP

## 2023-06-29 PROCEDURE — 97140 MANUAL THERAPY 1/> REGIONS: CPT | Mod: GP

## 2023-06-30 ENCOUNTER — HOSPITAL ENCOUNTER (OUTPATIENT)
Dept: OCCUPATIONAL THERAPY | Facility: REHABILITATION | Age: 48
Setting detail: THERAPIES SERIES
Discharge: HOME | End: 2023-06-30
Attending: STUDENT IN AN ORGANIZED HEALTH CARE EDUCATION/TRAINING PROGRAM
Payer: COMMERCIAL

## 2023-06-30 DIAGNOSIS — S06.0X0S CONCUSSION WITHOUT LOSS OF CONSCIOUSNESS, SEQUELA (CMS/HCC): Primary | ICD-10-CM

## 2023-06-30 DIAGNOSIS — H54.7 FUNCTIONAL VISION PROBLEM: ICD-10-CM

## 2023-06-30 DIAGNOSIS — M54.2 CERVICALGIA: ICD-10-CM

## 2023-06-30 PROCEDURE — 97535 SELF CARE MNGMENT TRAINING: CPT | Mod: GO

## 2023-06-30 PROCEDURE — 97112 NEUROMUSCULAR REEDUCATION: CPT | Mod: GO

## 2023-06-30 PROCEDURE — 97530 THERAPEUTIC ACTIVITIES: CPT | Mod: GO

## 2023-06-30 NOTE — OP PT TREATMENT LOG
VESTIBULAR PT FLOWSHEET    P.T. TREATMENT LOG   Precautions:   Eval Date: 6/29/23 Progress Note: q10v/30d   POC expires: 6/30/23 Insurance Limits: BOMN   Treatment Current Session Time   CANALITH  REPOSITIONING TREATMENT  (CPT 22349) Y/N Notes Not performed   CRT      NEURO RE-ED  (CPT 49746) Y/N Notes Not performed   BPPV ASSESSMENT     VOR CANCELLATION                      Standing H/VVOR-C     Ambulation w/ H/VVOR-C     VOR / GAZE STABILITY     Standing H/VVOR     Ambulation w/H/VVOR     H/VVOR on compliant surfaces     Functional VOR     HABITUATION     Ball circles     Repetitive functional movements     BALANCE- STATIC     On floor     Airex foam     Rockerboard     BALANCE- DYNAMIC     Ambulation-head turns/nods     Ambulation - 180 & 360 degree turns     Retro ambulation EO     Ambulation with EC     Obstacles     Tandem     C/S KINESTHESIA/ COORDINATION     MULTITASKING     OKS     *Objective Measures      THER-EX   (CPT 36378) Y/N SETS REPS time 0-7 Minutes   STRETCHING        Chin tucks (supine, seated) yes   2 10s    Upper Trapezius Stretch yes   2 20s    Levator Scapula stretch yes   2 20s    Scalene/SCM stretch        Doorway Pectoralis stretch   Supine shoulder to table   yes       2   10s    Open Book/ thread the needle thoracic rotation stretches                ROM        SNAGS for rotation        SNAGS for extension        Supine snow angels        Seated thoracic extension                STRENGTHENING        Cervical Stabilization        Cervical isometrics (c/s rot, flex, ext, SB, capital flex)        Resisted chin tucks w/ TB        Cervical retractions prone on elbows        Deep cervical flexor strengthening        Supine Chin tuck yes  5 5s    Scapular Stabilization        Supine scap retraction /depression is to mat yes  5 5s    Seated scap retraction/depression        No money (B/L UE ER w/ TB)        TB rows/overhead rows        TB shoulder extension         TB shoulder horiz ABD         Standing chin tuck with shoulder flexion/abduction        W to Y's (supine or standing)        Prone T, Y, I         Modified supermans                CARDIOVASCULAR     UBE     Recumbent bike     Treadmill/BCTT     MANUAL  (CPT 60659) Y/N  8-22 Minutes   STM/MFR/TPR/SOT yes SO, UT, LS,    IASTM     Joint Mobilizations     Manual stretching  Contract relax for rotation Yes  yes SO, UT,LS    E-STIM UNATTENDED  (CPT ) Y/N  Not performed   TENS/ H-wave      HOT / COLD PACKS  (CPT 03664) Y/N  0-7 Minutes   Heat     Ice     THER ACT  (CPT 93140) Y/N  8-22 Minutes   Review pain, meds, falls, vitals, symptoms yes    *Subjective Measures    yes NDI: 36% see media for details   Patient Education/HEP yes 6/29/23: Pt educated re: plan of care , posture , body mechanics, ergonomics, importance of rest/sleep, gentle exercise, hydration, stress reduction,  gradual incremental return to prior level of function.   Patient verbalized and demonstrated understanding of education provided.

## 2023-06-30 NOTE — PROGRESS NOTES
"Occupational Therapy Visit    OT DAILY NOTE FOR OUTPATIENT THERAPY    Patient: Chel Price   MRN: [id number]  : 1975 47 y.o.  Referring Physician: Colin Jama MD  Date of Visit: 2023      Certification Dates: 23 through 23    Diagnosis:   1. Concussion without loss of consciousness, sequela (CMS/HCC)    2. Functional vision problem    3. Cervicalgia          TODAY'S VISIT    Time In Session: 12:05 - 13:00      History/Vitals/Pain/Encounter Info - 23 1207        Injury History/Precautions/Daily Required Info    Document Type daily treatment     Onset of Illness/Injury or Date of Surgery 23     Referring Physician Wiley     History of present illness/functional impairment Chel Price is a 46 y/o gentlelady with a history of anxiety, ADHD, who presents to concussion clinic for evaluation of an injury sustained on 23.   She stood up suddenly while at the playground with her child on 23 and hit the top of her head on playground equipment. There was no loss of consciousness. She developed headache and nausea the next day suddenly. She saw her PCP and was referred for a CT scan of the head which was unremarkable for acute intracranial pathology. She developed worsening symptoms and took time off of work. She returned to work and did notice worsening light sensitivity, screen intolerance, nausea, and feeling worse the following day. She is typically very active, walking a lot during the day and has been hesitant to return to her usual activity. She does have a history of migraine and sees Dr. Fox. She takes Emgality and Ubrelvy. She has headaches and neck pain now which is different than her usual headache. This left sided neck pain is described as a tightness and ache and her head feels \"fuzzy\" with tingling and zapping sensations on the left side of her head. She does have some nausea with this type of headache. She takes ibuprofen and has tried topical heat and " is has helped. She does have flexeril at home but hasn't used it for neck pain or headache. She notes trouble falling asleep. Uses teas and natural remedies to help with sleep. Has tried melatonin without much effect.  History of anxiety on lexapro, ADHD on Vyvanse 20 mg. Sees NATALYA [facility] at [facility]. Feeling executive dysfunction worse over the past two weeks. Still gets her menstrual period, LMP was 5/18/23, utilizing different forms of birth control.   Driving: No issues.   No prior concussions.  No litigation.  Allergies include penicillins and sulfa Medical history includes asthma, migraines, and perioral dermatitis Her surgical history includes cholecystectomy  Her family history is significant for thyroid disease and hypertension in her mother, and depression in her sister. She is working as a  She does not drink alcohol or use tobacco.     Patient/Family/Caregiver Comments/Observations Arrives for OT on time; no new complaints.     Patient reported fall since last visit No        Pain Assessment    Currently in pain Yes     Preferred Pain Scale number (Numeric Rating Pain Scale)     Pain Side/Orientation bilateral     Pain: Body location Head     Pain Rating (0-10): Pre Activity 3   Mild wooziness    Pain Rating (0-10): Activity 4   mild/moderate wooziness    Pain Rating (0-10): Post Activity 3        Pain Interventions    Intervention  Rest breaks provided; monitored throughout session     Post Intervention Comments See post-pain rating         Services    Do You Speak a Language Other Than English at Home? no                Daily Treatment Assessment and Plan - 06/30/23 1207        Daily Treatment Assessment and Plan    Progress toward goals Progressing     Daily Outcome Summary Follow-up appointment w/ Dr. Jama on Friday. Bilateral pupil dilation observed this visit - patient reports this is baseline. Obtained baseline visual-motor reaction time today; Dynavision  B 5 Second Red = 0.70', which is considered within functional limits. Mild post-concussive symptoms (eye strain and wooziness) provoked w/ basic functional skill training during session, although symptoms remained sub-threshold throughout. Continues to work 3 hours/day, 5 days/week from home w/ good tolerance w/ rest breaks     Plan and Recommendations Initiate OP OT POC 1x week for 6-8 weeks with focus on functional vision including saccadic fixation, ocular motor control, gaze fixations, computer use and multi-stim habituation with goal of return to work full time.                     OBJECTIVE DATA TAKEN TODAY    None Taken    Today's Treatment:    VISION/CONCUSSION OT FLOW SHEET    OT Vision/mTBI  EXERCISES CURRENT SESSION TIME   NEURO RE-ED  CPT 92303 TOTAL TIME FOR SESSION 35 minutes    Dynavision     VTS3/VTS4     CPT     BITs     NVR     Saccadic Fixation Binocular fine saccadic movements w/ [place] Tracking task x 6 paragraphs w/ times as follows - 43 seconds, 40 seconds, 37 seconds, 38 seconds, 36 seconds and 37 seconds. Mild wooziness and eye strain reported post-task       Combined rotational saccades w/ added vestibular component to perform R/L lateral head turns between 'Word-a-Round' cards followed by alphabetizing solutions on tabletop, which required transitioning between front/back of paper; completed x cards for a total of 24 words. Mild eye strain and increase in headache reported; 100% accuracy w/ task     Ocular Motor Control Close-focus tabletop activity to promote sustained vergence, saccadic fixation, visual scanning and sequencing skills w/ 'Day 3- exercise 22'  coordinates activity. Completed w/ 3 errors - able to self-correct; mild increase in headache reported       Visual Tracing     3D Tracking     Visual Perception     Convergence/Divergence     Accommodation     Visual Stimulation     THER ACT  CPT 71148 TOTAL TIME FOR SESSION 10 minutes   Assessment Obtained baseline Dynavision  scores - see note for details    Pain, Vitals, Meds, Etc. Assessed; monitored, reviewed    HEP CURRENT HEP =     - Ocular ROM and Gaze Fixations  - Saccades  - Visual Tracing    Visual Endurance      Work/School Simulation      SELF CARE  CPT 37716 TOTAL TIME FOR SESSION 10 minutes   PCS Symptom Management/Education Reinforced education re: OT role s/p mTBI to address functional visual system, importance of balancing provocation of symptoms w/ rest and recovery. Discussed nature of mTBI and impact on sympathetic nervous system regulation w/ emphasis on energy conservation technique implementation. Patient verbalizes good understanding of all education provided.     ADL/IADLs     GROUP  CPT 35389 TOTAL TIME FOR SESSION

## 2023-06-30 NOTE — OP OT TREATMENT LOG
VISION/CONCUSSION OT FLOW SHEET    OT Vision/mTBI  EXERCISES CURRENT SESSION TIME   NEURO RE-ED  CPT 17524 TOTAL TIME FOR SESSION 35 minutes    Dynavision     VTS3/VTS4     CPT     BITs     NVR     Saccadic Fixation Binocular fine saccadic movements w/ [place] Tracking task x 6 paragraphs w/ times as follows - 43 seconds, 40 seconds, 37 seconds, 38 seconds, 36 seconds and 37 seconds. Mild wooziness and eye strain reported post-task       Combined rotational saccades w/ added vestibular component to perform R/L lateral head turns between 'Word-a-Round' cards followed by alphabetizing solutions on tabletop, which required transitioning between front/back of paper; completed x cards for a total of 24 words. Mild eye strain and increase in headache reported; 100% accuracy w/ task     Ocular Motor Control Close-focus tabletop activity to promote sustained vergence, saccadic fixation, visual scanning and sequencing skills w/ 'Day 3- exercise 22'  coordinates activity. Completed w/ 3 errors - able to self-correct; mild increase in headache reported       Visual Tracing     3D Tracking     Visual Perception     Convergence/Divergence     Accommodation     Visual Stimulation     THER ACT  CPT 42646 TOTAL TIME FOR SESSION 10 minutes   Assessment Obtained baseline Dynavision scores - see note for details    Pain, Vitals, Meds, Etc. Assessed; monitored, reviewed    HEP CURRENT HEP =     - Ocular ROM and Gaze Fixations  - Saccades  - Visual Tracing    Visual Endurance      Work/School Simulation      SELF CARE  CPT 71746 TOTAL TIME FOR SESSION 10 minutes   PCS Symptom Management/Education Reinforced education re: OT role s/p mTBI to address functional visual system, importance of balancing provocation of symptoms w/ rest and recovery. Discussed nature of mTBI and impact on sympathetic nervous system regulation w/ emphasis on energy conservation technique implementation. Patient verbalizes good understanding of all education  provided.     ADL/IADLs     GROUP  CPT 77040 TOTAL TIME FOR SESSION

## 2023-07-01 NOTE — PROGRESS NOTES
Physical Therapy Evaluation    Neuro [facility] Therapy Fax: [phone]    PT EVALUATION FOR OUTPATIENT THERAPY    Patient: Chel Price    MRN: [id number]  : 1975 47 y.o.     Referring Physician: Colin Jama MD  Date of Visit: 2023      Certification Dates:  23 through 23         Recommended Frequency & Duration:  2 times/week for up to 3 months     Diagnosis:   1. Cervicalgia    2. Concussion without loss of consciousness, sequela (CMS/HCC)    3. Headache    4. Visual discomfort, unspecified        Chief Complaints:   Chief Complaint   Patient presents with   • Pain   • Headache   • Fatigue       Precautions:    Precautions additional comments:      Past Medical History:   Past Medical History:   Diagnosis Date   • Asthma    • Migraines    • Perioral dermatitis        Past Surgical History:   Past Surgical History:   Procedure Laterality Date   • CHOLECYSTECTOMY           LEARNING ASSESSMENT    Assessment completed:  Yes    Learner name:  Chel    Learner: Patient    Learning Barriers:  Learning barriers: No Barriers    Preferred Language: English     Needed: No    Education Provided:   Method: Discussion and Demonstration  Readiness: acceptance  Response: Demonstrated understanding, Needs reinforcement and Verbalizes understanding      CO-LEARNER ASSESSMENT:    Completed: No          Welcome letter discussed: Yes Patient provided with Welcome Letter, which includes attendance policy. Provided education regarding cancellation and no-show policy. Education regarding the importance of participation and regular attendance to maximize goal attainment.         OBJECTIVE MEASUREMENTS/DATA:    Time In Session:  Start Time: 0900  Stop Time: 1000  Time Calculation (min): 60 min   Assessment and Plan - 23 1000        Assessment    Plan of Care reviewed and patient/family in agreement Yes     System Pathology/Pathophysiology Noted vestibular;musculoskeletal     Functional  Limitations in Following Categories (PT Eval) self-care;home management;work;community/leisure     Rehab Potential/Prognosis good, to achieve stated therapy goals;re-evaluate goals as necessary;adequate, monitor progress closely     Problem List abnormal muscle tone;decreased flexibility;decreased strength;dizziness;gaze stabilization;impaired balance;motion sensitivity;visual motion intolerance;decreased ROM     Clinical Assessment Pt is a 48 yo female with diagnosis of PPCS and cervicalgia. The patient's chief c/o today was of cervical pain and stiffness. The session was spent primarily on cervical evaluation and treatment with education on proper posture, body mechanics and ergonomics. Pt verbalized and demonstrated understanding of the education provided. Pt will benefit from continue Cervical PT and vestibular eval and treat as indicated.     Plan and Recommendations Continue flexibility and strength training for cervical spine. When c-spine will tolerate and if indicated , performVestibular ocular motor screen, Video fenzel assessment, Positional testing, Static balance measures, SOT, FGA, MSQ, GST, DVA, HSSOT, static and dynamic balance training, vor adaptation/gaze stabilization training, habituation training, cardiovascular training.     Planned Services CPT 73780 Canalith repositioning procedure/maneuvers;CPT 95284 Gait training;CPT 89817 Manual therapy;CPT 70531 Neuromuscular Reeducation;CPT 22419 Therapeutic exercises;CPT 24442 Therapeutic activities;CPT 30327 Self-care/Home management training;CPT 65087 Electrical stimulation ATTENDED;CPT 53522 Electrical stimulation UNATTENDED;CPT 45330 Hot/Cold Packs therapy                General Information - 06/29/23 0931        Session Details    Document Type initial evaluation        General Information    Onset of Illness/Injury or Date of Surgery 05/28/23     Referring Physician Dr. Jama     History of present illness/functional impairment Suddenly when  standing up, hit top of head on metal playground bar. Date of Occurence date of occurence: 05/28/2023. Loss of Consciousness none. Associated Hospitalizations none. Imaging CT scan; Done 5/31/23. Associated Injuries none. Post Traumatic Amnesia absent. Therapy History ; None. Associated Symptoms headaches, sensitivity to sensory stimulation; Nausea, neck pain. Work History ; Working 6 hr/day remotely x 8 years. Took two weeks off of work. Went back to work 6/12/23, and has been trying to avoid screen time. Started work at 9 am, by 11 am felt nauseated, had been taking breaks. Got through full day of work, felt worse at the end of the day     Patient/Family/Caregiver Comments/Observations Primary c/o feel like head is in a block and wooziness and no longer nauseated. Feel stiffness in the neck and getting 3 headaches a day and getting achiness in other areas.                Pain/Vitals - 06/29/23 0931        Pain Assessment    Currently in pain Yes     Pain: Body location Head     Pain Rating (0-10): Pre Activity 2        Pain Intervention    Intervention  Moist Heat, Manual therapy, therex , education     Post Intervention Comments Paing down to 1/10                  PT - 06/29/23 0931        Physical Therapy    Physical Therapy Specialty [facility] PT        PT Plan    Frequency of treatment 2 times/week     PT Duration 3 months     PT Cert From 06/29/23     PT Cert To 09/27/23     Signed PT Plan of Care received?  No               NDI: 36%   ROM    Range of Motion - 06/29/23 0900        Cervical AROM    Cervical Flexion 48     Cervical Extension 53     Cervical Left SB 25     Cervical Right SB 30     Cervical Left Rotation 40     Cervical Right Rotation 55                  Goals     • PT cervical and vestiibular goals      Short Term Goals Time Frame Result Comment/Progress   Pt will improve score on the NDI to at least 5 points lower than baseline assessment inc ability to perform ADLs and functional tasks with dec  limitations from c-spine. 6wk     Pt will report no greater than 3/10 pain in neck at rest inc tolerance for ADLs.  6wk     Pt will inc c-spine AROM to  45 deg flexion, 50 deg extension, 40 deg SB, 65 deg Rot. 6wk     Pt will report dec HA to no greater than 4 within the past week to improve tolerance for ADLs. 6wk     Pt will be independent with established HEP 6wk     Pt will report c-spine pain no greater than 2-3/10 on average to improve ability to perform ADLs and functional mobility.    6wk        Long Term Goals Time Frame Result Comment/Progress   Pt will dec score on the NDI to <15 indicating no greater than a mild disability and improved ability to perform ADLs and functional tasks with decreased limitations from neck. 12wk     Pt will report no c-spine pain at rest and no greater than 1/10 pain with activity to inc tolerance for ADLs.   12wk     Pt will inc c-spine AROM to WNL 50 deg flexion, 60 deg extension, 45 deg SB, 75 deg rotaion to improve ability to improve ADLs.  12wk     Pt will report HA no greater than 2 over the past week to improve tolerance for ADLs. 12wk     Pt will be independent with postural correction for pain management and inc tolerance for activities in sitting and standing.      Pt will be independent with established HEP      Resume ADLs to 90% capacity without increase in neck pain. 12wk                     Goals Short-Long Time Frame       Result Comment   SHORT TERM GOALS       1. Romberg, Sharpened Romberg and Single Limb Stance TBA with eyes opened and closed for postural stability Short Term 4-6  weeks      2. Patient's motion sensitivity quotient (MSQ) TBA to determine functional movement tolerance. Short Term 4-6 weeks     3. Patient's Balance Master SOT score TBA to objectively assess equilibrium and postural control (score < 38 increases likelihood ratio of falls; MDC 8 points) Short Term 4-6 weeks     4. FGA TBA to measure gait stability and fall risk. Short Term 4-6  weeks     5. DVA TBA to objectively determine functional visual acuity with head movement. Short Term 4-6 weeks     6. GST (Gaze Stability Test) TBA to determine functional level gaze stability. Short Term 4-6 weeks     7. Patient will perform home exercise program with supervision and cues. Short Term 4-6 weeks     8. Patient will tolerate 10 minutes of cardiovascular conditioning exercise with vital signs stable  Short Term 4-6 weeks     9. Patient will have negative BPPV all canals for symptom-free functional mobility. Short Term 4-6 weeks     LONG TERM GOALS       1. Patient will improve Static Romberg, Sharpened Romberg and SLS to WNL for age with eyes opened and eyes closed for improved postural stability  Long Term 8-12 weeks      1. Patient will decrease motion sensitivity quotient to <2 for increased tolerance to functional movement. (2% or less =WNL)   Long Term 8-12 weeks     2. Patient will increase Balance Manager SOT score to WNL for improved equilibrium and postural control (score < 38 increases likelihood ratio of falls; MDC 8 points) Long Term 8-12 weeks     3. Patient will improve score on HSSSOT to WNL ( >70% Ratio head turn/No head turn equilibrium with eyes closed) to demonstrate improved equilibrium with head movement. Long Term 8-12  weels     4. Patient will increase FGA score to >/= 24/30     for maximal gait stability, safety and functional mobility. (fall risk </=22/30; MDC 6 points for vestibular diagnoses) Long Term 8-12 weeks     5. Patient will improve DVA to </=   2    line difference for functional level visual acuity with head movement.  (Norm= 2 lines or less) Long Term 8-12 weeks     6. Patient will achieve   WNL   on GST (Gaze Stability Test) for functional/high level gaze stability. Long Term 8-12 weeks     7. Pt will be independent with HEP Long Term 8-12 weeks     8. Patient will tolerate 15 minutes of cardiovascular conditioning exercise with vital signs stable and no  abnormal symptoms. Long Term 8-12 weeks     9. Patient will have negative BPPV all canals for symptom-free functional mobility. Long Term 8-12 weeks     10. Patient will return to household, community, and recreational activities of daily living.  Long Term 8-12 weeks     11. Patient will improve outcome measure   ABC > 80%, DHI  <10%    reflecting decreased symptoms and improved participation in functional activity.  ABC:(>/= 80% indicative of increased function and decreased fall risk)  DHI: (18 points= MCID) Long Term 8-12 weeks     12.Patient will demonstrate independence with managing any residual symptoms. Long Term 8-12  weeks     13. Patient will have no abnormal symptoms with challenging VOR activities to allow symptom free high level functional activity. Long Term 8-12 weeks                     TREATMENT PLAN:    VESTIBULAR PT FLOWSHEET    P.T. TREATMENT LOG   Precautions:   Eval Date: 6/29/23 Progress Note: q10v/30d   POC expires: 6/30/23 Insurance Limits: BOMN   Treatment Current Session Time   CANALITH  REPOSITIONING TREATMENT  (CPT 55933) Y/N Notes Not performed   CRT      NEURO RE-ED  (CPT 53597) Y/N Notes Not performed   BPPV ASSESSMENT     VOR CANCELLATION                      Standing H/VVOR-C     Ambulation w/ H/VVOR-C     VOR / GAZE STABILITY     Standing H/VVOR     Ambulation w/H/VVOR     H/VVOR on compliant surfaces     Functional VOR     HABITUATION     Ball circles     Repetitive functional movements     BALANCE- STATIC     On floor     Airex foam     Rockerboard     BALANCE- DYNAMIC     Ambulation-head turns/nods     Ambulation - 180 & 360 degree turns     Retro ambulation EO     Ambulation with EC     Obstacles     Tandem     C/S KINESTHESIA/ COORDINATION     MULTITASKING     OKS     *Objective Measures      THER-EX   (CPT 79034) Y/N SETS REPS time 0-7 Minutes   STRETCHING        Chin tucks (supine, seated) yes   2 10s    Upper Trapezius Stretch yes   2 20s    Levator Scapula stretch yes    2 20s    Scalene/SCM stretch        Doorway Pectoralis stretch   Supine shoulder to table   yes       2   10s    Open Book/ thread the needle thoracic rotation stretches                ROM        SNAGS for rotation        SNAGS for extension        Supine snow angels        Seated thoracic extension                STRENGTHENING        Cervical Stabilization        Cervical isometrics (c/s rot, flex, ext, SB, capital flex)        Resisted chin tucks w/ TB        Cervical retractions prone on elbows        Deep cervical flexor strengthening        Supine Chin tuck yes  5 5s    Scapular Stabilization        Supine scap retraction /depression is to mat yes  5 5s    Seated scap retraction/depression        No money (B/L UE ER w/ TB)        TB rows/overhead rows        TB shoulder extension         TB shoulder horiz ABD        Standing chin tuck with shoulder flexion/abduction        W to Y's (supine or standing)        Prone T, Y, I         Modified supermans                CARDIOVASCULAR     UBE     Recumbent bike     Treadmill/BCTT     MANUAL  (CPT 00038) Y/N  8-22 Minutes   STM/MFR/TPR/SOT yes SO, UT, LS,    IASTM     Joint Mobilizations     Manual stretching  Contract relax for rotation Yes  yes SO, UT,LS    E-STIM UNATTENDED  (CPT ) Y/N  Not performed   TENS/ H-wave      HOT / COLD PACKS  (CPT 83573) Y/N  0-7 Minutes   Heat     Ice     THER ACT  (CPT 24799) Y/N  8-22 Minutes   Review pain, meds, falls, vitals, symptoms yes    *Subjective Measures    yes NDI: 36% see media for details   Patient Education/HEP yes 6/29/23: Pt educated re: plan of care , posture , body mechanics, ergonomics, importance of rest/sleep, gentle exercise, hydration, stress reduction,  gradual incremental return to prior level of function.   Patient verbalized and demonstrated understanding of education provided.       ASSESSMENT:  Clinical Assessment Pt is a 46 yo female with diagnosis of PPCS and cervicalgia. The patient's chief c/o  today was of cervical pain and stiffness. The session was spent primarily on cervical evaluation and treatment with education on proper posture, body mechanics and ergonomics. Pt verbalized and demonstrated understanding of the education provided. Pt will benefit from continue Cervical PT and vestibular eval and treat as indicated.    Plan and Recommendations Continue flexibility and strength training for cervical spine. When c-spine will tolerate and if indicated , performVestibular ocular motor screen, Video fenzel assessment, Positional testing, Static balance measures, SOT, FGA, MSQ, GST, DVA, HSSOT, static and dynamic balance training, vor adaptation/gaze stabilization training, habituation training, cardiovascular training.        This 47 y.o. year old female presents to PT with above stated diagnosis. Physical Therapy evaluation reveals abnormal muscle tone, decreased flexibility, decreased strength, dizziness, gaze stabilization, impaired balance, motion sensitivity, visual motion intolerance, decreased ROM resulting in self-care, home management, work, community/leisure limitations. Chel Price will benefit from skilled PT services to address limitation, work towards rehab and patient goals and maximize return to PLOF of chosen ADLs.     Planned Services: The patient's treatment will include CPT 92933 Canalith repositioning procedure/maneuvers, CPT 97599 Gait training, CPT 01482 Manual therapy, CPT 23416 Neuromuscular Reeducation, CPT 92429 Therapeutic exercises, CPT 94204 Therapeutic activities, CPT 06960 Self-care/Home management training, CPT 06326 Electrical stimulation ATTENDED, CPT 47784 Electrical stimulation UNATTENDED, CPT 37433 Hot/Cold Packs therapy, .     Michael Dunlap, PT

## 2023-07-05 ENCOUNTER — HOSPITAL ENCOUNTER (OUTPATIENT)
Dept: PHYSICAL THERAPY | Facility: REHABILITATION | Age: 48
Setting detail: THERAPIES SERIES
Discharge: HOME | End: 2023-07-05
Attending: STUDENT IN AN ORGANIZED HEALTH CARE EDUCATION/TRAINING PROGRAM
Payer: COMMERCIAL

## 2023-07-05 DIAGNOSIS — M54.2 CERVICALGIA: ICD-10-CM

## 2023-07-05 DIAGNOSIS — G44.309 POST-TRAUMATIC HEADACHE, NOT INTRACTABLE, UNSPECIFIED CHRONICITY PATTERN: Primary | ICD-10-CM

## 2023-07-05 PROCEDURE — 97110 THERAPEUTIC EXERCISES: CPT | Mod: GP

## 2023-07-05 PROCEDURE — 97140 MANUAL THERAPY 1/> REGIONS: CPT | Mod: GP

## 2023-07-05 PROCEDURE — 97530 THERAPEUTIC ACTIVITIES: CPT | Mod: GP

## 2023-07-05 NOTE — PATIENT INSTRUCTIONS
Home Exercise Program  Created by Bc Dunlap PT,MPT Nov 10th, 2021 View videos at [email or web address]  Total 8  SUBOCCIPITAL STRETCH WITH CHIN  OVER PRESSURE  Retract your head and apply gentle overpressure  with one hand on your chin.  Place your other hand on the back of your  head and apply a forward and upward force to  cause a small rotation to stretch the small  muscles on the back of your head.  Video # [id number]  Repeat 5 Times  Hold 10 Seconds  Complete 1 Set  Perform 2 Times a Day  UPPER TRAP STRETCH - HOLDING CHAIR  AND HEAD  While sitting in a chair, hold the seat with one  hand and place your other hand on your head  to assist in bending your head to the side as  shown.  Bend your head towards the opposite side of  the hand that is holding the chair seat. You  should feel a stretch to the side of your neck.  Video # [id number]  Repeat 5 Times  Hold 10 Seconds  Complete 1 Set  Perform 2 Times a Day  LEVATOR SCAPULAE STRETCH - HOLDING  CHAIR AND TOP OF HEAD  Grab the chair seat and then tilt your head to  the other side, then rotate to the side, then tip  downward as in looking at your opposite  pocket.  Use your other hand and apply over pressure  by gentling pulling.  You should be looking towards your opposite  pocket of the target side.  Video # [id number]  Repeat 5 Times  Hold 10 Seconds  Complete 1 Set  Perform 2 Times a Day  PECTORALIS CORNER STRETCH  While standing at a corner of a wall, place your  arms on the walls with elbows bent so that  your upper arms are horizontal and your  forearms are directed upwards as shown. Take  one step forward towards the corner. Bend  your front knee until a stretch is felt along the  front of your chest and/or shoulders. Your arms  should be pointed downward towards the  ground.  NOTE: Your legs should control the stretch by  bending or straightening your front knee.  Video # [id number]  Repeat 5 Times  Hold 10 Seconds  Complete 1 Set  Perform 2 Times a Day  Supine  Pec Minor Stretch  Lie on your back with your knees bend. Slightly  tuck your chin, let your hands lie at your side  with your palms up. For more of a stretch of  your thoracic you can lie on a half foam roll or  towel.  Repeat 5 Times  Hold 20 Seconds  Perform 2 Times a Day  Supine Pectoralis Stretch ([place])  - Patient lays supine with a foam roll or stack of  pillows aligned under spine  -Patient slowly  brings her arms into 90 deg horizontal  abduction and allows arms to lower to floor (if  possible).  -Patient holds position for 30 sec  before bringing arms above head while still in  horizontal abduction.  -Patient holds for another  30 sec before returning to 90 degrees.  Repeat 5 Times  Hold 30 Seconds  Complete 1 Set  Perform 2 Times a Day  Supine Arms Overhead breathing on  foam/towel roll  Deep breath w/ arms overhead ( pillow to  support arm if needed to keep it comfortable),  relax 5 breaths.  Repeat 5 Times  Hold 10 Seconds  Perform 2 Times a Day  Seated thoracic extension with cervical  retraction  Seated thoracic extension with cervical  retraction  -Sit your hips all the way back in the chair  -Place your hands over the top of your  shoulders just outside of your neck  -Bring your chin back as far as possible in the  horizontal plane without tilting your head up or  down  -Now arch your back over the chair until you  reach the point of restriction  - You may use a towel around the upper back if  you can't reach will with your hands  -Return to the start position and repeat.  Perform in a slow and controlled fashion  Repeat 5 Times  Hold 10 Seconds  Complete 1 Set  Perform 2 Times a Day

## 2023-07-05 NOTE — PROGRESS NOTES
Physical Therapy Visit    PT DAILY NOTE FOR OUTPATIENT THERAPY    Patient: Chel Price MRN: [id number]  : 1975 47 y.o.  Referring Physician: Colin Jama MD  Date of Visit: 2023    Certification Dates: 23 through 23    Diagnosis:   1. Post-traumatic headache, not intractable, unspecified chronicity pattern    2. Cervicalgia        Chief Complaints:  Headache, Neck Pain, Whooziness    Precautions:          TODAY'S VISIT    Time In Session:  Start Time: 1100  Stop Time: 1200  Time Calculation (min): 60 min   History/Vitals/Pain/Encounter Info - 23 1137        Injury History/Precautions/Daily Required Info    Document Type daily treatment     Primary Therapist Will     Chief Complaint/Reason for Visit  Headache, Neck Pain, Whooziness     Onset of Illness/Injury or Date of Surgery 23     Referring Physician Dr. Jama     History of present illness/functional impairment Suddenly when standing up, hit top of head on metal playground bar. Date of Occurence date of occurence: 2023. Loss of Consciousness none. Associated Hospitalizations none. Imaging CT scan; Done 23. Associated Injuries none. Post Traumatic Amnesia absent. Therapy History ; None. Associated Symptoms headaches, sensitivity to sensory stimulation; Nausea, neck pain. Work History ; Working 6 hr/day remotely x 8 years. Took two weeks off of work. Went back to work 23, and has been trying to avoid screen time. Started work at 9 am, by 11 am felt nauseated, had been taking breaks. Got through full day of work, felt worse at the end of the day     Patient/Family/Caregiver Comments/Observations Pt reports neck pain and whooziness and headache     Patient reported fall since last visit No        Pain Assessment    Currently in pain Yes     Pain: Body location Neck;Head     Pain Rating (0-10): Pre Activity 4   5/10 for head and 4/10 for neck       Pain Intervention    Intervention  Moist Heat, Manual therapy,  therex , education     Post Intervention Comments feels better                Daily Treatment Assessment and Plan - 07/05/23 1137        Daily Treatment Assessment and Plan    Progress toward goals Progressing     Daily Outcome Summary Focus of session was on relaxation and flexibility training for cervical spine and right calf with education re: lifestyle modifications to improve recovery from concossion and discussion re: sources of concussion symptoms other than cervical     Plan and Recommendations Continue flexibility and strength training for cervical spine and scapular muscles. When c-spine will tolerate and if indicated , performVestibular ocular motor screen, Video fenzel assessment, Positional testing, Static balance measures, SOT, FGA, MSQ, GST, DVA, HSSOT, static and dynamic balance training, vor adaptation/gaze stabilization training, habituation training, cardiovascular training.                     OBJECTIVE DATA TAKEN TODAY:         Today's Treatment:    VESTIBULAR PT FLOWSHEET    P.T. TREATMENT LOG   Precautions:   Eval Date: 6/29/23 Progress Note: q10v/30d   POC expires: 6/30/23 Insurance Limits: BOMN   Treatment Current Session Time   CANALITH  REPOSITIONING TREATMENT  (CPT 88814) Y/N Notes Not performed   CRT      NEURO RE-ED  (CPT 53920) Y/N Notes Not performed   BPPV ASSESSMENT     VOR CANCELLATION                      Standing H/VVOR-C     Ambulation w/ H/VVOR-C     VOR / GAZE STABILITY     Standing H/VVOR     Ambulation w/H/VVOR     H/VVOR on compliant surfaces     Functional VOR     HABITUATION     Ball circles     Repetitive functional movements     BALANCE- STATIC     On floor     Airex foam     Rockerboard     BALANCE- DYNAMIC     Ambulation-head turns/nods     Ambulation - 180 & 360 degree turns     Retro ambulation EO     Ambulation with EC     Obstacles     Tandem     C/S KINESTHESIA/ COORDINATION     MULTITASKING     OKS     *Objective Measures      THER-EX   (CPT 32036) Y/N SETS REPS  time 8-22 Minutes   STRETCHING        Chin tucks (supine, seated) yes   2 10s    Upper Trapezius Stretch yes   2 20s    Levator Scapula stretch yes   2 20s    Scalene/SCM stretch        Doorway Pectoralis stretch   Supine shoulder to table   n       2   10s    Open Book/ thread the needle thoracic rotation stretches                ROM        SNAGS for rotation        SNAGS for extension        Supine snow angels        Seated thoracic extension                STRENGTHENING        Cervical Stabilization        Cervical isometrics (c/s rot, flex, ext, SB, capital flex)        Resisted chin tucks w/ TB        Cervical retractions prone on elbows        Deep cervical flexor strengthening        Supine Chin tuck yes  5 5s    Scapular Stabilization        Supine scap retraction /depression is to mat n  5 5s    Seated scap retraction/depression        No money (B/L UE ER w/ TB)        TB rows/overhead rows        TB shoulder extension         TB shoulder horiz ABD        Standing chin tuck with shoulder flexion/abduction        W to Y's (supine or standing)        Prone T, Y, I         Modified supermans                CARDIOVASCULAR     UBE     Recumbent bike     Treadmill/BCTT     MANUAL  (CPT 82414) Y/N  8-22 Minutes   STM/MFR/TPR/SOT yes SO, UT, LS,    IASTM     Joint Mobilizations     Manual stretching  Contract relax for rotation Yes  n SO, UT,LS    E-STIM UNATTENDED  (CPT ) Y/N  Not performed   TENS/ H-wave      HOT / COLD PACKS  (CPT 69327) Y/N  0-7 Minutes   Heat yes During TA   Ice     THER ACT  (CPT 35444) Y/N  23-37 Minutes   Review pain, meds, falls, vitals, symptoms yes    *Subjective Measures    yes NDI: 36% see media for details   Patient Education/HEP yes 7/5/23: Educated regarding important lifestyle habits which contribute to concussion recovery including adequate sleep, cardiovascular exercise, proper nutrition, hydration, and stress reduction. Issued home cervical/thoracic program.  Patient  verbalized and demonstrated understanding of education provided.  6/29/23: Pt educated re: plan of care , posture , body mechanics, ergonomics, importance of rest/sleep, gentle exercise, hydration, stress reduction,  gradual incremental return to prior level of function.   Patient verbalized and demonstrated understanding of education provided.

## 2023-07-05 NOTE — OP PT TREATMENT LOG
VESTIBULAR PT FLOWSHEET    P.T. TREATMENT LOG   Precautions:   Eval Date: 6/29/23 Progress Note: q10v/30d   POC expires: 6/30/23 Insurance Limits: BOMN   Treatment Current Session Time   CANALITH  REPOSITIONING TREATMENT  (CPT 66685) Y/N Notes Not performed   CRT      NEURO RE-ED  (CPT 24038) Y/N Notes Not performed   BPPV ASSESSMENT     VOR CANCELLATION                      Standing H/VVOR-C     Ambulation w/ H/VVOR-C     VOR / GAZE STABILITY     Standing H/VVOR     Ambulation w/H/VVOR     H/VVOR on compliant surfaces     Functional VOR     HABITUATION     Ball circles     Repetitive functional movements     BALANCE- STATIC     On floor     Airex foam     Rockerboard     BALANCE- DYNAMIC     Ambulation-head turns/nods     Ambulation - 180 & 360 degree turns     Retro ambulation EO     Ambulation with EC     Obstacles     Tandem     C/S KINESTHESIA/ COORDINATION     MULTITASKING     OKS     *Objective Measures      THER-EX   (CPT 91438) Y/N SETS REPS time 8-22 Minutes   STRETCHING        Chin tucks (supine, seated) yes   2 10s    Upper Trapezius Stretch yes   2 20s    Levator Scapula stretch yes   2 20s    Scalene/SCM stretch        Doorway Pectoralis stretch   Supine shoulder to table   n       2   10s    Open Book/ thread the needle thoracic rotation stretches                ROM        SNAGS for rotation        SNAGS for extension        Supine snow angels        Seated thoracic extension                STRENGTHENING        Cervical Stabilization        Cervical isometrics (c/s rot, flex, ext, SB, capital flex)        Resisted chin tucks w/ TB        Cervical retractions prone on elbows        Deep cervical flexor strengthening        Supine Chin tuck yes  5 5s    Scapular Stabilization        Supine scap retraction /depression is to mat n  5 5s    Seated scap retraction/depression        No money (B/L UE ER w/ TB)        TB rows/overhead rows        TB shoulder extension         TB shoulder horiz ABD         Standing chin tuck with shoulder flexion/abduction        W to Y's (supine or standing)        Prone T, Y, I         Modified supermans                CARDIOVASCULAR     UBE     Recumbent bike     Treadmill/BCTT     MANUAL  (CPT 01423) Y/N  8-22 Minutes   STM/MFR/TPR/SOT yes SO, UT, LS,    IASTM     Joint Mobilizations     Manual stretching  Contract relax for rotation Yes  n SO, UT,LS    E-STIM UNATTENDED  (CPT ) Y/N  Not performed   TENS/ H-wave      HOT / COLD PACKS  (CPT 83062) Y/N  0-7 Minutes   Heat yes During TA   Ice     THER ACT  (CPT 62032) Y/N  23-37 Minutes   Review pain, meds, falls, vitals, symptoms yes    *Subjective Measures    yes NDI: 36% see media for details   Patient Education/HEP yes 7/5/23: Educated regarding important lifestyle habits which contribute to concussion recovery including adequate sleep, cardiovascular exercise, proper nutrition, hydration, and stress reduction. Issued home cervical/thoracic program.  Patient verbalized and demonstrated understanding of education provided.  6/29/23: Pt educated re: plan of care , posture , body mechanics, ergonomics, importance of rest/sleep, gentle exercise, hydration, stress reduction,  gradual incremental return to prior level of function.   Patient verbalized and demonstrated understanding of education provided.

## 2023-07-06 ENCOUNTER — HOSPITAL ENCOUNTER (OUTPATIENT)
Dept: OCCUPATIONAL THERAPY | Facility: REHABILITATION | Age: 48
Setting detail: THERAPIES SERIES
Discharge: HOME | End: 2023-07-06
Attending: STUDENT IN AN ORGANIZED HEALTH CARE EDUCATION/TRAINING PROGRAM
Payer: COMMERCIAL

## 2023-07-06 DIAGNOSIS — M54.2 CERVICALGIA: ICD-10-CM

## 2023-07-06 DIAGNOSIS — S06.0X0S CONCUSSION WITHOUT LOSS OF CONSCIOUSNESS, SEQUELA (CMS/HCC): Primary | ICD-10-CM

## 2023-07-06 DIAGNOSIS — H54.7 FUNCTIONAL VISION PROBLEM: ICD-10-CM

## 2023-07-06 PROCEDURE — 97112 NEUROMUSCULAR REEDUCATION: CPT | Mod: GO

## 2023-07-06 PROCEDURE — 97530 THERAPEUTIC ACTIVITIES: CPT | Mod: GO

## 2023-07-06 NOTE — OP OT TREATMENT LOG
"VISION/CONCUSSION OT FLOW SHEET    OT Vision/mTBI  EXERCISES CURRENT SESSION TIME   NEURO RE-ED  CPT 22938 TOTAL TIME FOR SESSION 45 minutes    Dynavision     VTS3/VTS4     CPT     BITs     NVR     Saccadic Fixation  at start of session - short saccades \"common letter\"   Min incr'd time required, denies incr'd PCS symptoms but reports difficulty tracking lines    Ocular Motor Control     Visual Tracing     3D Tracking     Visual Perception     Convergence/Divergence     Accommodation     Visual Stimulation  Alternating attention + visual integration skills, alternating between 3 tasks q2min:   1. 5-sequence symbol search + accommodation with array of 10 sequences on busy backdrop in superior field (Dynavision board)  4/10 sequences found in increments totaling 11min, incr'd time req'd with fatigue  2. \"Compass\" worksheet following step-by-step instructions with incr'd visual clutter  2/7 correctly completed on 1st attempt  3. \"Money in Rishi\" for line differentiation and following/carrying over step-by-step instructions.   100% accuracy  Increased difficulty noted with visual organization with increased clutter, cognitive fatigue.   x24min tolerance, mild incr HA/ocular strain/fatigue (4/10>6/10)     THER ACT  CPT 58351 TOTAL TIME FOR SESSION 10 minutes   Assessment     Pain, Vitals, Meds, Etc. Assessed; monitored, reviewed    HEP Reports good compliance with HEP to date - gets some eye strain but does not provoke full HA. Encouraged to incorporate personal challenges with delayed recall - making lists, errands, etc - using compensatory strategies     CURRENT HEP =     - Ocular ROM and Gaze Fixations  - Saccades  - Visual Tracing    Visual Endurance      Work/School Simulation      SELF CARE  CPT 16477 TOTAL TIME FOR SESSION    PCS Symptom Management/Education Reinforced education re: OT role s/p mTBI to address functional visual system, importance of balancing provocation of symptoms w/ rest and recovery. " Discussed nature of mTBI and impact on sympathetic nervous system regulation w/ emphasis on energy conservation technique implementation. Patient verbalizes good understanding of all education provided.     ADL/IADLs     GROUP  CPT 87826 TOTAL TIME FOR SESSION

## 2023-07-06 NOTE — PROGRESS NOTES
Occupational Therapy Visit    OT DAILY NOTE FOR OUTPATIENT THERAPY    Patient: Chel Price   MRN: [id number]  : 1975 47 y.o.  Referring Physician: Colin Jama MD  Date of Visit: 2023      Certification Dates: 23 through 23    Diagnosis:   1. Concussion without loss of consciousness, sequela (CMS/HCC)    2. Functional vision problem    3. Cervicalgia        Chief Complaints:   PCS    Precautions:  Existing Precautions/Restrictions: no known precautions/restrictions    TODAY'S VISIT    Time In Session:  Start Time: 905  Stop Time: 1000  Time Calculation (min): 55 min   History/Vitals/Pain/Encounter Info - 23 0907        Injury History/Precautions/Daily Required Info    Document Type daily treatment     Primary Therapist Tiffany Hammonds     Chief Complaint/Reason for Visit  PCS     Onset of Illness/Injury or Date of Surgery 23     Referring Physician Wiley     Existing Precautions/Restrictions no known precautions/restrictions     History of present illness/functional impairment Chel Price is a 48 y/o gentlelady with a history of anxiety, ADHD, who presents to concussion clinic for evaluation of an injury sustained on 23.   She stood up suddenly while at the playground with her child on 23 and hit the top of her head on playground equipment. There was no loss of consciousness. She developed headache and nausea the next day suddenly. She saw her PCP and was referred for a CT scan of the head which was unremarkable for acute intracranial pathology. She developed worsening symptoms and took time off of work. She returned to work and did notice worsening light sensitivity, screen intolerance, nausea, and feeling worse the following day. She is typically very active, walking a lot during the day and has been hesitant to return to her usual activity. She does have a history of migraine and sees Dr. Fox. She takes Emgality and Ubrelvy. She has headaches and neck  "pain now which is different than her usual headache. This left sided neck pain is described as a tightness and ache and her head feels \"fuzzy\" with tingling and zapping sensations on the left side of her head. She does have some nausea with this type of headache. She takes ibuprofen and has tried topical heat and is has helped. She does have flexeril at home but hasn't used it for neck pain or headache. She notes trouble falling asleep. Uses teas and natural remedies to help with sleep. Has tried melatonin without much effect.  History of anxiety on lexapro, ADHD on Vyvanse 20 mg. Sees NATALYA Lake at [facility]. Feeling executive dysfunction worse over the past two weeks. Still gets her menstrual period, LMP was 5/18/23, utilizing different forms of birth control.   Driving: No issues.   No prior concussions.  No litigation.  Allergies include penicillins and sulfa Medical history includes asthma, migraines, and perioral dermatitis Her surgical history includes cholecystectomy  Her family history is significant for thyroid disease and hypertension in her mother, and depression in her sister. She is working as a  She does not drink alcohol or use tobacco.     Patient/Family/Caregiver Comments/Observations Arrives with mild HA 3-4/10, \"wooziness\", had a good night of sleep last night but awoke with a terrible headache yesterday morning - has been feeling lousy the last couple days .  Reports increase in \"absent-minded\" moments - despite h/o ADHD she feels it has increased significantly since concussion, identifying specific instances with delayed recall being a concern. Discussed ways to integrate this skill into tx sessions.     Patient reported fall since last visit No        Pain Assessment    Currently in pain Yes     Preferred Pain Scale number (Numeric Rating Pain Scale)     Pain Side/Orientation bilateral     Pain: Body location Head;Eye     Pain Rating (0-10): Pre Activity 4     " "Pain Rating (0-10): Activity 6     Pain Rating (0-10): Post Activity 6        Pain Interventions    Intervention  monitored, encouraged rest breaks prn     Post Intervention Comments mild incr PCS symptoms, pt reports manageable with rest as planned after session         Services    Do You Speak a Language Other Than English at Home? no                Daily Treatment Assessment and Plan - 07/06/23 0907        Daily Treatment Assessment and Plan    Progress toward goals Progressing     Daily Outcome Summary Pt arrives with mild PCS symptoms, denies significant increase in HA/wooziness at end of session though does report increased overall fatigue and does require increased time for cognitive and visual processing towards end of session indicating signs of fatigue.  Reinforced HEP and discussed ways of challenging specific deficits areas throughout the day with compensatory strategies, including difficulties with delayed recall- pt verbs good understanding.     Plan and Recommendations Continue OT POC 1x week for 6-8 weeks with focus on functional vision including saccadic fixation, ocular motor control, gaze fixations, computer use and multi-stim habituation with goal of return to work full time.                     OBJECTIVE DATA TAKEN TODAY    None Taken    Today's Treatment:    VISION/CONCUSSION OT FLOW SHEET    OT Vision/mTBI  EXERCISES CURRENT SESSION TIME   NEURO RE-ED  CPT 87497 TOTAL TIME FOR SESSION 45 minutes    Dynavision     VTS3/VTS4     CPT     BITs     NVR     Saccadic Fixation  at start of session - short saccades \"common letter\"   Min incr'd time required, denies incr'd PCS symptoms but reports difficulty tracking lines    Ocular Motor Control     Visual Tracing     3D Tracking     Visual Perception     Convergence/Divergence     Accommodation     Visual Stimulation  Alternating attention + visual integration skills, alternating between 3 tasks q2min:   1. 5-sequence symbol search + " "accommodation with array of 10 sequences on busy backdrop in superior field (Dynavision board)  4/10 sequences found in increments totaling 11min, incr'd time req'd with fatigue  2. \"Compass\" worksheet following step-by-step instructions with incr'd visual clutter  2/7 correctly completed on 1st attempt  3. \"Money in Rishi\" for line differentiation and following/carrying over step-by-step instructions.   100% accuracy  Increased difficulty noted with visual organization with increased clutter, cognitive fatigue.   x24min tolerance, mild incr HA/ocular strain/fatigue (4/10>6/10)     THER ACT  CPT 59042 TOTAL TIME FOR SESSION 10 minutes   Assessment     Pain, Vitals, Meds, Etc. Assessed; monitored, reviewed    HEP Reports good compliance with HEP to date - gets some eye strain but does not provoke full HA. Encouraged to incorporate personal challenges with delayed recall - making lists, errands, etc - using compensatory strategies     CURRENT HEP =     - Ocular ROM and Gaze Fixations  - Saccades  - Visual Tracing    Visual Endurance      Work/School Simulation      SELF CARE  CPT 28340 TOTAL TIME FOR SESSION    PCS Symptom Management/Education Reinforced education re: OT role s/p mTBI to address functional visual system, importance of balancing provocation of symptoms w/ rest and recovery. Discussed nature of mTBI and impact on sympathetic nervous system regulation w/ emphasis on energy conservation technique implementation. Patient verbalizes good understanding of all education provided.     ADL/IADLs     GROUP  CPT 82298 TOTAL TIME FOR SESSION                                              "

## 2023-07-10 ENCOUNTER — HOSPITAL ENCOUNTER (OUTPATIENT)
Dept: PHYSICAL THERAPY | Facility: REHABILITATION | Age: 48
Setting detail: THERAPIES SERIES
Discharge: HOME | End: 2023-07-10
Attending: STUDENT IN AN ORGANIZED HEALTH CARE EDUCATION/TRAINING PROGRAM
Payer: COMMERCIAL

## 2023-07-10 DIAGNOSIS — S06.0X0S CONCUSSION WITHOUT LOSS OF CONSCIOUSNESS, SEQUELA (CMS/HCC): ICD-10-CM

## 2023-07-10 DIAGNOSIS — G44.309 POST-TRAUMATIC HEADACHE, NOT INTRACTABLE, UNSPECIFIED CHRONICITY PATTERN: ICD-10-CM

## 2023-07-10 DIAGNOSIS — M54.2 CERVICALGIA: Primary | ICD-10-CM

## 2023-07-10 PROCEDURE — 97110 THERAPEUTIC EXERCISES: CPT | Mod: GP

## 2023-07-10 PROCEDURE — 97112 NEUROMUSCULAR REEDUCATION: CPT | Mod: GP

## 2023-07-10 NOTE — OP PT TREATMENT LOG
VESTIBULAR PT FLOWSHEET    P.T. TREATMENT LOG   Precautions:   Eval Date: 6/29/23 Progress Note: q10v/30d   POC expires: 6/30/23 Insurance Limits: BOMN   Treatment Current Session Time   CANALITH  REPOSITIONING TREATMENT  (CPT 71090) Y/N Notes Not performed   CRT      NEURO RE-ED  (CPT 27496) Y/N Notes 23-37 Minutes   BPPV ASSESSMENT yes See objective information/vestibular specialty flow sheet/media for details.   VOR CANCELLATION   yes         Pt with mod increase in dizziness with 10 cycles   Standing H/VVOR-C  VOR-C 5 cycles of horizontal , vertical , clockwise , counterclockwise with the following symptoms                     Horizontal:                    Vertical:                    Clockwise:                    Counterclockwise:    Ambulation w/ H/VVOR-C     VOR / GAZE STABILITY NV    Standing H/VVOR     Ambulation w/H/VVOR     H/VVOR on compliant surfaces     Functional VOR     HABITUATION     Ball circles     Repetitive functional movements     BALANCE- STATIC     On floor     Airex foam     Rockerboard     BALANCE- DYNAMIC     Ambulation-head turns/nods     Ambulation - 180 & 360 degree turns     Retro ambulation EO     Ambulation with EC     Obstacles     Tandem     C/S KINESTHESIA/ COORDINATION     MULTITASKING     OKS     *Objective Measures      THER-EX   (CPT 85899) Y/N SETS REPS time 23-37 Minutes   STRETCHING        Chin tucks (supine, seated) yes   2 10s    Upper Trapezius Stretch yes   2 20s    Levator Scapula stretch yes   2 20s    Scalene/SCM stretch yes  2 20 s    Foam roll Pectoralis stretch   Supine shoulder to table   yes       2   10s    Open Book/ thread the needle thoracic rotation stretches yes  2 20 s    Seated thoracic extension over back of chair with neck support        ROM        SNAGS for rotation        SNAGS for extension        Supine snow angels        Seated thoracic extension                STRENGTHENING        Cervical Stabilization        Cervical isometrics (c/s rot,  flex, ext, SB, capital flex)        Resisted chin tucks w/ TB        Cervical retractions prone on elbows        Deep cervical flexor strengthening        Supine Chin tuck yes  5 5s    Scapular Stabilization        Supine scap retraction /depression is to mat n  5 5s    Seated scap retraction/depression        No money (B/L UE ER w/ TB)        TB rows/overhead rows        TB shoulder extension         TB shoulder horiz ABD        Standing chin tuck with shoulder flexion/abduction        W to Y's (supine or standing)          Supine foam roll W to Y yes  5  5s    Prone T, Y, I         Modified supermans                CARDIOVASCULAR     UBE     Recumbent bike     Treadmill/BCTT     MANUAL  (CPT 48628) Y/N  Not performed   STM/MFR/TPR/SOT yes SO, UT, LS,    IASTM     Joint Mobilizations     Manual stretching  Contract relax for rotation Yes  n SO, UT,LS    E-STIM UNATTENDED  (CPT ) Y/N  Not performed   TENS/ H-wave      HOT / COLD PACKS  (CPT 03802) Y/N  0-7 Minutes   Heat yes During TA   Ice     THER ACT  (CPT 13744) Y/N  8-22 Minutes   Review pain, meds, falls, vitals, symptoms yes    *Subjective Measures    yes NDI: 36% see media for details   Patient Education/HEP yes 7/5/23: Educated regarding important lifestyle habits which contribute to concussion recovery including adequate sleep, cardiovascular exercise, proper nutrition, hydration, and stress reduction. Issued home cervical/thoracic program.  Patient verbalized and demonstrated understanding of education provided.  6/29/23: Pt educated re: plan of care , posture , body mechanics, ergonomics, importance of rest/sleep, gentle exercise, hydration, stress reduction,  gradual incremental return to prior level of function.   Patient verbalized and demonstrated understanding of education provided.

## 2023-07-10 NOTE — PROGRESS NOTES
Physical Therapy Visit    PT DAILY NOTE FOR OUTPATIENT THERAPY    Patient: Chel Price MRN: [id number]  : 1975 47 y.o.  Referring Physician: Colin aJma MD  Date of Visit: 7/10/2023    Certification Dates: 23 through 23    Diagnosis:   1. Cervicalgia    2. Concussion without loss of consciousness, sequela (CMS/HCC)    3. Post-traumatic headache, not intractable, unspecified chronicity pattern        Chief Complaints:  Headache, Neck Pain, Whooziness    Precautions:          TODAY'S VISIT    Time In Session:  Start Time: 1100  Stop Time: 1200  Time Calculation (min): 60 min   History/Vitals/Pain/Encounter Info - 07/10/23 1128        Injury History/Precautions/Daily Required Info    Document Type daily treatment     Primary Therapist Will     Chief Complaint/Reason for Visit  Headache, Neck Pain, Whooziness     Onset of Illness/Injury or Date of Surgery 23     Referring Physician Dr. Jama     History of present illness/functional impairment Suddenly when standing up, hit top of head on metal playground bar. Date of Occurence date of occurence: 2023. Loss of Consciousness none. Associated Hospitalizations none. Imaging CT scan; Done 23. Associated Injuries none. Post Traumatic Amnesia absent. Therapy History ; None. Associated Symptoms headaches, sensitivity to sensory stimulation; Nausea, neck pain. Work History ; Working 6 hr/day remotely x 8 years. Took two weeks off of work. Went back to work 23, and has been trying to avoid screen time. Started work at 9 am, by 11 am felt nauseated, had been taking breaks. Got through full day of work, felt worse at the end of the day     Patient/Family/Caregiver Comments/Observations Pt reports that her neck is feeling better     Patient reported fall since last visit No        Pain Assessment    Currently in pain Yes     Pain Rating (0-10): Pre Activity 1        Pain Intervention    Intervention  Reviewed home program     Post  Intervention Comments slight increase from baseline due to the stretching                Daily Treatment Assessment and Plan - 07/10/23 1229        Daily Treatment Assessment and Plan    Progress toward goals Progressing     Daily Outcome Summary Reviewed and performed all home exercises . Pt's original copy was thrown out when cleaning. Pt tolerated that exercises well and demonstrated understanding. Pt's vestibular/ocular motor screen showed sensitivity to vor and vor-c. Positonal testing with infrared goggles showed  L PC canalithiasis which was repositioned with a modified left Epley with no advverse reaction.     Plan and Recommendations Recheck positional tests with serena miles next visit. Introduce vor adapation /gaze stabilization. and vor-c . Continue flexibility and strength training for cervical spine and scapular muscles.  , Static balance measures, SOT, FGA, MSQ, GST, DVA, HSSOT, static and dynamic balance training, vor adaptation/gaze stabilization training, habituation training, cardiovascular training.                     OBJECTIVE DATA TAKEN TODAY:    Vestibular     PT Vestibular Evaluation - 07/10/23 1100        Vestibular Symptoms    Symptoms Dizziness;Lightheadedness;Nausea;Headache;Photophobia;Rocking;Emotional changes;Irritability;Aural fullness;Cognitive changes;Cognitive fatigue;Fogginess;Forgetfulness;Slowed processing;Difficulty using computer;Difficulty watching TV        Vestibular/Ocular    Corrective lenses Progressives     Eye ROM WNL     Convergence Vision WNL     Divergent Vision WNL     Pupil Alignment WNL     Cover/Uncover WNL     Cover/Cross Over WNL     Smooth Pursuit/Small Target WNL     Saccades WNL     Vestibular Ocular Reflex (VOR) Abnormal     Comments tiny bit dizzy     Gaze-Evoked Nystagmus WNL     VOR Cancellation Abnormal        BPPV    Right Rachael Hallpike  WNL     Left Rachael Hallpike  Abnormal     Left Rachael Hallpike Comments upbeating left torsion with mild symptoms      Sit to Supine WNL     Horizontal Canal/Roll Test WNL     Horizontal Roll Test to Right  WNL     Horizontal Roll Test to Left  WNL     Interpretation of Results L PC canalithiasis     BPPV Treatment Modified Left Epley .                 Today's Treatment:    VESTIBULAR PT FLOWSHEET    P.T. TREATMENT LOG   Precautions:   Eval Date: 6/29/23 Progress Note: q10v/30d   POC expires: 6/30/23 Insurance Limits: BOMN   Treatment Current Session Time   CANALITH  REPOSITIONING TREATMENT  (CPT 53813) Y/N Notes Not performed   CRT      NEURO RE-ED  (CPT 40313) Y/N Notes 23-37 Minutes   BPPV ASSESSMENT yes See objective information/vestibular specialty flow sheet/media for details.   VOR CANCELLATION   yes         Pt with mod increase in dizziness with 10 cycles   Standing H/VVOR-C  VOR-C 5 cycles of horizontal , vertical , clockwise , counterclockwise with the following symptoms                     Horizontal:                    Vertical:                    Clockwise:                    Counterclockwise:    Ambulation w/ H/VVOR-C     VOR / GAZE STABILITY NV    Standing H/VVOR     Ambulation w/H/VVOR     H/VVOR on compliant surfaces     Functional VOR     HABITUATION     Ball circles     Repetitive functional movements     BALANCE- STATIC     On floor     Airex foam     Rockerboard     BALANCE- DYNAMIC     Ambulation-head turns/nods     Ambulation - 180 & 360 degree turns     Retro ambulation EO     Ambulation with EC     Obstacles     Tandem     C/S KINESTHESIA/ COORDINATION     MULTITASKING     OKS     *Objective Measures      THER-EX   (CPT 44943) Y/N SETS REPS time 23-37 Minutes   STRETCHING        Chin tucks (supine, seated) yes   2 10s    Upper Trapezius Stretch yes   2 20s    Levator Scapula stretch yes   2 20s    Scalene/SCM stretch yes  2 20 s    Foam roll Pectoralis stretch   Supine shoulder to table   yes       2   10s    Open Book/ thread the needle thoracic rotation stretches yes  2 20 s    Seated thoracic extension  over back of chair with neck support        ROM        SNAGS for rotation        SNAGS for extension        Supine snow angels        Seated thoracic extension                STRENGTHENING        Cervical Stabilization        Cervical isometrics (c/s rot, flex, ext, SB, capital flex)        Resisted chin tucks w/ TB        Cervical retractions prone on elbows        Deep cervical flexor strengthening        Supine Chin tuck yes  5 5s    Scapular Stabilization        Supine scap retraction /depression is to mat n  5 5s    Seated scap retraction/depression        No money (B/L UE ER w/ TB)        TB rows/overhead rows        TB shoulder extension         TB shoulder horiz ABD        Standing chin tuck with shoulder flexion/abduction        W to Y's (supine or standing)          Supine foam roll W to Y yes  5  5s    Prone T, Y, I         Modified supermans                CARDIOVASCULAR     UBE     Recumbent bike     Treadmill/BCTT     MANUAL  (CPT 02750) Y/N  Not performed   STM/MFR/TPR/SOT yes SO, UT, LS,    IASTM     Joint Mobilizations     Manual stretching  Contract relax for rotation Yes  n SO, UT,LS    E-STIM UNATTENDED  (CPT ) Y/N  Not performed   TENS/ H-wave      HOT / COLD PACKS  (CPT 80341) Y/N  0-7 Minutes   Heat yes During TA   Ice     THER ACT  (CPT 87206) Y/N  8-22 Minutes   Review pain, meds, falls, vitals, symptoms yes    *Subjective Measures    yes NDI: 36% see media for details   Patient Education/HEP yes 7/5/23: Educated regarding important lifestyle habits which contribute to concussion recovery including adequate sleep, cardiovascular exercise, proper nutrition, hydration, and stress reduction. Issued home cervical/thoracic program.  Patient verbalized and demonstrated understanding of education provided.  6/29/23: Pt educated re: plan of care , posture , body mechanics, ergonomics, importance of rest/sleep, gentle exercise, hydration, stress reduction,  gradual incremental return to prior  level of function.   Patient verbalized and demonstrated understanding of education provided.

## 2023-07-13 ENCOUNTER — HOSPITAL ENCOUNTER (OUTPATIENT)
Dept: PHYSICAL THERAPY | Facility: REHABILITATION | Age: 48
Setting detail: THERAPIES SERIES
Discharge: HOME | End: 2023-07-13
Attending: STUDENT IN AN ORGANIZED HEALTH CARE EDUCATION/TRAINING PROGRAM
Payer: COMMERCIAL

## 2023-07-13 DIAGNOSIS — M54.2 CERVICALGIA: ICD-10-CM

## 2023-07-13 DIAGNOSIS — S06.0X0S CONCUSSION WITHOUT LOSS OF CONSCIOUSNESS, SEQUELA (CMS/HCC): Primary | ICD-10-CM

## 2023-07-13 DIAGNOSIS — G44.309 POST-TRAUMATIC HEADACHE, NOT INTRACTABLE, UNSPECIFIED CHRONICITY PATTERN: ICD-10-CM

## 2023-07-13 PROCEDURE — 97112 NEUROMUSCULAR REEDUCATION: CPT | Mod: GP

## 2023-07-13 PROCEDURE — 97110 THERAPEUTIC EXERCISES: CPT | Mod: GP

## 2023-07-13 PROCEDURE — 97140 MANUAL THERAPY 1/> REGIONS: CPT | Mod: GP

## 2023-07-13 NOTE — PROGRESS NOTES
Physical Therapy Visit    PT DAILY NOTE FOR OUTPATIENT THERAPY    Patient: Chel Price MRN: [id number]  : 1975 47 y.o.  Referring Physician: Colin Jama MD  Date of Visit: 2023    Certification Dates: 23 through 23    Diagnosis:   1. Concussion without loss of consciousness, sequela (CMS/HCC)    2. Cervicalgia    3. Post-traumatic headache, not intractable, unspecified chronicity pattern        Chief Complaints:  Headache, Neck Pain, Whooziness    Precautions:          TODAY'S VISIT    Time In Session:  Start Time: 08  Stop Time: 900  Time Calculation (min): 60 min   History/Vitals/Pain/Encounter Info - 23 0807        Injury History/Precautions/Daily Required Info    Document Type daily treatment     Primary Therapist Will     Chief Complaint/Reason for Visit  Headache, Neck Pain, Whooziness     Onset of Illness/Injury or Date of Surgery 23     Referring Physician Dr. Jama     History of present illness/functional impairment Suddenly when standing up, hit top of head on metal playground bar. Date of Occurence date of occurence: 2023. Loss of Consciousness none. Associated Hospitalizations none. Imaging CT scan; Done 23. Associated Injuries none. Post Traumatic Amnesia absent. Therapy History ; None. Associated Symptoms headaches, sensitivity to sensory stimulation; Nausea, neck pain. Work History ; Working 6 hr/day remotely x 8 years. Took two weeks off of work. Went back to work 23, and has been trying to avoid screen time. Started work at 9 am, by 11 am felt nauseated, had been taking breaks. Got through full day of work, felt worse at the end of the day     Patient/Family/Caregiver Comments/Observations Pt reports feeling less whoozy after the repositioning and been having less headaches     Patient reported fall since last visit No        Pain Assessment    Currently in pain Yes     Preferred Pain Scale number (Numeric Rating Pain Scale)     Pain:  Body location Head     Pain Rating (0-10): Pre Activity 2        Pain Intervention    Intervention  monitored     Post Intervention Comments feels better after the heat and SOR                Daily Treatment Assessment and Plan - 07/13/23 0807        Daily Treatment Assessment and Plan    Progress toward goals Progressing     Daily Outcome Summary Still slight (+) left PC canalithiasis, treated with modified L Epley and heat to neck with SOR and strethc     Plan and Recommendations Recheck positional tests with serena miles next visit. Introduce vor adapation /gaze stabilization. and vor-c . Continue flexibility and strength training for cervical spine and scapular muscles.  , Static balance measures, SOT, FGA, MSQ, GST, DVA, HSSOT, static and dynamic balance training, vor adaptation/gaze stabilization training, habituation training, cardiovascular training.                     OBJECTIVE DATA TAKEN TODAY:    Vestibular     PT Vestibular Evaluation - 07/13/23 0800        BPPV    Left Rachael Hallpike  Abnormal     Left Rachael Hallpike Comments trace left torsion /upbeat     Interpretation of Results mild L PC canalithiasis     BPPV Treatment modified L epley     Post CRT Instructions Yes   issued Home Epleys                Today's Treatment:    VESTIBULAR PT FLOWSHEET    P.T. TREATMENT LOG   Precautions:   Eval Date: 6/29/23 Progress Note: q10v/30d   POC expires: 6/30/23 Insurance Limits: BOMN   Treatment Current Session Time   CANALITH  REPOSITIONING TREATMENT  (CPT 69467) Y/N Notes 8-22 Minutes   CRT yes See objective information/vestibular specialty flow sheet/media for details.    NEURO RE-ED  (CPT 79850) Y/N Notes 23-37 Minutes   BPPV ASSESSMENT yes See objective information/vestibular specialty flow sheet/media for details.   VOR CANCELLATION            Pt with mod increase in dizziness with 10 cycles   Standing H/VVOR-C  VOR-C 5 cycles of horizontal , vertical , clockwise , counterclockwise with the following  symptoms                     Horizontal:                    Vertical:                    Clockwise:                    Counterclockwise:    Ambulation w/ H/VVOR-C     VOR / GAZE STABILITY NV    Standing H/VVOR     Ambulation w/H/VVOR     H/VVOR on compliant surfaces     Functional VOR     HABITUATION     Ball circles     Repetitive functional movements     BALANCE- STATIC     On floor     Airex foam     Rockerboard     BALANCE- DYNAMIC     Ambulation-head turns/nods     Ambulation - 180 & 360 degree turns     Retro ambulation EO     Ambulation with EC     Obstacles     Tandem     C/S KINESTHESIA/ COORDINATION     MULTITASKING     OKS     *Objective Measures      THER-EX   (CPT 22576) Y/N SETS REPS time 8-22 Minutes   STRETCHING        Chin tucks (supine, seated)     2 10s    Upper Trapezius Stretch yes   2 20s    Levator Scapula stretch yes   2 20s    Scalene/SCM stretch yes  2 20 s    Foam roll Pectoralis stretch   Supine shoulder to table        2   10s    Open Book/ thread the needle thoracic rotation stretches   2 20 s    Seated thoracic extension over back of chair with neck support        ROM        SNAGS for rotation        SNAGS for extension        Supine snow angels        Seated thoracic extension                STRENGTHENING        Cervical Stabilization        Cervical isometrics (c/s rot, flex, ext, SB, capital flex)        Resisted chin tucks w/ TB        Cervical retractions prone on elbows        Deep cervical flexor strengthening        Supine Chin tuck    5 5s    Scapular Stabilization        Supine scap retraction /depression is to mat   5 5s    Seated scap retraction/depression        No money (B/L UE ER w/ TB)        TB rows/overhead rows        TB shoulder extension         TB shoulder horiz ABD        Standing chin tuck with shoulder flexion/abduction        W to Y's (supine or standing)          Supine foam roll W to Y    5  5s    Prone T, Y, I         Elmer supermans                 CARDIOVASCULAR     UBE     Recumbent bike     Treadmill/BCTT     MANUAL  (CPT 50682) Y/N  8-22 Minutes   STM/MFR/TPR/SOT yes SO, UT, LS,    IASTM     Joint Mobilizations     Manual stretching  Contract relax for rotation Yes  n SO, UT,LS    E-STIM UNATTENDED  (CPT ) Y/N  Not performed   TENS/ H-wave      HOT / COLD PACKS  (CPT 23745) Y/N  0-7 Minutes   Heat yes During TA   Ice     THER ACT  (CPT 75967) Y/N  8-22 Minutes   Review pain, meds, falls, vitals, symptoms yes    *Subjective Measures    yes NDI: 36% see media for details   Patient Education/HEP yes 7/13/23: Education regarding anatomy and pathophysiology of BPPV including assessment, treatment methods and post repositioning instructions.issued home epley   7/5/23: Educated regarding important lifestyle habits which contribute to concussion recovery including adequate sleep, cardiovascular exercise, proper nutrition, hydration, and stress reduction. Issued home cervical/thoracic program.  Patient verbalized and demonstrated understanding of education provided.  6/29/23: Pt educated re: plan of care , posture , body mechanics, ergonomics, importance of rest/sleep, gentle exercise, hydration, stress reduction,  gradual incremental return to prior level of function.   Patient verbalized and demonstrated understanding of education provided.

## 2023-07-13 NOTE — OP PT TREATMENT LOG
VESTIBULAR PT FLOWSHEET    P.T. TREATMENT LOG   Precautions:   Eval Date: 6/29/23 Progress Note: q10v/30d   POC expires: 6/30/23 Insurance Limits: BOMN   Treatment Current Session Time   CANALITH  REPOSITIONING TREATMENT  (CPT 82054) Y/N Notes 8-22 Minutes   CRT yes See objective information/vestibular specialty flow sheet/media for details.    NEURO RE-ED  (CPT 36093) Y/N Notes 23-37 Minutes   BPPV ASSESSMENT yes See objective information/vestibular specialty flow sheet/media for details.   VOR CANCELLATION            Pt with mod increase in dizziness with 10 cycles   Standing H/VVOR-C  VOR-C 5 cycles of horizontal , vertical , clockwise , counterclockwise with the following symptoms                     Horizontal:                    Vertical:                    Clockwise:                    Counterclockwise:    Ambulation w/ H/VVOR-C     VOR / GAZE STABILITY NV    Standing H/VVOR     Ambulation w/H/VVOR     H/VVOR on compliant surfaces     Functional VOR     HABITUATION     Ball circles     Repetitive functional movements     BALANCE- STATIC     On floor     Airex foam     Rockerboard     BALANCE- DYNAMIC     Ambulation-head turns/nods     Ambulation - 180 & 360 degree turns     Retro ambulation EO     Ambulation with EC     Obstacles     Tandem     C/S KINESTHESIA/ COORDINATION     MULTITASKING     OKS     *Objective Measures      THER-EX   (CPT 38883) Y/N SETS REPS time 8-22 Minutes   STRETCHING        Chin tucks (supine, seated)     2 10s    Upper Trapezius Stretch yes   2 20s    Levator Scapula stretch yes   2 20s    Scalene/SCM stretch yes  2 20 s    Foam roll Pectoralis stretch   Supine shoulder to table        2   10s    Open Book/ thread the needle thoracic rotation stretches   2 20 s    Seated thoracic extension over back of chair with neck support        ROM        SNAGS for rotation        SNAGS for extension        Supine snow angels        Seated thoracic extension                STRENGTHENING         Cervical Stabilization        Cervical isometrics (c/s rot, flex, ext, SB, capital flex)        Resisted chin tucks w/ TB        Cervical retractions prone on elbows        Deep cervical flexor strengthening        Supine Chin tuck    5 5s    Scapular Stabilization        Supine scap retraction /depression is to mat   5 5s    Seated scap retraction/depression        No money (B/L UE ER w/ TB)        TB rows/overhead rows        TB shoulder extension         TB shoulder horiz ABD        Standing chin tuck with shoulder flexion/abduction        W to Y's (supine or standing)          Supine foam roll W to Y    5  5s    Prone T, Y, I         Modified supermans                CARDIOVASCULAR     UBE     Recumbent bike     Treadmill/BCTT     MANUAL  (CPT 63887) Y/N  8-22 Minutes   STM/MFR/TPR/SOT yes SO, UT, LS,    IASTM     Joint Mobilizations     Manual stretching  Contract relax for rotation Yes  n SO, UT,LS    E-STIM UNATTENDED  (CPT ) Y/N  Not performed   TENS/ H-wave      HOT / COLD PACKS  (CPT 97384) Y/N  0-7 Minutes   Heat yes During TA   Ice     THER ACT  (CPT 85522) Y/N  8-22 Minutes   Review pain, meds, falls, vitals, symptoms yes    *Subjective Measures    yes NDI: 36% see media for details   Patient Education/HEP yes 7/13/23: Education regarding anatomy and pathophysiology of BPPV including assessment, treatment methods and post repositioning instructions.issued home epley   7/5/23: Educated regarding important lifestyle habits which contribute to concussion recovery including adequate sleep, cardiovascular exercise, proper nutrition, hydration, and stress reduction. Issued home cervical/thoracic program.  Patient verbalized and demonstrated understanding of education provided.  6/29/23: Pt educated re: plan of care , posture , body mechanics, ergonomics, importance of rest/sleep, gentle exercise, hydration, stress reduction,  gradual incremental return to prior level of function.   Patient verbalized  and demonstrated understanding of education provided.

## 2023-07-24 ENCOUNTER — HOSPITAL ENCOUNTER (OUTPATIENT)
Dept: PHYSICAL THERAPY | Facility: REHABILITATION | Age: 48
Setting detail: THERAPIES SERIES
Discharge: HOME | End: 2023-07-24
Attending: STUDENT IN AN ORGANIZED HEALTH CARE EDUCATION/TRAINING PROGRAM
Payer: COMMERCIAL

## 2023-07-24 DIAGNOSIS — M54.2 CERVICALGIA: ICD-10-CM

## 2023-07-24 DIAGNOSIS — G44.309 POST-TRAUMATIC HEADACHE, NOT INTRACTABLE, UNSPECIFIED CHRONICITY PATTERN: ICD-10-CM

## 2023-07-24 DIAGNOSIS — S06.0X0S CONCUSSION WITHOUT LOSS OF CONSCIOUSNESS, SEQUELA (CMS/HCC): Primary | ICD-10-CM

## 2023-07-24 PROCEDURE — 97140 MANUAL THERAPY 1/> REGIONS: CPT | Mod: GP

## 2023-07-24 PROCEDURE — 97110 THERAPEUTIC EXERCISES: CPT | Mod: GP

## 2023-07-24 PROCEDURE — 97112 NEUROMUSCULAR REEDUCATION: CPT | Mod: GP

## 2023-07-24 NOTE — OP PT TREATMENT LOG
VESTIBULAR PT FLOWSHEET    P.T. TREATMENT LOG   Precautions:   Eval Date: 6/29/23 Progress Note: q10v/30d   POC expires: 6/30/23 Insurance Limits: BOMN   Treatment Current Session Time   CANALITH  REPOSITIONING TREATMENT  (CPT 28801) Y/N Notes 8-22 Minutes   CRT yes See objective information/vestibular specialty flow sheet/media for details.    NEURO RE-ED  (CPT 00660) Y/N Notes 23-37 Minutes   BPPV ASSESSMENT yes See objective information/vestibular specialty flow sheet/media for details.   VOR CANCELLATION            Pt with mod increase in dizziness with 10 cycles   Standing H/VVOR-C  VOR-C 5 cycles of horizontal , vertical , clockwise , counterclockwise with the following symptoms                     Horizontal:                    Vertical:                    Clockwise:                    Counterclockwise:    Ambulation w/ H/VVOR-C     VOR / GAZE STABILITY NV    Standing H/VVOR     Ambulation w/H/VVOR     H/VVOR on compliant surfaces     Functional VOR     HABITUATION     Ball circles     Repetitive functional movements     BALANCE- STATIC     On floor     Airex foam     Rockerboard     BALANCE- DYNAMIC     Ambulation-head turns/nods     Ambulation - 180 & 360 degree turns     Retro ambulation EO     Ambulation with EC     Obstacles     Tandem     C/S KINESTHESIA/ COORDINATION     MULTITASKING     OKS     *Objective Measures      THER-EX   (CPT 72075) Y/N SETS REPS time 8-22 Minutes   STRETCHING        Chin tucks (supine, seated)     2 10s    Upper Trapezius Stretch yes   2 20s    Levator Scapula stretch yes   2 20s    Scalene/SCM stretch yes  2 20 s    Foam roll Pectoralis stretch   Supine shoulder to table        2   10s    Open Book/ thread the needle thoracic rotation stretches   2 20 s    Seated thoracic extension over back of chair with neck support        ROM        SNAGS for rotation        SNAGS for extension        Supine snow angels        Seated thoracic extension                STRENGTHENING         Cervical Stabilization        Cervical isometrics (c/s rot, flex, ext, SB, capital flex)        Resisted chin tucks w/ TB        Cervical retractions prone on elbows        Deep cervical flexor strengthening        Supine Chin tuck    5 5s    Scapular Stabilization        Supine scap retraction /depression is to mat   5 5s    Seated scap retraction/depression        No money (B/L UE ER w/ TB)        TB rows/overhead rows        TB shoulder extension         TB shoulder horiz ABD        Standing chin tuck with shoulder flexion/abduction        W to Y's (supine or standing)          Supine foam roll W to Y    5  5s    Prone T, Y, I         Modified supermans                CARDIOVASCULAR     UBE     Recumbent bike     Treadmill/BCTT     MANUAL  (CPT 22275) Y/N  8-22 Minutes   STM/MFR/TPR/SOT yes SO, UT, LS,    IASTM     Joint Mobilizations     Manual stretching  Contract relax for rotation Yes  n SO, UT,LS    E-STIM UNATTENDED  (CPT ) Y/N  Not performed   TENS/ H-wave      HOT / COLD PACKS  (CPT 15624) Y/N  0-7 Minutes   Heat yes During TA   Ice     THER ACT  (CPT 14639) Y/N  8-22 Minutes   Review pain, meds, falls, vitals, symptoms yes    *Subjective Measures    yes NDI: 36% see media for details   Patient Education/HEP yes 7/13/23: Education regarding anatomy and pathophysiology of BPPV including assessment, treatment methods and post repositioning instructions.issued home epley   7/5/23: Educated regarding important lifestyle habits which contribute to concussion recovery including adequate sleep, cardiovascular exercise, proper nutrition, hydration, and stress reduction. Issued home cervical/thoracic program.  Patient verbalized and demonstrated understanding of education provided.  6/29/23: Pt educated re: plan of care , posture , body mechanics, ergonomics, importance of rest/sleep, gentle exercise, hydration, stress reduction,  gradual incremental return to prior level of function.   Patient verbalized  and demonstrated understanding of education provided.

## 2023-07-24 NOTE — PROGRESS NOTES
Physical Therapy Visit    PT DAILY NOTE FOR OUTPATIENT THERAPY    Patient: Chel Price MRN: [id number]  : 1975 47 y.o.  Referring Physician: Colin Jama MD  Date of Visit: 2023    Certification Dates: 23 through 23    Diagnosis:   1. Concussion without loss of consciousness, sequela (CMS/HCC)    2. Cervicalgia    3. Post-traumatic headache, not intractable, unspecified chronicity pattern        Chief Complaints:  Headache, Neck Pain, Whooziness    Precautions:          TODAY'S VISIT    Time In Session:  Start Time: 1000  Stop Time: 1100  Time Calculation (min): 60 min   History/Vitals/Pain/Encounter Info - 23 1001        Injury History/Precautions/Daily Required Info    Document Type daily treatment     Primary Therapist Will     Chief Complaint/Reason for Visit  Headache, Neck Pain, Whooziness     Onset of Illness/Injury or Date of Surgery 23     Referring Physician Dr. Jama     History of present illness/functional impairment Suddenly when standing up, hit top of head on metal playground bar. Date of Occurence date of occurence: 2023. Loss of Consciousness none. Associated Hospitalizations none. Imaging CT scan; Done 23. Associated Injuries none. Post Traumatic Amnesia absent. Therapy History ; None. Associated Symptoms headaches, sensitivity to sensory stimulation; Nausea, neck pain. Work History ; Working 6 hr/day remotely x 8 years. Took two weeks off of work. Went back to work 23, and has been trying to avoid screen time. Started work at 9 am, by 11 am felt nauseated, had been taking breaks. Got through full day of work, felt worse at the end of the day     Patient/Family/Caregiver Comments/Observations my neck seems to be the only problem for now     Patient reported fall since last visit No        Pain Assessment    Currently in pain No/Denies     Pain Side/Orientation left     Pain: Body location Neck;Shoulder     Pain Rating (0-10): Pre Activity  1        Pain Intervention    Intervention  monitored                Daily Treatment Assessment and Plan - 07/24/23 1001        Daily Treatment Assessment and Plan    Daily Outcome Summary retested BPPV L PC now WNL's subjectively and objectively, utilized long axis CS distraction and SOR, gentle self stretching, completed static balance measure to sharpened rhomberg EC WNL's asymptomatic, initiated VOR per treatment log, instructed and assigned HEP see pt instructions, completed treatment without lasting adverse reaction     Plan and Recommendations pending pt presentation, progress vor adapation /gaze stabilization. and inroduce vor-c . Continue flexibility and strength training for cervical spine and scapular muscles. SOT, FGA, MSQ, GST, DVA, HSSOT, static and dynamic balance training, vor adaptation/gaze stabilization training, habituation training, cardiovascular training.                     OBJECTIVE DATA TAKEN TODAY:    Vestibular     PT Vestibular Evaluation - 07/24/23 1000        BPPV    Right Rachael Hallpike  WNL;Modified with tilt table     Left Rachael Hallpike  WNL;Modified with tilt table        Balance    Rhomberg Eyes Open 60 seconds     Rhomberg Eyes Closed 60 seconds     Sharpened Rhomberg Eyes Open 60 seconds     Sharpened Rhomberg Eyes Closed 30 seconds   tested @ 30 sec, 30/30                Today's Treatment:    VESTIBULAR PT FLOWSHEET    P.T. TREATMENT LOG   Precautions:   Eval Date: 6/29/23 Progress Note: q10v/30d   POC expires: 6/30/23 Insurance Limits: BOMN   Treatment Current Session Time   CANALITH  REPOSITIONING TREATMENT  (CPT 54682) Y/N Notes Not performed   CRT yes See objective information/vestibular specialty flow sheet/media for details.    NEURO RE-ED  (CPT 26350) Y/N Notes 23-37 Minutes   BPPV ASSESSMENT yes See objective information/vestibular specialty flow sheet/media for details.   VOR CANCELLATION            Pt with mod increase in dizziness with 10 cycles   Standing H/VVOR-C   "VOR-C 5 cycles of horizontal , vertical , clockwise , counterclockwise with the following symptoms                     Horizontal:                    Vertical:                    Clockwise:                    Counterclockwise:    Ambulation w/ H/VVOR-C     VOR / GAZE STABILITY NV    Standing H/VVOR Y    Y H VOR @ 60 bpm x 60\" standing 6' from blue board 1\" B 3/10 dizzy  V VOR @ 60 bpm x 60\"  standing 6' from blue board 1\" B 3/10 dizzy   Ambulation w/H/VVOR     H/VVOR on compliant surfaces     Functional VOR     HABITUATION     Ball circles     Repetitive functional movements     BALANCE- STATIC     On floor     Airex foam     Rockerboard     BALANCE- DYNAMIC     Ambulation-head turns/nods     Ambulation - 180 & 360 degree turns     Retro ambulation EO     Ambulation with EC     Obstacles     Tandem     C/S KINESTHESIA/ COORDINATION     MULTITASKING     OKS     *Objective Measures      THER-EX   (CPT 15492) Y/N SETS REPS time 8-22 Minutes   STRETCHING        Chin tucks (supine, seated)     2 10s    Upper Trapezius Stretch Y   2 20s    Levator Scapula stretch yes   2 20s    Scalene/SCM stretch yes  2 20 s    Foam roll Pectoralis stretch   Supine shoulder to table        2   10s    Open Book/ thread the needle thoracic rotation stretches   2 20 s    Seated thoracic extension over back of chair with neck support        ROM        SNAGS for rotation        SNAGS for extension        Supine snow angels        Seated thoracic extension                STRENGTHENING        Cervical Stabilization        Cervical isometrics (c/s rot, flex, ext, SB, capital flex)        Resisted chin tucks w/ TB        Cervical retractions prone on elbows        Deep cervical flexor strengthening        Supine Chin tuck    5 5s    Scapular Stabilization        Supine scap retraction /depression is to mat   5 5s    Seated scap retraction/depression        No money (B/L UE ER w/ TB)        TB rows/overhead rows        TB shoulder extension       "   TB shoulder horiz ABD        Standing chin tuck with shoulder flexion/abduction        W to Y's (supine or standing)          Supine foam roll W to Y    5  5s    Prone T, Y, I         Modified supermans                CARDIOVASCULAR     UBE     Recumbent bike     Treadmill/BCTT     MANUAL  (CPT 49013) Y/N  8-22 Minutes   STM/MFR/TPR/SOT yes SO, UT, LS,    IASTM     Joint Mobilizations     Manual stretching  Contract relax for rotation Yes  n SO, UT,LS    E-STIM UNATTENDED  (CPT ) Y/N  Not performed   TENS/ H-wave      HOT / COLD PACKS  (CPT 66919) Y/N  0-7 Minutes   Heat yes During TA   Ice     THER ACT  (CPT 77518) Y/N  8-22 Minutes   Review pain, meds, falls, vitals, symptoms yes    *Subjective Measures    yes NDI: 36% see media for details   Patient Education/HEP yes 7/24/23: retested BPPV now neg, reviewed result, initiated VOR instructed and assigned HEP  7/13/23: Education regarding anatomy and pathophysiology of BPPV including assessment, treatment methods and post repositioning instructions.issued home epley   7/5/23: Educated regarding important lifestyle habits which contribute to concussion recovery including adequate sleep, cardiovascular exercise, proper nutrition, hydration, and stress reduction. Issued home cervical/thoracic program.  Patient verbalized and demonstrated understanding of education provided.  6/29/23: Pt educated re: plan of care , posture , body mechanics, ergonomics, importance of rest/sleep, gentle exercise, hydration, stress reduction,  gradual incremental return to prior level of function.   Patient verbalized and demonstrated understanding of education provided.

## 2023-07-24 NOTE — OP PT TREATMENT LOG
"VESTIBULAR PT FLOWSHEET    P.T. TREATMENT LOG   Precautions:   Eval Date: 6/29/23 Progress Note: q10v/30d   POC expires: 6/30/23 Insurance Limits: BOMN   Treatment Current Session Time   CANALITH  REPOSITIONING TREATMENT  (CPT 48971) Y/N Notes Not performed   CRT yes See objective information/vestibular specialty flow sheet/media for details.    NEURO RE-ED  (CPT 66561) Y/N Notes 23-37 Minutes   BPPV ASSESSMENT yes See objective information/vestibular specialty flow sheet/media for details.   VOR CANCELLATION            Pt with mod increase in dizziness with 10 cycles   Standing H/VVOR-C  VOR-C 5 cycles of horizontal , vertical , clockwise , counterclockwise with the following symptoms                     Horizontal:                    Vertical:                    Clockwise:                    Counterclockwise:    Ambulation w/ H/VVOR-C     VOR / GAZE STABILITY NV    Standing H/VVOR Y    Y H VOR @ 60 bpm x 60\" standing 6' from blue board 1\" B 3/10 dizzy  V VOR @ 60 bpm x 60\"  standing 6' from blue board 1\" B 3/10 dizzy   Ambulation w/H/VVOR     H/VVOR on compliant surfaces     Functional VOR     HABITUATION     Ball circles     Repetitive functional movements     BALANCE- STATIC     On floor     Airex foam     Rockerboard     BALANCE- DYNAMIC     Ambulation-head turns/nods     Ambulation - 180 & 360 degree turns     Retro ambulation EO     Ambulation with EC     Obstacles     Tandem     C/S KINESTHESIA/ COORDINATION     MULTITASKING     OKS     *Objective Measures      THER-EX   (CPT 03399) Y/N SETS REPS time 8-22 Minutes   STRETCHING        Chin tucks (supine, seated)     2 10s    Upper Trapezius Stretch Y   2 20s    Levator Scapula stretch yes   2 20s    Scalene/SCM stretch yes  2 20 s    Foam roll Pectoralis stretch   Supine shoulder to table        2   10s    Open Book/ thread the needle thoracic rotation stretches   2 20 s    Seated thoracic extension over back of chair with neck support        ROM        SNAGS " for rotation        SNAGS for extension        Supine snow angels        Seated thoracic extension                STRENGTHENING        Cervical Stabilization        Cervical isometrics (c/s rot, flex, ext, SB, capital flex)        Resisted chin tucks w/ TB        Cervical retractions prone on elbows        Deep cervical flexor strengthening        Supine Chin tuck    5 5s    Scapular Stabilization        Supine scap retraction /depression is to mat   5 5s    Seated scap retraction/depression        No money (B/L UE ER w/ TB)        TB rows/overhead rows        TB shoulder extension         TB shoulder horiz ABD        Standing chin tuck with shoulder flexion/abduction        W to Y's (supine or standing)          Supine foam roll W to Y    5  5s    Prone T, Y, I         Modified supermans                CARDIOVASCULAR     UBE     Recumbent bike     Treadmill/BCTT     MANUAL  (CPT 23954) Y/N  8-22 Minutes   STM/MFR/TPR/SOT yes SO, UT, LS,    IASTM     Joint Mobilizations     Manual stretching  Contract relax for rotation Yes  n SO, UT,LS    E-STIM UNATTENDED  (CPT ) Y/N  Not performed   TENS/ H-wave      HOT / COLD PACKS  (CPT 40150) Y/N  0-7 Minutes   Heat yes During TA   Ice     THER ACT  (CPT 32569) Y/N  8-22 Minutes   Review pain, meds, falls, vitals, symptoms yes    *Subjective Measures    yes NDI: 36% see media for details   Patient Education/HEP yes 7/24/23: retested BPPV now neg, reviewed result, initiated VOR instructed and assigned HEP  7/13/23: Education regarding anatomy and pathophysiology of BPPV including assessment, treatment methods and post repositioning instructions.issued home epley   7/5/23: Educated regarding important lifestyle habits which contribute to concussion recovery including adequate sleep, cardiovascular exercise, proper nutrition, hydration, and stress reduction. Issued home cervical/thoracic program.  Patient verbalized and demonstrated understanding of education  provided.  6/29/23: Pt educated re: plan of care , posture , body mechanics, ergonomics, importance of rest/sleep, gentle exercise, hydration, stress reduction,  gradual incremental return to prior level of function.   Patient verbalized and demonstrated understanding of education provided.

## 2023-07-24 NOTE — PATIENT INSTRUCTIONS
"Gaze Stabilization: Tip Card    1.Target must remain in focus, not blurry, and appear stationary while head is in motion.  2.Perform exercises with small head movements (25° to either side of midline).  3.Increase speed of head motion so long as target is in focus.  4.If you wear eyeglasses, be sure you can see target through lens (therapist will give specific instructions for bifocal / progressive lenses).  5.These exercises may provoke dizziness or nausea. Work through these symptoms. If too dizzy, slow head movement slightly. Rest between each exercise.  6.Exercises demand concentration; avoid distractions.  7.For safety, perform standing exercises close to a counter, wall, corner, or next to someone.    Copyright © [employer]. All rights reserved.      Gaze Stabilization: Standing Feet Together        Use \"metronome\" berry to manage tempo and duration.    Feet together, keeping eyes on target on wall 4-6 feet away, tilt head down 15-30° and move head side to side for 60 seconds. Repeat while moving head up and down for 60 seconds.  Do 1-3 sessions per day.  Begin @ 65 bpm if < 2/5 dizzy, progress speed by 5 bpm  Repeat using simple target on plain background.    Copyright © [employer]. All rights reserved.      "

## 2023-07-25 ENCOUNTER — HOSPITAL ENCOUNTER (OUTPATIENT)
Dept: OCCUPATIONAL THERAPY | Facility: REHABILITATION | Age: 48
Setting detail: THERAPIES SERIES
Discharge: HOME | End: 2023-07-25
Attending: STUDENT IN AN ORGANIZED HEALTH CARE EDUCATION/TRAINING PROGRAM
Payer: COMMERCIAL

## 2023-07-25 DIAGNOSIS — H54.7 FUNCTIONAL VISION PROBLEM: Primary | ICD-10-CM

## 2023-07-25 DIAGNOSIS — S06.0X0S CONCUSSION WITHOUT LOSS OF CONSCIOUSNESS, SEQUELA (CMS/HCC): ICD-10-CM

## 2023-07-25 PROCEDURE — 97530 THERAPEUTIC ACTIVITIES: CPT | Mod: GO

## 2023-07-25 PROCEDURE — 97112 NEUROMUSCULAR REEDUCATION: CPT | Mod: GO

## 2023-07-25 NOTE — OP OT TREATMENT LOG
OT Vision/mTBI  EXERCISES CURRENT SESSION TIME   NEURO RE-ED  CPT 67907 TOTAL TIME FOR SESSION 25 minutes    [employer]       VTS3/VTS4       CPT       BITs       NVR      Saccadic Fixation Assessed; see flowsheet     Ocular Motor Control Assessed; see flowsheet     Visual Tracing      3D Tracking      Visual Perception      Convergence/Divergence Assessed; see flowsheet     Accommodation Assessed; see flowsheet     Visual Stimulation Assessed; see flowsheet     THER ACT  CPT 80350 TOTAL TIME FOR SESSION 10 minutes   Assessment       Pain, Vitals, Meds, Etc. Assessed; monitored, reviewed     HEP Reports good compliance with HEP to date - gets some eye strain but does not provoke full HA. Encouraged to incorporate personal challenges with delayed recall - making lists, errands, etc - using compensatory strategies      CURRENT HEP =      - Ocular ROM and Gaze Fixations  - Saccades  - Visual Tracing     Visual Endurance        Work/School Simulation        SELF CARE  CPT 93645 TOTAL TIME FOR SESSION 10 Minutes    PCS Symptom Management/Education Educated re: results of objective assessment and how these relate to functional deficit areas. Reviewed goals with pt, with plan to discharge 8/9 as long as return to work is a successful transition. Focusing next session on return to work readiness.    Reinforced education re: OT role s/p mTBI to address functional visual system, importance of balancing provocation of symptoms w/ rest and recovery. Discussed nature of mTBI and impact on sympathetic nervous system regulation w/ emphasis on energy conservation technique implementation. Patient verbalizes good understanding of all education provided.      ADL/IADLs       GROUP  CPT 19290 TOTAL TIME FOR SESSION

## 2023-07-25 NOTE — PROGRESS NOTES
Occupational Therapy Progress Note    OT PROGRESS NOTE FOR OUTPATIENT THERAPY    Patient: Chel Price MRN: [id number]  : 1975 47 y.o.  Referring Physician: Colin Jama MD  Date of Visit: 2023      Certification Dates:   23 through 23    Recommended Frequency & Duration:  1 time/week for up to 8 weeks        Diagnosis:   1. Functional vision problem    2. Concussion without loss of consciousness, sequela (CMS/[facility])        Chief Complaints:  Chief Complaint   Patient presents with   • Visual Deficits   • Cognition       Precautions:  Existing Precautions/Restrictions: no known precautions/restrictions    TODAY'S VISIT:    Time In Session:  Start Time: 1615  Stop Time: 1700  Time Calculation (min): 45 min   General Information - 23 1619        Session Details    Document Type progress note     Mode of Treatment individual therapy        General Information    Onset of Illness/Injury or Date of Surgery 23     Referring Physician Wiley     History of present illness/functional impairment Chel Price is a 48 y/o gentlelady with a history of anxiety, ADHD, who presents to concussion clinic for evaluation of an injury sustained on 23.   She stood up suddenly while at the playground with her child on 23 and hit the top of her head on playground equipment. There was no loss of consciousness. She developed headache and nausea the next day suddenly. She saw her PCP and was referred for a CT scan of the head which was unremarkable for acute intracranial pathology. She developed worsening symptoms and took time off of work. She returned to work and did notice worsening light sensitivity, screen intolerance, nausea, and feeling worse the following day. She is typically very active, walking a lot during the day and has been hesitant to return to her usual activity. She does have a history of migraine and sees Dr. Fox. She takes Emgality and Ubrelvy. She has headaches and  "neck pain now which is different than her usual headache. This left sided neck pain is described as a tightness and ache and her head feels \"fuzzy\" with tingling and zapping sensations on the left side of her head. She does have some nausea with this type of headache. She takes ibuprofen and has tried topical heat and is has helped. She does have flexeril at home but hasn't used it for neck pain or headache. She notes trouble falling asleep. Uses teas and natural remedies to help with sleep. Has tried melatonin without much effect.  History of anxiety on lexapro, ADHD on Vyvanse 20 mg. Sees NATALYA Lake at [facility]. Feeling executive dysfunction worse over the past two weeks. Still gets her menstrual period, LMP was 5/18/23, utilizing different forms of birth control.   Driving: No issues.   No prior concussions.  No litigation.  Allergies include penicillins and sulfa Medical history includes asthma, migraines, and perioral dermatitis Her surgical history includes cholecystectomy  Her family history is significant for thyroid disease and hypertension in her mother, and depression in her sister. She is working as a  She does not drink alcohol or use tobacco.     Patient/Family/Caregiver Comments/Observations Arrives with reports of improved symptoms over the last few weeks; feeling as though the longlasting symptom is apathy/emotional disturbance and this is being monitored by MD.     Existing Precautions/Restrictions no known precautions/restrictions         Services    Do You Speak a Language Other Than English at Home? no        Behavioral Health Related Communication    Suicidal Ideation No                Daily Falls Screen - 07/25/23 1619        Daily Falls Assessment    Patient reported fall since last visit No                Pain/Vitals - 07/25/23 1619        Pain Assessment    Currently in pain Yes     Preferred Pain Scale number (Numeric Rating Pain Scale)     Pain: " Body location Head;Neck     Pain Rating (0-10): Pre Activity 2        Pain Interventions    Intervention  monitored; encouraged rest breaks     Post Intervention Comments mild inc in symptoms;                OT - 07/25/23 1619        Occupational Therapy Encounter Type Details    Occupational Therapy Specialty MTBI OT        OT Frequency and Duration    Frequency of treatment 1 time/week     OT Duration 8 weeks     OT Cert From 06/21/23     OT Cert To 08/20/23     Date OT POC was sent to provider 06/21/23     Signed OT Plan of Care received?  Yes                Assessment - 07/25/23 1619        Assessment    Plan of Care reviewed and patient/family in agreement Yes     System Pathology/Pathophysiology Noted neuromuscular     Functional Limitations in Following Categories work;home management;community/leisure     Problem List: Occupational Therapy functional vision     Actions taken Physcial therapy eval for vestibular evaluation on 7/5     Rehab Potential/Prognosis: Occupational Therapy good, to achieve stated therapy goals     Clinical Assessment Chel Price is a 48 y/o female who presents to OP OT following concussion on 5/28 for initial evaluation to address ongoing PCS. 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 Pt reports she is now able to tolerate 60 minutes ( 30 minutes) of computer use as this time and is experiencing inc in fatigue impacting her ability to complete IADL tasks and particiapte in leisure/exercise. Pt scores 12/64 (from 29/64) on [facility] PCS Questionnaire with reports of mild fatigue, moderate headaches, nausea, cognitive changes. Objectively, pt demosntrates poor ocular motor control and motor planning, discomfort with superior and inferior gaze fixations, improved saccadic fixation as evidenced by a Gerber Devick Score of 39 (from 60s) (norm=52), horizontal saccades screening avg 5 sec (from 7.2 s) and vertical saccades 4s (from 5.4 s) (norm =4.5-5.5 s). Pt is WNL for  covergence and accommodation however notes strain, ARTEAGA provoked following assessment. Occupational Therapy services are warranted at this time to address the aforementioned deficits impacting patients ability to return to work full time and engage in IADL and preferred leisure tasks.     Plan and Recommendations Continue OT POC 1x week for 6-8 weeks with focus on functional vision including saccadic fixation, ocular motor control, gaze fixations, computer use and multi-stim habituation with goal of return to work full time.                 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 Yes     Right eye assessed? Yes     Superior assessed? Yes     Inferior assessed? Yes     Diagonal assessed? Yes     Circular assessed? Yes     Left AROM without target ROM Intact     Left oculomotor AROM Discomfort Mild     Left gaze fixation (10 second hold) Able      Right AROM without target ROM Intact      Right oculomotor AROM Discomfort None     Right gaze fixation (10 second hold) Able     Superior AROM without target ROM Intact      Superior  oculomotor AROM Discomfort None     Superior gaze fixation (10 second hold) Able     Inferior AROM without target ROM Intact     Inferior oculomotor AROM Discomfort None     Inferior gaze fixation (10 second hold) Able     Diagonal AROM without target ROM Intact     Diagonal oculomotor AROM Discomfort Mild     Diagonal gaze fixation (10 second hold) Able     Circular AROM without target ROM Intact     Circular oculomotor AROM Discomfort None        Eye Teaming    Convergence Intact   3 inches    Divergence Intact     Accommodation Intact     Smooth Pursuits Intact     3D Tracking Intact     Nystagmus No        Saccadic Fixation Speed    Horizontal Saccades H1 5.2 Seconds     Horizontal Saccades H2 5.3 Seconds     Horizontal Saccades H3 5.5 Seconds     Horizontal Saccades H4 5.4 Seconds     Horizontal Saccades trials average 5.35 Seconds     Vertical Saccades V1 4.13 Seconds     Vertical Saccades V2 4.14 Seconds     Vertical Saccades V3 4.2 Seconds     Vertical Saccades V4 4.3 Seconds     Vertical Saccades trials average 4.19 Seconds     Gerber Devick Test #1 (seconds) 13.7 Seconds     Gerber Devick Test #2 (seconds) 13.31 Seconds     Gerber Devick Test #3 (seconds) 12.85 Seconds     Gerber Devick Total Time 39.86 Seconds     Gerber Devick Errors #1 0     Gerber Devick Errors #2 0     Gerber Devick Errors #3 0     Gerber Devick Total Errors 0        Symptoms and Outcome Measures    Symptoms Fatigue;Fogginess;Headache;Slowed processing;Cognitive changes     [facility] 12/64                   Outcome Measures        6/21/2023    08:07 6/30/2023    12:13 7/25/2023    16:19   OT OBJECTIVE Outcome Measures   Gerber Devick Total Time 59.6 Seconds  39.86 Seconds   Gerber Devick Total Errors 0  0   Dynavision - Targets  86    Dynavision - Seconds  0.7 Seconds    Dynavision - Impression  WFL    OT SUBJECTIVE Outcome Measures   [facility] 29/64 12/64       Today's Treatment::    Education provided:  Yes: See treatment log for details of education  provided       OT Vision/mTBI  EXERCISES CURRENT SESSION TIME   NEURO RE-ED  CPT 15183 TOTAL TIME FOR SESSION 25 minutes    Dynavision       VTS3/VTS4       CPT       BITs       NVR      Saccadic Fixation Assessed; see flowsheet     Ocular Motor Control Assessed; see flowsheet     Visual Tracing      3D Tracking      Visual Perception      Convergence/Divergence Assessed; see flowsheet     Accommodation Assessed; see flowsheet     Visual Stimulation Assessed; see flowsheet     THER ACT  CPT 07936 TOTAL TIME FOR SESSION 10 minutes   Assessment       Pain, Vitals, Meds, Etc. Assessed; monitored, reviewed     HEP Reports good compliance with HEP to date - gets some eye strain but does not provoke full HA. Encouraged to incorporate personal challenges with delayed recall - making lists, errands, etc - using compensatory strategies      CURRENT HEP =      - Ocular ROM and Gaze Fixations  - Saccades  - Visual Tracing     Visual Endurance        Work/School Simulation        SELF CARE  CPT 52213 TOTAL TIME FOR SESSION 10 Minutes    PCS Symptom Management/Education Educated re: results of objective assessment and how these relate to functional deficit areas. Reviewed goals with pt, with plan to discharge 8/9 as long as return to work is a successful transition. Focusing next session on return to work readiness.    Reinforced education re: OT role s/p mTBI to address functional visual system, importance of balancing provocation of symptoms w/ rest and recovery. Discussed nature of mTBI and impact on sympathetic nervous system regulation w/ emphasis on energy conservation technique implementation. Patient verbalizes good understanding of all education provided.      ADL/IADLs       GROUP  CPT 51666 TOTAL TIME FOR SESSION                              Goals Addressed                 This Visit's Progress    • OT- PCS   On track     MTBI GOALS  Short Term Goals Time Frame Result Comment   Full ocular motor range of motion and  control with mild symptoms 4 weeks Met    Convergence less than or equal to 6 inches for near-focus tasks of reading and computer use with mild symptoms 4 weeks Met    Demonstrate fair tolerance for normal volume and intensity of visual stimulation as demonstrated by only mild symptoms after 20 minutes engagement in activity in moderate-high mult-stim environment 4 weeks Met    Visual reaction time within or less than 0.75 seconds via Dynavision with mild symptoms 4 weeks Met    Saccadic fixation speed of less than 52 seconds as measured by the Gerber Devick Test with mild symptoms 4 weeks Met    Visual perceptual skills via MVPT-4 with a standard score equivalent to age range 6 weeks N/A    Patient will perform IADLs and community activities with mild symptoms as evidenced by a 10 point reduction on the [facility] Post Concussion Symptoms Questionnaire 4 weeks Met 29 to 12   Patient will be independent with visual home exercise program 4 weeks Met        Long Term Goals Time Frame Result Comment   Demonstrate good tolerance for normal volume and intensity of visual stimulation as evidenced by report of absent to manageable symptoms after 60 minutes engagement in activity in moderate-high multi-stim environment 8 weeks Ongoing    Patient will complete necessary reading for work/leisure/school, with accommodations if indicated, as evidenced by reported ability to read for 60 minutes, with absent or manageable symptoms 8 weeks Ongoing    Patient will utilize computer for work/leisure/school tasks, with accommodations if indicated, as evidenced by reported ability to use computer for 1 hour, with absent or manageable symptoms 8 weeks Ongoing    Patient will return to work/school with full or modified duties with absent or manageable symptoms 8 weeks Ongoing    Patient will perform IADLs and community activities with manageable symptoms as measured by a 20 point reduction on the [facility] Post Concussion Symptoms  Questionnaire. 8 weeks Ongoing

## 2023-08-02 ENCOUNTER — HOSPITAL ENCOUNTER (OUTPATIENT)
Dept: PHYSICAL THERAPY | Facility: REHABILITATION | Age: 48
Setting detail: THERAPIES SERIES
Discharge: HOME | End: 2023-08-02
Attending: STUDENT IN AN ORGANIZED HEALTH CARE EDUCATION/TRAINING PROGRAM
Payer: COMMERCIAL

## 2023-08-02 ENCOUNTER — HOSPITAL ENCOUNTER (OUTPATIENT)
Dept: OCCUPATIONAL THERAPY | Facility: REHABILITATION | Age: 48
Setting detail: THERAPIES SERIES
Discharge: HOME | End: 2023-08-02
Attending: STUDENT IN AN ORGANIZED HEALTH CARE EDUCATION/TRAINING PROGRAM
Payer: COMMERCIAL

## 2023-08-02 DIAGNOSIS — M54.2 CERVICALGIA: ICD-10-CM

## 2023-08-02 DIAGNOSIS — S06.0X0S CONCUSSION WITHOUT LOSS OF CONSCIOUSNESS, SEQUELA (CMS/HCC): ICD-10-CM

## 2023-08-02 DIAGNOSIS — H54.7 FUNCTIONAL VISION PROBLEM: Primary | ICD-10-CM

## 2023-08-02 DIAGNOSIS — M54.2 CERVICALGIA: Primary | ICD-10-CM

## 2023-08-02 DIAGNOSIS — G44.309 POST-TRAUMATIC HEADACHE, NOT INTRACTABLE, UNSPECIFIED CHRONICITY PATTERN: ICD-10-CM

## 2023-08-02 PROCEDURE — 97530 THERAPEUTIC ACTIVITIES: CPT | Mod: GP

## 2023-08-02 PROCEDURE — 97110 THERAPEUTIC EXERCISES: CPT | Mod: GP

## 2023-08-02 PROCEDURE — 97112 NEUROMUSCULAR REEDUCATION: CPT | Mod: GO

## 2023-08-02 PROCEDURE — 97112 NEUROMUSCULAR REEDUCATION: CPT | Mod: GP

## 2023-08-02 PROCEDURE — 97530 THERAPEUTIC ACTIVITIES: CPT | Mod: GO

## 2023-08-02 NOTE — PROGRESS NOTES
Physical Therapy Progress Note    PT PROGRESS NOTE FOR OUTPATIENT THERAPY    Patient: Chel Price   MRN: [id number]  : 1975 47 y.o.    Referring Physician: Colin Jama MD  Date of Visit: 2023    Certification Dates: 23 through 23    Recommended Frequency & Duration:  2 times/week for up to 3 months        Diagnosis:   1. Cervicalgia    2. Concussion without loss of consciousness, sequela (CMS/HCC)    3. Post-traumatic headache, not intractable, unspecified chronicity pattern        Chief Complaints:  Chief Complaint   Patient presents with   • Pain     Neck         Precautions:        TODAY'S VISIT:    Time In Session:  Start Time: 1000  Stop Time: 1100  Time Calculation (min): 60 min   General Information - 23 1030        General Information    Onset of Illness/Injury or Date of Surgery --     Referring Physician --                Daily Falls Screen - 23 1030        Daily Falls Assessment    Patient reported fall since last visit No                Pain/Vitals - 23 1030        Pain Assessment    Currently in pain Yes     Preferred Pain Scale number (Numeric Rating Pain Scale)     Pain Rating (0-10): Pre Activity 4        Pain Intervention    Intervention  heat and manual therapy     Post Intervention Comments feels better                PT - 23 1030        Physical Therapy    Physical Therapy Specialty [facility] PT        PT Plan    Frequency of treatment 2 times/week     PT Duration 3 months     PT Cert From 23     PT Cert To 23     Signed PT Plan of Care received?  Yes                   OBJECTIVE MEASUREMENTS/DATA:       Vestibular Outcome Measures        7/10/2023    11:00 2023    08:00 2023    10:00   Vestibular   Corrective lenses Progressives     Eye ROM WNL     Convergence Vision WNL     Divergent Vision WNL     Pupil Alignment WNL     Cover/Uncover WNL     Cover/Cross Over WNL     Smooth Pursuit/Small Target WNL     Saccades WNL      Vestibular Ocular Reflex (VOR) Abnormal     Comments tiny bit dizzy     Gaze-Evoked Nystagmus WNL     VOR Cancellation Abnormal     Right [place] Hallpike  WNL  WNL;Modified with tilt table   Left Rachael Hallpike  Abnormal Abnormal WNL;Modified with tilt table   Left Rachael Hallpike Comments upbeating left torsion with mild symptoms trace left torsion /upbeat    Sit to Supine WNL     Horizontal Canal/Roll Test WNL     Horizontal Roll Test to Right  WNL     Horizontal Roll Test to Left  WNL     Interpretation of Results L PC canalithiasis mild L PC canalithiasis    BPPV Treatment Modified Left Epley . modified L epley    Post CRT Instructions  Yes       issued Home Epleys    Rhomberg Eyes Open   60 seconds   Rhomberg Eyes Closed   60 seconds   Sharpened Rhomberg Eyes Open   60 seconds   Sharpened Rhomberg Eyes Closed   30 seconds       tested @ 30 sec, 30/30       ROM and MMT        6/29/2023    09:00   PT Cervical/Lumbar/Other ROM Measurements   AROM: Cervical Flexion 48   AROM: Cervical Extension 53   AROM: Left Cervical SB 25   AROM: Right Cervical SB 30   AROM: Left Cervical Rotation 40   AROM: Right Cervical Rotation 55     Outcome Measures        6/29/2023    09:00   PT SUBJECTIVE Outcome Measures   NDI 36       Today's Treatment::    Education provided:  Yes: See treatment log for details of education provided  Methods: Discussion and Handout  Readiness: acceptance  Response: Verbalizes understanding    VESTIBULAR PT FLOWSHEET    P.T. TREATMENT LOG   Precautions:   Eval Date: 6/29/23 Progress Note: q10v/30d   POC expires: 6/30/23 Insurance Limits: BOMN   Treatment Current Session Time   CANALITH  REPOSITIONING TREATMENT  (CPT 48454) Y/N Notes Not performed   CRT   See objective information/vestibular specialty flow sheet/media for details.    NEURO RE-ED  (CPT 70107) Y/N Notes 23-37 Minutes   BPPV ASSESSMENT   See objective information/vestibular specialty flow sheet/media for details.   VOR CANCELLATION            Pt  "with mod increase in dizziness with 10 cycles   Standing H/VVOR-C  VOR-C 5 cycles of horizontal , vertical , clockwise , counterclockwise with the following symptoms                     Horizontal:                    Vertical:                    Clockwise:                    Counterclockwise:    Ambulation w/ H/VVOR-C     VOR / GAZE STABILITY NV    Standing H/VVOR   H VOR @ 60 bpm x 60\" standing 6' from blue board 1\" B 3/10 dizzy  V VOR @ 60 bpm x 60\"  standing 6' from blue board 1\" B 3/10 dizzy   Ambulation w/H/VVOR     H/VVOR on compliant surfaces     Functional VOR     HABITUATION     Ball circles     Repetitive functional movements     BALANCE- STATIC     On floor     Airex foam     Rockerboard     BALANCE- DYNAMIC     Ambulation-head turns/nods     Ambulation - 180 & 360 degree turns     Retro ambulation EO     Ambulation with EC     Obstacles     Tandem     C/S KINESTHESIA/ COORDINATION     MULTITASKING     OKS     *Objective Measures      THER-EX   (CPT 36822) Y/N SETS REPS time 8-22 Minutes   STRETCHING        Chin tucks (supine, seated)     2 10s    Upper Trapezius Stretch Y   2 20s    Levator Scapula stretch yes   2 20s    Scalene/SCM stretch yes  2 20 s            Foam roll Pectoralis stretch   Supine shoulder to table        2   10s    Open Book/ thread the needle thoracic rotation stretches   2 20 s    Seated thoracic extension over back of chair with neck support        ROM        SNAGS for rotation        SNAGS for extension        Supine snow angels        Seated thoracic extension                STRENGTHENING        Cervical Stabilization        Cervical isometrics (c/s rot, flex, ext, SB, capital flex)        Resisted chin tucks w/ TB        Cervical retractions prone on elbows        Deep cervical flexor strengthening        Supine Chin tuck    5 5s    Scapular Stabilization        Supine scap retraction /depression is to mat   5 5s    Seated scap retraction/depression        No money (B/L UE ER " w/ TB)        TB rows/overhead rows        TB shoulder extension         TB shoulder horiz ABD        Standing chin tuck with shoulder flexion/abduction        W to Y's (supine or standing)          Supine foam roll W to Y    5  5s    Prone T, Y, I         Modified supermans                CARDIOVASCULAR     UBE     Recumbent bike     Treadmill/BCTT     MANUAL  (CPT 90573) Y/N  23-37 Minutes   STM/MFR/TPR/SOT yes SO, UT, LS, SCM    IASTM     Joint Mobilizations yes Gentle rotational for left rotation   Manual stretching  Contract relax for rotation Yes  n SO, UT,LS, SCM     E-STIM UNATTENDED  (CPT ) Y/N  Not performed   TENS/ H-wave      HOT / COLD PACKS  (CPT 38176) Y/N  0-7 Minutes   Heat yes During TA and subjective   Ice     THER ACT  (CPT 30267) Y/N  8-22 Minutes   Review pain, meds, falls, vitals, symptoms yes    *Subjective Measures    yes NDI: 36% see media for details   Patient Education/HEP yes 8/2/23: Educated regarding purpose of myofascial release.  Pt verbalized understanding of education provided. 7/24/23: retested BPPV now neg, reviewed result, initiated VOR instructed and assigned HEP  7/13/23: Education regarding anatomy and pathophysiology of BPPV including assessment, treatment methods and post repositioning instructions.issued home epley   7/5/23: Educated regarding important lifestyle habits which contribute to concussion recovery including adequate sleep, cardiovascular exercise, proper nutrition, hydration, and stress reduction. Issued home cervical/thoracic program.  Patient verbalized and demonstrated understanding of education provided.  6/29/23: Pt educated re: plan of care , posture , body mechanics, ergonomics, importance of rest/sleep, gentle exercise, hydration, stress reduction,  gradual incremental return to prior level of function.   Patient verbalized and demonstrated understanding of education provided.        Goals Addressed                 This Visit's Progress    • PT  cervical and vestiibular goals        Short Term Goals Time Frame Result Comment/  Progress   Pt will improve score on the NDI to at least 5 points lower than baseline assessment inc ability to perform ADLs and functional tasks with dec limitations from c-spine. 6wk TBA    Pt will report no greater than 3/10 pain in neck at rest inc tolerance for ADLs.  6wk progressing    Pt will inc c-spine AROM to  45 deg flexion, 50 deg extension, 40 deg SB, 65 deg Rot. 6wk TBA    Pt will report dec HA to no greater than 4 within the past week to improve tolerance for ADLs. 6wk Met    Pt will be independent with established HEP 6wk progressing    Pt will report c-spine pain no greater than 2-3/10 on average to improve ability to perform ADLs and functional mobility.    6wk progressing       Long Term Goals Time Frame Result Comment/Progress   Pt will dec score on the NDI to <15 indicating no greater than a mild disability and improved ability to perform ADLs and functional tasks with decreased limitations from neck. 12wk     Pt will report no c-spine pain at rest and no greater than 1/10 pain with activity to inc tolerance for ADLs.   12wk     Pt will inc c-spine AROM to WNL 50 deg flexion, 60 deg extension, 45 deg SB, 75 deg rotaion to improve ability to improve ADLs.  12wk     Pt will report HA no greater than 2 over the past week to improve tolerance for ADLs. 12wk     Pt will be independent with postural correction for pain management and inc tolerance for activities in sitting and standing.      Pt will be independent with established HEP      Resume ADLs to 90% capacity without increase in neck pain. 12wk                     Goals Short-Long Time Frame       Result Comment   SHORT TERM GOALS       1. Romberg, Sharpened Romberg and Single Limb Stance TBA with eyes opened and closed for postural stability Short Term 4-6  weeks  Met    2. Patient's motion sensitivity quotient (MSQ) TBA to determine functional movement  tolerance. Short Term 4-6 weeks TBA    3. Patient's Balance Master SOT score TBA to objectively assess equilibrium and postural control (score < 38 increases likelihood ratio of falls; MDC 8 points) Short Term 4-6 weeks TBA    4. FGA TBA to measure gait stability and fall risk. Short Term 4-6 weeks TBA    5. DVA TBA to objectively determine functional visual acuity with head movement. Short Term 4-6 weeks TBA    6. GST (Gaze Stability Test) TBA to determine functional level gaze stability. Short Term 4-6 weeks TBA    7. Patient will perform home exercise program with supervision and cues. Short Term 4-6 weeks progressing    8. Patient will tolerate 10 minutes of cardiovascular conditioning exercise with vital signs stable  Short Term 4-6 weeks TBA    9. Patient will have negative BPPV all canals for symptom-free functional mobility. Short Term 4-6 weeks Met    LONG TERM GOALS       1. Patient will improve Static Romberg, Sharpened Romberg and SLS to WNL for age with eyes opened and eyes closed for improved postural stability  Long Term 8-12 weeks      1. Patient will decrease motion sensitivity quotient to <2 for increased tolerance to functional movement. (2% or less =WNL)   Long Term 8-12 weeks     2. Patient will increase Balance Manager SOT score to WNL for improved equilibrium and postural control (score < 38 increases likelihood ratio of falls; MDC 8 points) Long Term 8-12 weeks     3. Patient will improve score on HSSSOT to WNL ( >70% Ratio head turn/No head turn equilibrium with eyes closed) to demonstrate improved equilibrium with head movement. Long Term 8-12  weels     4. Patient will increase FGA score to >/= 24/30     for maximal gait stability, safety and functional mobility. (fall risk </=22/30; MDC 6 points for vestibular diagnoses) Long Term 8-12 weeks     5. Patient will improve DVA to </=   2    line difference for functional level visual acuity with head movement.  (Norm= 2 lines or less) Long  Term 8-12 weeks     6. Patient will achieve   WNL   on GST (Gaze Stability Test) for functional/high level gaze stability. Long Term 8-12 weeks     7. Pt will be independent with HEP Long Term 8-12 weeks     8. Patient will tolerate 15 minutes of cardiovascular conditioning exercise with vital signs stable and no abnormal symptoms. Long Term 8-12 weeks     9. Patient will have negative BPPV all canals for symptom-free functional mobility. Long Term 8-12 weeks     10. Patient will return to household, community, and recreational activities of daily living.  Long Term 8-12 weeks     11. Patient will improve outcome measure   ABC > 80%, DHI  <10%    reflecting decreased symptoms and improved participation in functional activity.  ABC:(>/= 80% indicative of increased function and decreased fall risk)  DHI: (18 points= MCID) Long Term 8-12 weeks     12.Patient will demonstrate independence with managing any residual symptoms. Long Term 8-12  weeks     13. Patient will have no abnormal symptoms with challenging VOR activities to allow symptom free high level functional activity. Long Term 8-12 weeks                   Michael Dunlap, PT

## 2023-08-02 NOTE — OP PT TREATMENT LOG
"VESTIBULAR PT FLOWSHEET    P.T. TREATMENT LOG   Precautions:   Eval Date: 6/29/23 Progress Note: q10v/30d   POC expires: 6/30/23 Insurance Limits: BOMN   Treatment Current Session Time   CANALITH  REPOSITIONING TREATMENT  (CPT 83478) Y/N Notes Not performed   CRT   See objective information/vestibular specialty flow sheet/media for details.    NEURO RE-ED  (CPT 74474) Y/N Notes 23-37 Minutes   BPPV ASSESSMENT   See objective information/vestibular specialty flow sheet/media for details.   VOR CANCELLATION            Pt with mod increase in dizziness with 10 cycles   Standing H/VVOR-C  VOR-C 5 cycles of horizontal , vertical , clockwise , counterclockwise with the following symptoms                     Horizontal:                    Vertical:                    Clockwise:                    Counterclockwise:    Ambulation w/ H/VVOR-C     VOR / GAZE STABILITY NV    Standing H/VVOR   H VOR @ 60 bpm x 60\" standing 6' from blue board 1\" B 3/10 dizzy  V VOR @ 60 bpm x 60\"  standing 6' from blue board 1\" B 3/10 dizzy   Ambulation w/H/VVOR     H/VVOR on compliant surfaces     Functional VOR     HABITUATION     Ball circles     Repetitive functional movements     BALANCE- STATIC     On floor     Airex foam     Rockerboard     BALANCE- DYNAMIC     Ambulation-head turns/nods     Ambulation - 180 & 360 degree turns     Retro ambulation EO     Ambulation with EC     Obstacles     Tandem     C/S KINESTHESIA/ COORDINATION     MULTITASKING     OKS     *Objective Measures      THER-EX   (CPT 04860) Y/N SETS REPS time 8-22 Minutes   STRETCHING        Chin tucks (supine, seated)     2 10s    Upper Trapezius Stretch Y   2 20s    Levator Scapula stretch yes   2 20s    Scalene/SCM stretch yes  2 20 s            Foam roll Pectoralis stretch   Supine shoulder to table        2   10s    Open Book/ thread the needle thoracic rotation stretches   2 20 s    Seated thoracic extension over back of chair with neck support        ROM        SNAGS " for rotation        SNAGS for extension        Supine snow angels        Seated thoracic extension                STRENGTHENING        Cervical Stabilization        Cervical isometrics (c/s rot, flex, ext, SB, capital flex)        Resisted chin tucks w/ TB        Cervical retractions prone on elbows        Deep cervical flexor strengthening        Supine Chin tuck    5 5s    Scapular Stabilization        Supine scap retraction /depression is to mat   5 5s    Seated scap retraction/depression        No money (B/L UE ER w/ TB)        TB rows/overhead rows        TB shoulder extension         TB shoulder horiz ABD        Standing chin tuck with shoulder flexion/abduction        W to Y's (supine or standing)          Supine foam roll W to Y    5  5s    Prone T, Y, I         Modified supermans                CARDIOVASCULAR     UBE     Recumbent bike     Treadmill/BCTT     MANUAL  (CPT 44239) Y/N  23-37 Minutes   STM/MFR/TPR/SOT yes SO, UT, LS, SCM    IASTM     Joint Mobilizations yes Gentle rotational for left rotation   Manual stretching  Contract relax for rotation Yes  n SO, UT,LS, SCM     E-STIM UNATTENDED  (CPT ) Y/N  Not performed   TENS/ H-wave      HOT / COLD PACKS  (CPT 96350) Y/N  0-7 Minutes   Heat yes During TA and subjective   Ice     THER ACT  (CPT 62037) Y/N  8-22 Minutes   Review pain, meds, falls, vitals, symptoms yes    *Subjective Measures    yes NDI: 36% see media for details   Patient Education/HEP yes 8/2/23: Educated regarding purpose of myofascial release.  Pt verbalized understanding of education provided. 7/24/23: retested BPPV now neg, reviewed result, initiated VOR instructed and assigned HEP  7/13/23: Education regarding anatomy and pathophysiology of BPPV including assessment, treatment methods and post repositioning instructions.issued home epley   7/5/23: Educated regarding important lifestyle habits which contribute to concussion recovery including adequate sleep, cardiovascular  exercise, proper nutrition, hydration, and stress reduction. Issued home cervical/thoracic program.  Patient verbalized and demonstrated understanding of education provided.  6/29/23: Pt educated re: plan of care , posture , body mechanics, ergonomics, importance of rest/sleep, gentle exercise, hydration, stress reduction,  gradual incremental return to prior level of function.   Patient verbalized and demonstrated understanding of education provided.

## 2023-08-02 NOTE — OP OT TREATMENT LOG
"   OT Vision/mTBI  EXERCISES CURRENT SESSION TIME   NEURO RE-ED  CPT 06894 TOTAL TIME FOR SESSION 45 minutes    [employer]       VTS3/VTS4       CPT  Visual Search:   6 columns, 15 rows, added visual memory component  Trial 1- 1 error, 3min 24sec  Trial 2- 1 omission, 2min 57sec     Visual scan:   25 targets in array of 500 letters  Min incr'd time, denies incr'd ocular strain     BITs       NVR      Saccadic Fixation  digit scan   Min incr'd time, denies incr'd PCS symptoms     Ocular Motor Control      Visual Tracing      3D Tracking      Visual Perception      Convergence/Divergence Sustained convergence with cognitive component - \"Mountain Climbing\" deductive reasoning puzzle.  Min/mod incr'd time, 100% accuracy, denies incr'd PCS sypmtoms     Accommodation      Visual Stimulation      THER ACT  CPT 32150 TOTAL TIME FOR SESSION 15 minutes   Assessment       Pain, Vitals, Meds, Etc. Assessed; monitored, reviewed     HEP Reviewed, Reinforced HEP - verbs good understanding    Reports good compliance with HEP to date - gets some eye strain but does not provoke full HA. Encouraged to incorporate personal challenges with delayed recall - making lists, errands, etc - using compensatory strategies      CURRENT HEP =      - Ocular ROM and Gaze Fixations  - Saccades  - Visual Tracing     Visual Endurance        Work/School Simulation        SELF CARE  CPT 96317 TOTAL TIME FOR SESSION Not Performed   PCS Symptom Management/Education Educated re: results of objective assessment and how these relate to functional deficit areas. Reviewed goals with pt, with plan to discharge 8/9 as long as return to work is a successful transition. Focusing next session on return to work readiness.    Reinforced education re: OT role s/p mTBI to address functional visual system, importance of balancing provocation of symptoms w/ rest and recovery. Discussed nature of mTBI and impact on sympathetic nervous system regulation w/ emphasis on " energy conservation technique implementation. Patient verbalizes good understanding of all education provided.      ADL/IADLs       GROUP  CPT 65565 TOTAL TIME FOR SESSION

## 2023-08-03 NOTE — PROGRESS NOTES
Occupational Therapy Visit    OT DAILY NOTE FOR OUTPATIENT THERAPY    Patient: Chel Price   MRN: [id number]  : 1975 47 y.o.  Referring Physician: Colin Jama MD  Date of Visit: 2023      Certification Dates: 23 through 23    Diagnosis:   1. Functional vision problem    2. Concussion without loss of consciousness, sequela (CMS/HCC)    3. Cervicalgia        Chief Complaints:   PCS    Precautions:  Existing Precautions/Restrictions: no known precautions/restrictions    TODAY'S VISIT    Time In Session:      History/Vitals/Pain/Encounter Info - 23 0908        Injury History/Precautions/Daily Required Info    Document Type daily treatment     Primary Therapist Tiffany Hammonds     Chief Complaint/Reason for Visit  PCS     Onset of Illness/Injury or Date of Surgery 23     Referring Physician Dr. Jama     Existing Precautions/Restrictions no known precautions/restrictions     History of present illness/functional impairment Chel Price is a 46 y/o gentlelady with a history of anxiety, ADHD, who presents to concussion clinic for evaluation of an injury sustained on 23.   She stood up suddenly while at the playground with her child on 23 and hit the top of her head on playground equipment. There was no loss of consciousness. She developed headache and nausea the next day suddenly. She saw her PCP and was referred for a CT scan of the head which was unremarkable for acute intracranial pathology. She developed worsening symptoms and took time off of work. She returned to work and did notice worsening light sensitivity, screen intolerance, nausea, and feeling worse the following day. She is typically very active, walking a lot during the day and has been hesitant to return to her usual activity. She does have a history of migraine and sees Dr. Fox. She takes Emgality and Ubrelvy. She has headaches and neck pain now which is different than her usual headache. This left  "sided neck pain is described as a tightness and ache and her head feels \"fuzzy\" with tingling and zapping sensations on the left side of her head. She does have some nausea with this type of headache. She takes ibuprofen and has tried topical heat and is has helped. She does have flexeril at home but hasn't used it for neck pain or headache. She notes trouble falling asleep. Uses teas and natural remedies to help with sleep. Has tried melatonin without much effect.  History of anxiety on lexapro, ADHD on Vyvanse 20 mg. Sees NATALYA [facility] at [facility]. Feeling executive dysfunction worse over the past two weeks. Still gets her menstrual period, LMP was 5/18/23, utilizing different forms of birth control.   Driving: No issues.   No prior concussions.  No litigation.  Allergies include penicillins and sulfa Medical history includes asthma, migraines, and perioral dermatitis Her surgical history includes cholecystectomy  Her family history is significant for thyroid disease and hypertension in her mother, and depression in her sister. She is working as a  She does not drink alcohol or use tobacco.     Patient/Family/Caregiver Comments/Observations Arrives with c/o upset stomach and some neck pain, denies HA.     Patient reported fall since last visit No        Pain Assessment    Currently in pain Yes     Preferred Pain Scale number (Numeric Rating Pain Scale)     Pain Side/Orientation generalized     Pain: Body location Neck     Pain Rating (0-10): Pre Activity 4     Pain Rating (0-10): Post Activity 4        Pain Interventions    Intervention  monitored, repositioned prn     Post Intervention Comments no change         Services    Do You Speak a Language Other Than English at Home? no                Daily Treatment Assessment and Plan - 08/02/23 1200        Daily Treatment Assessment and Plan    Progress toward goals Progressing     Daily Outcome Summary Tolerated session well with " "focus on visual endurance and habituation to multi-stim environments.  Discussed compensatory strategies pt is using throughout work day - discussed using blue blocking glasses which pt has purchased but not used to date - pt will initiate use and assess benefits in next week.     Plan and Recommendations Continue OT POC 1x week for 6-8 weeks with focus on functional vision including endurance, work simulation, computer use and multi-stim habituation with goal of return to work full time.  D/C next visit - pt awrae and in [employer].                     OBJECTIVE DATA TAKEN TODAY    None Taken    Today's Treatment:       OT Vision/mTBI  EXERCISES CURRENT SESSION TIME   NEURO RE-ED  CPT 37345 TOTAL TIME FOR SESSION 45 minutes    D[employer]       VTS3/VTS4       CPT  Visual Search:   6 columns, 15 rows, added visual memory component  Trial 1- 1 error, 3min 24sec  Trial 2- 1 omission, 2min 57sec     Visual scan:   25 targets in array of 500 letters  Min incr'd time, denies incr'd ocular strain     BITs       NVR      Saccadic Fixation  digit scan   Min incr'd time, denies incr'd PCS symptoms     Ocular Motor Control      Visual Tracing      3D Tracking      Visual Perception      Convergence/Divergence Sustained convergence with cognitive component - \"Mountain Climbing\" deductive reasoning puzzle.  Min/mod incr'd time, 100% accuracy, denies incr'd PCS sypmtoms     Accommodation      Visual Stimulation      THER ACT  CPT 10776 TOTAL TIME FOR SESSION 15 minutes   Assessment       Pain, Vitals, Meds, Etc. Assessed; monitored, reviewed     HEP Reviewed, Reinforced HEP - verbs good understanding    Reports good compliance with HEP to date - gets some eye strain but does not provoke full HA. Encouraged to incorporate personal challenges with delayed recall - making lists, errands, etc - using compensatory strategies      CURRENT HEP =      - Ocular ROM and Gaze Fixations  - Saccades  - Visual Tracing     Visual Endurance      "   Work/School Simulation        SELF CARE  CPT 18848 TOTAL TIME FOR SESSION Not Performed   PCS Symptom Management/Education Educated re: results of objective assessment and how these relate to functional deficit areas. Reviewed goals with pt, with plan to discharge 8/9 as long as return to work is a successful transition. Focusing next session on return to work readiness.    Reinforced education re: OT role s/p mTBI to address functional visual system, importance of balancing provocation of symptoms w/ rest and recovery. Discussed nature of mTBI and impact on sympathetic nervous system regulation w/ emphasis on energy conservation technique implementation. Patient verbalizes good understanding of all education provided.      ADL/IADLs       GROUP  CPT 61495 TOTAL TIME FOR SESSION

## 2023-08-09 ENCOUNTER — HOSPITAL ENCOUNTER (OUTPATIENT)
Dept: PHYSICAL THERAPY | Facility: REHABILITATION | Age: 48
Setting detail: THERAPIES SERIES
Discharge: HOME | End: 2023-08-09
Attending: STUDENT IN AN ORGANIZED HEALTH CARE EDUCATION/TRAINING PROGRAM
Payer: COMMERCIAL

## 2023-08-09 ENCOUNTER — HOSPITAL ENCOUNTER (OUTPATIENT)
Dept: OCCUPATIONAL THERAPY | Facility: REHABILITATION | Age: 48
Setting detail: THERAPIES SERIES
Discharge: HOME | End: 2023-08-09
Attending: STUDENT IN AN ORGANIZED HEALTH CARE EDUCATION/TRAINING PROGRAM
Payer: COMMERCIAL

## 2023-08-09 DIAGNOSIS — S06.0X0S CONCUSSION WITHOUT LOSS OF CONSCIOUSNESS, SEQUELA (CMS/HCC): ICD-10-CM

## 2023-08-09 DIAGNOSIS — M54.2 CERVICALGIA: Primary | ICD-10-CM

## 2023-08-09 DIAGNOSIS — H54.7 FUNCTIONAL VISION PROBLEM: Primary | ICD-10-CM

## 2023-08-09 DIAGNOSIS — G44.309 POST-TRAUMATIC HEADACHE, NOT INTRACTABLE, UNSPECIFIED CHRONICITY PATTERN: ICD-10-CM

## 2023-08-09 PROCEDURE — 97112 NEUROMUSCULAR REEDUCATION: CPT | Mod: GP

## 2023-08-09 PROCEDURE — 97112 NEUROMUSCULAR REEDUCATION: CPT | Mod: GO

## 2023-08-09 PROCEDURE — 97530 THERAPEUTIC ACTIVITIES: CPT | Mod: GO

## 2023-08-09 PROCEDURE — 97530 THERAPEUTIC ACTIVITIES: CPT | Mod: GP

## 2023-08-09 NOTE — OP PT TREATMENT LOG
"VESTIBULAR PT FLOWSHEET    P.T. TREATMENT LOG   Precautions:   Eval Date: 6/29/23 Progress Note: q10v/30d   POC expires: 6/30/23 Insurance Limits: BOMN   Treatment Current Session Time   CANALITH  REPOSITIONING TREATMENT  (CPT 11230) Y/N Notes Not performed   CRT   See objective information/vestibular specialty flow sheet/media for details.    NEURO RE-ED  (CPT 82337) Y/N Notes 38-52 Minutes   BPPV ASSESSMENT   See objective information/vestibular specialty flow sheet/media for details.   VOR CANCELLATION            Pt with mod increase in dizziness with 10 cycles   Standing H/VVOR-C  VOR-C 5 cycles of horizontal , vertical , clockwise , counterclockwise with the following symptoms                     Horizontal:                    Vertical:                    Clockwise:                    Counterclockwise:    Ambulation w/ H/VVOR-C     VOR / GAZE STABILITY yes GST and DVA See objective information/vestibular specialty flow sheet/media for details.   Standing H/VVOR   H VOR @ 60 bpm x 60\" standing 6' from blue board 1\" B 3/10 dizzy  V VOR @ 60 bpm x 60\"  standing 6' from blue board 1\" B 3/10 dizzy   Ambulation w/H/VVOR     H/VVOR on compliant surfaces     Functional VOR     HABITUATION     Ball circles     Repetitive functional movements     BALANCE- STATIC yes SOT: See objective information/vestibular specialty flow sheet/media for details.   On floor     Airex foam     Rockerboard     BALANCE- DYNAMIC     Ambulation-head turns/nods     Ambulation - 180 & 360 degree turns     Retro ambulation EO     Ambulation with EC     Obstacles     Tandem     C/S KINESTHESIA/ COORDINATION     MULTITASKING     OKS     *Objective Measures  yes See objective information/vestibular specialty flow sheet/media for details.   THER-EX   (CPT 06713) Y/N SETS REPS time 0-7 Minutes   STRETCHING        Chin tucks (supine, seated)     2 10s    Upper Trapezius Stretch n   2 20s    Levator Scapula stretch n   2 20s    Scalene/SCM stretch n  2 " 20 s            Foam roll Pectoralis stretch   Supine shoulder to table        2   10s    Open Book/ thread the needle thoracic rotation stretches   2 20 s    Seated thoracic extension over back of chair with neck support        ROM        SNAGS for rotation        SNAGS for extension        Supine snow angels        Seated thoracic extension                STRENGTHENING        Cervical Stabilization        Cervical isometrics (c/s rot, flex, ext, SB, capital flex)        Resisted chin tucks w/ TB        Cervical retractions prone on elbows        Deep cervical flexor strengthening        Supine Chin tuck    5 5s    Scapular Stabilization        Supine scap retraction /depression is to mat   5 5s    Seated scap retraction/depression        No money (B/L UE ER w/ TB)        TB rows/overhead rows        TB shoulder extension         TB shoulder horiz ABD        Standing chin tuck with shoulder flexion/abduction        W to Y's (supine or n)          Supine foam roll W to Y    5  5s    Prone T, Y, I         Modified supermans                CARDIOVASCULAR     UBE     Recumbent bike     Treadmill/BCTT     MANUAL  (CPT 95274) Y/N  0-7 Minutes   STM/MFR/TPR/SOT n SO, UT, LS, SCM    IASTM     Joint Mobilizations n Gentle rotational for left rotation   Manual stretching  Contract relax for rotation n  n SO, UT,LS, SCM     E-STIM UNATTENDED  (CPT ) Y/N  Not performed   TENS/ H-wave      HOT / COLD PACKS  (CPT 71515) Y/N  0-7 Minutes   Heat n During TA and subjective   Ice     THER ACT  (CPT 52459) Y/N  8-22 Minutes   Review pain, meds, falls, vitals, symptoms yes    *Subjective Measures    n NDI: 36% see media for details   Patient Education/HEP yes 8/9/23: Educated patient regarding the results of today's GST, DVA and SOT testing and the associated functional implications.   Patient verbalized and demonstrated understanding of education provided  .8/2/23: Educated regarding purpose of myofascial release.  Pt verbalized  understanding of education provided.   7/24/23: retested BPPV now neg, reviewed result, initiated VOR instructed and assigned HEP  7/13/23: Education regarding anatomy and pathophysiology of BPPV including assessment, treatment methods and post repositioning instructions.issued home epley   7/5/23: Educated regarding important lifestyle habits which contribute to concussion recovery including adequate sleep, cardiovascular exercise, proper nutrition, hydration, and stress reduction. Issued home cervical/thoracic program.  Patient verbalized and demonstrated understanding of education provided.  6/29/23: Pt educated re: plan of care , posture , body mechanics, ergonomics, importance of rest/sleep, gentle exercise, hydration, stress reduction,  gradual incremental return to prior level of function.   Patient verbalized and demonstrated understanding of education provided.      Fall

## 2023-08-09 NOTE — PROGRESS NOTES
Occupational Therapy Discharge    OT DISCHARGE NOTE FOR OUTPATIENT THERAPY    Patient: Chel Price   MRN: [id number]  : 1975 47 y.o.  Referring Physician: Colin Jama MD  Date of Visit: 2023      Certification Dates:  23 through 23    Total Visit Count: 6    Diagnosis:   1. Functional vision problem    2. Concussion without loss of consciousness, sequela (CMS/[facility])        Chief Complaints:   Chief Complaint   Patient presents with   • Visual Deficits       Precautions:   Existing Precautions/Restrictions: no known precautions/restrictions    TODAY'S VISIT:    Time In Session:  Start Time: 1000  Stop Time: 1045  Time Calculation (min): 45 min   General Information - 23 1007        Session Details    Document Type discharge evaluation     Mode of Treatment individual therapy        General Information    Onset of Illness/Injury or Date of Surgery 23     Referring Physician Dr. Jama     History of present illness/functional impairment Chel Price is a 46 y/o gentlelady with a history of anxiety, ADHD, who presents to concussion clinic for evaluation of an injury sustained on 23.   She stood up suddenly while at the playground with her child on 23 and hit the top of her head on playground equipment. There was no loss of consciousness. She developed headache and nausea the next day suddenly. She saw her PCP and was referred for a CT scan of the head which was unremarkable for acute intracranial pathology. She developed worsening symptoms and took time off of work. She returned to work and did notice worsening light sensitivity, screen intolerance, nausea, and feeling worse the following day. She is typically very active, walking a lot during the day and has been hesitant to return to her usual activity. She does have a history of migraine and sees Dr. Fox. She takes Emgality and Ubrelvy. She has headaches and neck pain now which is different than her usual  "headache. This left sided neck pain is described as a tightness and ache and her head feels \"fuzzy\" with tingling and zapping sensations on the left side of her head. She does have some nausea with this type of headache. She takes ibuprofen and has tried topical heat and is has helped. She does have flexeril at home but hasn't used it for neck pain or headache. She notes trouble falling asleep. Uses teas and natural remedies to help with sleep. Has tried melatonin without much effect.  History of anxiety on lexapro, ADHD on Vyvanse 20 mg. Sees NATALYA [facility] at [facility]. Feeling executive dysfunction worse over the past two weeks. Still gets her menstrual period, LMP was 5/18/23, utilizing different forms of birth control.   Driving: No issues.   No prior concussions.  No litigation.  Allergies include penicillins and sulfa Medical history includes asthma, migraines, and perioral dermatitis Her surgical history includes cholecystectomy  Her family history is significant for thyroid disease and hypertension in her mother, and depression in her sister. She is working as a  She does not drink alcohol or use tobacco.     Patient/Family/Caregiver Comments/Observations Pt arrives with 2/10 neck pain, agreeable to discharge today. Leaving for vacation next week.     Existing Precautions/Restrictions no known precautions/restrictions         Services    Do You Speak a Language Other Than English at Home? no        Behavioral Health Related Communication    Suicidal Ideation No                Daily Falls Screen - 08/09/23 1017        Daily Falls Assessment    Patient reported fall since last visit No                Pain/Vitals - 08/09/23 1017        Pain Assessment    Currently in pain Yes     Pain: Body location Neck     Pain Rating (0-10): Pre Activity 2        Pain Interventions    Intervention  monitored; rest breaks provided     Post Intervention Comments no change                OT " - 08/09/23 1017        Occupational Therapy Encounter Type Details    Occupational Therapy Specialty MTBI OT        OT Frequency and Duration    Frequency of treatment 1 time/week     OT Duration 8 weeks     OT Cert From 06/21/23     OT Cert To 08/20/23     Date OT POC was sent to provider 06/21/23     Signed OT Plan of Care received?  Yes                Assessment - 08/09/23 1017        Assessment    Plan of Care reviewed and patient/family in agreement Yes     System Pathology/Pathophysiology Noted neuromuscular     Functional Limitations in Following Categories work;home management;community/leisure     Problem List: Occupational Therapy functional vision     Actions taken Physcial therapy eval for vestibular evaluation on 7/5     Rehab Potential/Prognosis: Occupational Therapy good, to achieve stated therapy goals     Clinical Assessment Chel Price is a 46 y/o female who presents to OP OT following concussion on 5/28 for discharge evaluation to address ongoing PCS. Since IE, pt has been seen for 6 visits. 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 Pt reports she is now able to tolerate 90 minutes ( 30 minutes) of computer use as this time and is experiencing inc in fatigue impacting her ability to complete IADL tasks and particiapte in leisure/exercise. Pt scores 12/64 (from 29/64) on [facility] PCS Questionnaire with reports of mild fatigue, moderate headaches, nausea, cognitive changes. Objectively, pt reports ongoing slowed processing speed and neck pain. improved saccadic fixation as evidenced by a Gerber Devick Score of 38 (from 60s) (norm=52), horizontal saccades screening avg 5 sec (from 7.2 s) and vertical saccades 4s (from 5.4 s) (norm =4.5-5.5 s). Pt is WNL for covergence and accommodation however notes strain, ARTEAGA provoked following assessment. OT recommending discharge from occupational therapy services as this time due to completion of goals, retrun to functioal IADL and ADL  independence with absent or manageable symptoms.     Plan and Recommendations OT recommending discharge from occupational therapy services as this time due to completion of goals, retrun to functioal IADL and ADL independence with absent or manageable symptoms..                 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 Yes     Right eye assessed? Yes     Superior assessed? Yes     Inferior assessed? Yes     Diagonal assessed? Yes     Circular assessed? Yes     Left AROM without target ROM Intact     Left oculomotor AROM Discomfort None     Left gaze fixation (10 second hold) Able      Right AROM without target ROM Intact      Right oculomotor AROM Discomfort None     Right gaze fixation (10 second hold) Able     Superior AROM without target ROM Intact      Superior oculomotor AROM Discomfort None     Superior gaze fixation (10 second hold) Able     Inferior AROM without target ROM Intact     Inferior oculomotor AROM Discomfort None     Inferior gaze fixation (10 second hold) Able     Diagonal AROM without target ROM Intact     Diagonal  oculomotor AROM Discomfort None     Diagonal gaze fixation (10 second hold) Able     Circular AROM without target ROM Intact     Circular oculomotor AROM Discomfort None        Eye Teaming    Convergence Intact   3 inches    Divergence Intact     Accommodation Intact     Smooth Pursuits Intact     3D Tracking Intact     Nystagmus No        Saccadic Fixation Speed    Vertical Saccadic Fixation WFL     Horizontal Saccadic Fixation WFL     Horizontal Saccades H1 5.05 Seconds     Horizontal Saccades H2 5.2 Seconds     Horizontal Saccades H3 5.3 Seconds     Horizontal Saccades H4 5.4 Seconds     Horizontal Saccades trials average 5.24 Seconds     Vertical Saccades V1 4.05 Seconds     Vertical Saccades V2 4.08 Seconds     Vertical Saccades V3 4.5 Seconds     Vertical Saccades V4 3.91 Seconds     Vertical Saccades trials average 4.14 Seconds     Gerber Devick Test #1 (seconds) 13.1 Seconds     Gerber Devick Test #2 (seconds) 12.4 Seconds     Gerber Devick Test #3 (seconds) 12.73 Seconds     Gerber Devick Total Time 38.23 Seconds     Gerber Devick Errors #1 0     Gerber Devick Errors #2 0     Gerber Devick Errors #3 0     Gerber Devick Total Errors 0        Visual Reaction Timing    Dynavision Score (Mode B, 5 sec light timer) Targets 86     Dynavision Score Avg response time 0.7 Seconds     Dynavision Score Overall WFL        Symptoms and Outcome Measures    Symptoms Slowed processing;Cognitive changes     [facility] 12/64     Symptoms/Comments no longer experiencing fatigue, HA on a daily basis.                   Outcome Measures        6/21/2023    08:07 6/30/2023    12:13 7/25/2023    16:19 8/9/2023    10:17   OT OBJECTIVE Outcome Measures   Gerber Devick Total Time 59.6 Seconds  39.86 Seconds 38.23 Seconds   Gerber Devick Total Errors 0  0 0   Dynavision - Targets  86  86   Dynavision - Seconds  0.7 Seconds  0.7 Seconds   Dynavision - Impression  WFL  WFL   OT SUBJECTIVE Outcome Measures   [facility] 29/64 12/64 12/64       Today's  Treatment:     Education provided:  Yes: See treatment log for details of education provided       OT Vision/mTBI  EXERCISES CURRENT SESSION TIME   NEURO RE-ED  CPT 00351 TOTAL TIME FOR SESSION 35 minutes    Dynavision  assessed; see flowsheet     VTS3/VTS4       CPT      BITs      NVR      Saccadic Fixation assessed; see flowsheet     Ocular Motor Control assessed; see flowsheet     Visual Tracing assessed; see flowsheet     3D Tracking assessed; see flowsheet     Visual Perception assessed; see flowsheet     Convergence/Divergence assessed; see flowsheet     Accommodation assessed; see flowsheet     Visual Stimulation      THER ACT  CPT 99426 TOTAL TIME FOR SESSION 10 minutes   Assessment       Pain, Vitals, Meds, Etc. Assessed; monitored, reviewed    Educated pt on discharge plan with continuation of PT services as well as OT HEP. Pt is choosing to stay at 20 hrs/week at her job at this time.     HEP Reviewed, Reinforced HEP - verbs good understanding for continuation of HEP following d/c    Reports good compliance with HEP to date - gets some eye strain but does not provoke full HA. Encouraged to incorporate personal challenges with delayed recall - making lists, errands, etc - using compensatory strategies      CURRENT HEP =      - Ocular ROM and Gaze Fixations  - Saccades  - Visual Tracing     Visual Endurance        Work/School Simulation        SELF CARE  CPT 82824 TOTAL TIME FOR SESSION Not Performed   PCS Symptom Management/Education Educated re: results of objective assessment and how these relate to functional deficit areas. Reviewed goals with pt, with plan to discharge 8/9 as long as return to work is a successful transition. Focusing next session on return to work readiness.    Reinforced education re: OT role s/p mTBI to address functional visual system, importance of balancing provocation of symptoms w/ rest and recovery. Discussed nature of mTBI and impact on sympathetic nervous system regulation  w/ emphasis on energy conservation technique implementation. Patient verbalizes good understanding of all education provided.      ADL/IADLs       GROUP  CPT 47640 TOTAL TIME FOR SESSION                          Goals Addressed                 This Visit's Progress    • COMPLETED: OT- PCS        MTBI GOALS  Short Term Goals Time Frame Result Comment   Full ocular motor range of motion and control with mild symptoms 4 weeks Met    Convergence less than or equal to 6 inches for near-focus tasks of reading and computer use with mild symptoms 4 weeks Met    Demonstrate fair tolerance for normal volume and intensity of visual stimulation as demonstrated by only mild symptoms after 20 minutes engagement in activity in moderate-high mult-stim environment 4 weeks Met    Visual reaction time within or less than 0.75 seconds via Dynavision with mild symptoms 4 weeks Met    Saccadic fixation speed of less than 52 seconds as measured by the Gerber Devick Test with mild symptoms 4 weeks Met    Visual perceptual skills via MVPT-4 with a standard score equivalent to age range 6 weeks N/A    Patient will perform IADLs and community activities with mild symptoms as evidenced by a 10 point reduction on the [facility] Post Concussion Symptoms Questionnaire 4 weeks Met 29 to 12   Patient will be independent with visual home exercise program 4 weeks Met        Long Term Goals Time Frame Result Comment   Demonstrate good tolerance for normal volume and intensity of visual stimulation as evidenced by report of absent to manageable symptoms after 60 minutes engagement in activity in moderate-high multi-stim environment 8 weeks Met    Patient will complete necessary reading for work/leisure/school, with accommodations if indicated, as evidenced by reported ability to read for 60 minutes, with absent or manageable symptoms 8 weeks Met    Patient will utilize computer for work/leisure/school tasks, with accommodations if indicated, as evidenced  by reported ability to use computer for 1 hour, with absent or manageable symptoms 8 weeks Met    Patient will return to work/school with full or modified duties with absent or manageable symptoms 8 weeks Met Staying with 20 hrs/per week as new schedule; full duties   Patient will perform IADLs and community activities with manageable symptoms as measured by a 20 point reduction on the [facility] Post Concussion Symptoms Questionnaire. 8 weeks Met                Discharge information for CARF:    Reason for D/C: Goals met  Was care interrupted for medical reason?: No  Care was interrupted due to hospitalization?: No  Did the patient demonstrate increased independence: Yes  Demonstration of independece:: [place] in HEP, Increased functional activity, Increased functional independence  Has the patient returned to productive activity?: Yes  Increased production assessment:: Return to household activities, Return to driving, Increased community independence, Return to work/school/volunteer activities, Return to participation in hobbies/leisure activities  Skin integrity: Intact

## 2023-08-09 NOTE — OP OT TREATMENT LOG
OT Vision/mTBI  EXERCISES CURRENT SESSION TIME   NEURO RE-ED  CPT 13858 TOTAL TIME FOR SESSION 35 minutes    Dynavision  assessed; see flowsheet     VTS3/VTS4       CPT      BITs      NVR      Saccadic Fixation assessed; see flowsheet     Ocular Motor Control assessed; see flowsheet     Visual Tracing assessed; see flowsheet     3D Tracking assessed; see flowsheet     Visual Perception assessed; see flowsheet     Convergence/Divergence assessed; see flowsheet     Accommodation assessed; see flowsheet     Visual Stimulation      THER ACT  CPT 45737 TOTAL TIME FOR SESSION 10 minutes   Assessment       Pain, Vitals, Meds, Etc. Assessed; monitored, reviewed    Educated pt on discharge plan with continuation of PT services as well as OT HEP. Pt is choosing to stay at 20 hrs/week at her job at this time.     HEP Reviewed, Reinforced HEP - verbs good understanding for continuation of HEP following d/c    Reports good compliance with HEP to date - gets some eye strain but does not provoke full HA. Encouraged to incorporate personal challenges with delayed recall - making lists, errands, etc - using compensatory strategies      CURRENT HEP =      - Ocular ROM and Gaze Fixations  - Saccades  - Visual Tracing     Visual Endurance        Work/School Simulation        SELF CARE  CPT 67361 TOTAL TIME FOR SESSION Not Performed   PCS Symptom Management/Education Educated re: results of objective assessment and how these relate to functional deficit areas. Reviewed goals with pt, with plan to discharge 8/9 as long as return to work is a successful transition. Focusing next session on return to work readiness.    Reinforced education re: OT role s/p mTBI to address functional visual system, importance of balancing provocation of symptoms w/ rest and recovery. Discussed nature of mTBI and impact on sympathetic nervous system regulation w/ emphasis on energy conservation technique implementation. Patient verbalizes good  understanding of all education provided.      ADL/IADLs       GROUP  CPT 65638 TOTAL TIME FOR SESSION

## 2023-08-09 NOTE — PROGRESS NOTES
Physical Therapy Visit    PT DAILY NOTE FOR OUTPATIENT THERAPY    Patient: Chel Price MRN: [id number]  : 1975 47 y.o.  Referring Physician: Colin Jama MD  Date of Visit: 2023    Certification Dates: 23 through 23    Diagnosis:   1. Cervicalgia    2. Concussion without loss of consciousness, sequela (CMS/HCC)    3. Post-traumatic headache, not intractable, unspecified chronicity pattern        Chief Complaints:  Headache, Neck Pain, Whooziness    Precautions:          TODAY'S VISIT    Time In Session:      History/Vitals/Pain/Encounter Info - 23 1105        Injury History/Precautions/Daily Required Info    Document Type daily treatment     Primary Therapist Will     Chief Complaint/Reason for Visit  Headache, Neck Pain, Whooziness     Onset of Illness/Injury or Date of Surgery 23     Referring Physician Dr. Jama     History of present illness/functional impairment Suddenly when standing up, hit top of head on metal playground bar. Date of Occurence date of occurence: 2023. Loss of Consciousness none. Associated Hospitalizations none. Imaging CT scan; Done 23. Associated Injuries none. Post Traumatic Amnesia absent. Therapy History ; None. Associated Symptoms headaches, sensitivity to sensory stimulation; Nausea, neck pain. Work History ; Working 6 hr/day remotely x 8 years. Took two weeks off of work. Went back to work 23, and has been trying to avoid screen time. Started work at 9 am, by 11 am felt nauseated, had been taking breaks. Got through full day of work, felt worse at the end of the day     Patient/Family/Caregiver Comments/Observations Pt reports today neck is a 2 and she has a standing desk that she is working on to try to get the best set up.     Patient reported fall since last visit No        Pain Assessment    Currently in pain Yes     Preferred Pain Scale number (Numeric Rating Pain Scale)     Pain Rating (0-10): Pre Activity 2        Pain  Intervention    Intervention  monitored     Post Intervention Comments no change reported                Daily Treatment Assessment and Plan - 08/09/23 1343        Daily Treatment Assessment and Plan    Progress toward goals Progressing     Daily Outcome Summary Pt's neck was feeling better today but she did report having 8/10 neck pain yesterday that seems to be exacerbated by working on the computer. Pt tolerated SOT , GST and DVA well with good results showing good recovery of vestibular fuction for equilibrium, balance and gaze stability. Pt shows very mild left sided vestibular hypofunction but without symptoms of dizziness.     Plan and Recommendations Continue flexibility and strength training for cervical spine and scapular muscles. Pec minor stretching  FGA, MSQ,  , HSSOT,    , habituation training, cardiovascular training.                     OBJECTIVE DATA TAKEN TODAY:    Vestibular     PT Vestibular Evaluation - 08/09/23 1100        Balance Master    SOT Composite Norms WNL     SOT Composite Score 82     Somatosensory WNL     Visual WNL     Vestibular WNL     Visual Preference WNL        Dynamic Vision Testing    Perception Time Test WNL (<60 msec)     Gaze Stabilization Test WNL     Dynamic Visual Acuity WNL (</equal to 2.0 line difference)     Comments 148 left, 165 right , 5% assym,  1.8 lines left, 1.2 lines right , 0.8 lines down, 2 lines up , Very mild left sided weakness but WFL with no symptoms                 Today's Treatment:    VESTIBULAR PT FLOWSHEET    P.T. TREATMENT LOG   Precautions:   Eval Date: 6/29/23 Progress Note: q10v/30d   POC expires: 6/30/23 Insurance Limits: BOMN   Treatment Current Session Time   CANALITH  REPOSITIONING TREATMENT  (CPT 61550) Y/N Notes Not performed   CRT   See objective information/vestibular specialty flow sheet/media for details.    NEURO RE-ED  (CPT 78332) Y/N Notes 38-52 Minutes   BPPV ASSESSMENT   See objective information/vestibular specialty flow  "sheet/media for details.   VOR CANCELLATION            Pt with mod increase in dizziness with 10 cycles   Standing H/VVOR-C  VOR-C 5 cycles of horizontal , vertical , clockwise , counterclockwise with the following symptoms                     Horizontal:                    Vertical:                    Clockwise:                    Counterclockwise:    Ambulation w/ H/VVOR-C     VOR / GAZE STABILITY yes GST and DVA See objective information/vestibular specialty flow sheet/media for details.   Standing H/VVOR   H VOR @ 60 bpm x 60\" standing 6' from blue board 1\" B 3/10 dizzy  V VOR @ 60 bpm x 60\"  standing 6' from blue board 1\" B 3/10 dizzy   Ambulation w/H/VVOR     H/VVOR on compliant surfaces     Functional VOR     HABITUATION     Ball circles     Repetitive functional movements     BALANCE- STATIC yes SOT: See objective information/vestibular specialty flow sheet/media for details.   On floor     Airex foam     Rockerboard     BALANCE- DYNAMIC     Ambulation-head turns/nods     Ambulation - 180 & 360 degree turns     Retro ambulation EO     Ambulation with EC     Obstacles     Tandem     C/S KINESTHESIA/ COORDINATION     MULTITASKING     OKS     *Objective Measures  yes See objective information/vestibular specialty flow sheet/media for details.   THER-EX   (CPT 75513) Y/N SETS REPS time 0-7 Minutes   STRETCHING        Chin tucks (supine, seated)     2 10s    Upper Trapezius Stretch n   2 20s    Levator Scapula stretch n   2 20s    Scalene/SCM stretch n  2 20 s            Foam roll Pectoralis stretch   Supine shoulder to table        2   10s    Open Book/ thread the needle thoracic rotation stretches   2 20 s    Seated thoracic extension over back of chair with neck support        ROM        SNAGS for rotation        SNAGS for extension        Supine snow angels        Seated thoracic extension                STRENGTHENING        Cervical Stabilization        Cervical isometrics (c/s rot, flex, ext, SB, capital " flex)        Resisted chin tucks w/ TB        Cervical retractions prone on elbows        Deep cervical flexor strengthening        Supine Chin tuck    5 5s    Scapular Stabilization        Supine scap retraction /depression is to mat   5 5s    Seated scap retraction/depression        No money (B/L UE ER w/ TB)        TB rows/overhead rows        TB shoulder extension         TB shoulder horiz ABD        Standing chin tuck with shoulder flexion/abduction        W to Y's (supine or n)          Supine foam roll W to Y    5  5s    Prone T, Y, I         Modified supermans                CARDIOVASCULAR     UBE     Recumbent bike     Treadmill/BCTT     MANUAL  (CPT 39374) Y/N  0-7 Minutes   STM/MFR/TPR/SOT n SO, UT, LS, SCM    IASTM     Joint Mobilizations n Gentle rotational for left rotation   Manual stretching  Contract relax for rotation n  n SO, UT,LS, SCM     E-STIM UNATTENDED  (CPT ) Y/N  Not performed   TENS/ H-wave      HOT / COLD PACKS  (CPT 74399) Y/N  0-7 Minutes   Heat n During TA and subjective   Ice     THER ACT  (CPT 92687) Y/N  8-22 Minutes   Review pain, meds, falls, vitals, symptoms yes    *Subjective Measures    n NDI: 36% see media for details   Patient Education/HEP yes 8/9/23: Educated patient regarding the results of today's GST, DVA and SOT testing and the associated functional implications.   Patient verbalized and demonstrated understanding of education provided  .8/2/23: Educated regarding purpose of myofascial release.  Pt verbalized understanding of education provided.   7/24/23: retested BPPV now neg, reviewed result, initiated VOR instructed and assigned HEP  7/13/23: Education regarding anatomy and pathophysiology of BPPV including assessment, treatment methods and post repositioning instructions.issued home epley   7/5/23: Educated regarding important lifestyle habits which contribute to concussion recovery including adequate sleep, cardiovascular exercise, proper nutrition,  hydration, and stress reduction. Issued home cervical/thoracic program.  Patient verbalized and demonstrated understanding of education provided.  6/29/23: Pt educated re: plan of care , posture , body mechanics, ergonomics, importance of rest/sleep, gentle exercise, hydration, stress reduction,  gradual incremental return to prior level of function.   Patient verbalized and demonstrated understanding of education provided.

## 2023-09-05 ENCOUNTER — DOCUMENTATION (OUTPATIENT)
Dept: PHYSICAL THERAPY | Facility: REHABILITATION | Age: 48
End: 2023-09-05
Payer: COMMERCIAL

## 2023-09-05 DIAGNOSIS — S06.0X0S CONCUSSION WITHOUT LOSS OF CONSCIOUSNESS, SEQUELA (CMS/HCC): Primary | ICD-10-CM

## 2023-09-05 DIAGNOSIS — G44.309 POST-TRAUMATIC HEADACHE, NOT INTRACTABLE, UNSPECIFIED CHRONICITY PATTERN: ICD-10-CM

## 2023-09-05 DIAGNOSIS — M54.2 CERVICALGIA: ICD-10-CM

## 2023-09-05 NOTE — PROGRESS NOTES
Physical Therapy Discharge      PT DISCHARGE NOTE FOR OUTPATIENT THERAPY    Patient: Chel Price MRN: [id number]  : 1975 47 y.o.  Referring Physician: No ref. provider found  Date of Visit: 2023      Certification Dates: 23 through 23    Total Visit Count: 7    Chief Complaints:  Chief Complaint   Patient presents with    PT Discharge     Patient did not return for scheduled visits, therefore PT has been discontinued with goals not formally reassessed.         Precautions:         TODAY'S VISIT:    Time In Session:            PT - 23 1501        Physical Therapy    Physical Therapy Specialty [facility] PT        PT Plan    Frequency of treatment 2 times/week     PT Duration 3 months     PT Cert From 23     PT Cert To 23     Signed PT Plan of Care received?  Yes                   OBJECTIVE MEASUREMENTS/DATA:    Vestibular Outcome Measures        7/10/2023    11:00 2023    08:00 2023    10:00 2023    11:00   Vestibular   Corrective lenses Progressives      Eye ROM WNL      Convergence Vision WNL      Divergent Vision WNL      Pupil Alignment WNL      Cover/Uncover WNL      Cover/Cross Over WNL      Smooth Pursuit/Small Target WNL      Saccades WNL      Vestibular Ocular Reflex (VOR) Abnormal      Comments tiny bit dizzy      Gaze-Evoked Nystagmus WNL      VOR Cancellation Abnormal      Right Rachael Hallpike  WNL  WNL;Modified with tilt table    Left Nordland Hallpike  Abnormal Abnormal WNL;Modified with tilt table    Left Rachael Hallpike Comments upbeating left torsion with mild symptoms trace left torsion /upbeat     Sit to Supine WNL      Horizontal Canal/Roll Test WNL      Horizontal Roll Test to Right  WNL      Horizontal Roll Test to Left  WNL      Interpretation of Results L PC canalithiasis mild L PC canalithiasis     BPPV Treatment Modified Left Epley . modified L epley     Post CRT Instructions  Yes       issued Home Epleys     SOT Composite Norms    WNL   SOT  Composite Score    82   Somatosensory    WNL   Visual    WNL   Vestibular    WNL   Visual Preference    WNL   Perception Time Test    WNL (<60 msec)   Gaze Stabilization Test    WNL   Dynamic Visual Acuity    WNL (</equal to 2.0 line difference)   Comments    148 left, 165 right , 5% assym,  1.8 lines left, 1.2 lines right , 0.8 lines down, 2 lines up , Very mild left sided weakness but WFL with no symptoms   Rhomberg Eyes Open   60 seconds    Rhomberg Eyes Closed   60 seconds    Sharpened Rhomberg Eyes Open   60 seconds    Sharpened Rhomberg Eyes Closed   30 seconds       tested @ 30 sec, 30/30        ROM and MMT        6/29/2023    09:00   PT Cervical/Lumbar/Other ROM Measurements   AROM: Cervical Flexion 48   AROM: Cervical Extension 53   AROM: Left Cervical SB 25   AROM: Right Cervical SB 30   AROM: Left Cervical Rotation 40   AROM: Right Cervical Rotation 55     Outcome Measures        6/29/2023    09:00   PT SUBJECTIVE Outcome Measures   NDI 36       Today's Treatment:    Education provided:  Yes: See treatment log for details of education provided  Methods: Discussion, Handout and Demonstration  Readiness: acceptance  Response: Demonstrated understanding and Verbalizes understanding         Goals Addressed                 This Visit's Progress     COMPLETED: PT cervical and vestiibular goals        GOALS NOT FORMALLY REASSESSED DUE TO DISCONTINUATION . PT DID NOT RETURN FOR SCHEDULED SESSIONS TO COMPLETE PLAN OF CARE.    Short Term Goals Time Frame Result Comment/  Progress   Pt will improve score on the NDI to at least 5 points lower than baseline assessment inc ability to perform ADLs and functional tasks with dec limitations from c-spine. 6wk TBA    Pt will report no greater than 3/10 pain in neck at rest inc tolerance for ADLs.  6wk progressing    Pt will inc c-spine AROM to  45 deg flexion, 50 deg extension, 40 deg SB, 65 deg Rot. 6wk TBA    Pt will report dec HA to no greater than 4 within the past  week to improve tolerance for ADLs. 6wk Met    Pt will be independent with established HEP 6wk progressing    Pt will report c-spine pain no greater than 2-3/10 on average to improve ability to perform ADLs and functional mobility.    6wk progressing       Long Term Goals Time Frame Result Comment/Progress   Pt will dec score on the NDI to <15 indicating no greater than a mild disability and improved ability to perform ADLs and functional tasks with decreased limitations from neck. 12wk     Pt will report no c-spine pain at rest and no greater than 1/10 pain with activity to inc tolerance for ADLs.   12wk     Pt will inc c-spine AROM to WNL 50 deg flexion, 60 deg extension, 45 deg SB, 75 deg rotaion to improve ability to improve ADLs.  12wk     Pt will report HA no greater than 2 over the past week to improve tolerance for ADLs. 12wk     Pt will be independent with postural correction for pain management and inc tolerance for activities in sitting and standing.      Pt will be independent with established HEP      Resume ADLs to 90% capacity without increase in neck pain. 12wk                     Goals Short-Long Time Frame       Result Comment   SHORT TERM GOALS       1. Romberg, Sharpened Romberg and Single Limb Stance TBA with eyes opened and closed for postural stability Short Term 4-6  weeks  Met    2. Patient's motion sensitivity quotient (MSQ) TBA to determine functional movement tolerance. Short Term 4-6 weeks TBA    3. Patient's Balance Master SOT score TBA to objectively assess equilibrium and postural control (score < 38 increases likelihood ratio of falls; MDC 8 points) Short Term 4-6 weeks TBA    4. FGA TBA to measure gait stability and fall risk. Short Term 4-6 weeks TBA    5. DVA TBA to objectively determine functional visual acuity with head movement. Short Term 4-6 weeks TBA    6. GST (Gaze Stability Test) TBA to determine functional level gaze stability. Short Term 4-6 weeks TBA    7. Patient will  perform home exercise program with supervision and cues. Short Term 4-6 weeks progressing    8. Patient will tolerate 10 minutes of cardiovascular conditioning exercise with vital signs stable  Short Term 4-6 weeks TBA    9. Patient will have negative BPPV all canals for symptom-free functional mobility. Short Term 4-6 weeks Met    LONG TERM GOALS:  NOT FULLY ASSESSED DUE TO SELF DISCONTINUATION.       1. Patient will improve Static Romberg, Sharpened Romberg and SLS to WNL for age with eyes opened and eyes closed for improved postural stability  Long Term 8-12 weeks      1. Patient will decrease motion sensitivity quotient to <2 for increased tolerance to functional movement. (2% or less =WNL)   Long Term 8-12 weeks     2. Patient will increase Balance Manager SOT score to WNL for improved equilibrium and postural control (score < 38 increases likelihood ratio of falls; MDC 8 points) Long Term 8-12 weeks     3. Patient will improve score on HSSSOT to WNL ( >70% Ratio head turn/No head turn equilibrium with eyes closed) to demonstrate improved equilibrium with head movement. Long Term 8-12  weels     4. Patient will increase FGA score to >/= 24/30     for maximal gait stability, safety and functional mobility. (fall risk </=22/30; MDC 6 points for vestibular diagnoses) Long Term 8-12 weeks     5. Patient will improve DVA to </=   2    line difference for functional level visual acuity with head movement.  (Norm= 2 lines or less) Long Term 8-12 weeks     6. Patient will achieve   WNL   on GST (Gaze Stability Test) for functional/high level gaze stability. Long Term 8-12 weeks     7. Pt will be independent with HEP Long Term 8-12 weeks     8. Patient will tolerate 15 minutes of cardiovascular conditioning exercise with vital signs stable and no abnormal symptoms. Long Term 8-12 weeks     9. Patient will have negative BPPV all canals for symptom-free functional mobility. Long Term 8-12 weeks     10. Patient will return  to household, community, and recreational activities of daily living.  Long Term 8-12 weeks     11. Patient will improve outcome measure   ABC > 80%, DHI  <10%    reflecting decreased symptoms and improved participation in functional activity.  ABC:(>/= 80% indicative of increased function and decreased fall risk)  DHI: (18 points= MCID) Long Term 8-12 weeks     12.Patient will demonstrate independence with managing any residual symptoms. Long Term 8-12  weeks     13. Patient will have no abnormal symptoms with challenging VOR activities to allow symptom free high level functional activity. Long Term 8-12 weeks                   Discharge information for [facility]:    Reason for D/C: Self discharge, Patient has not returned  Was care interrupted for medical reason?: No  Did the patient demonstrate increased independence: Yes  Demonstration of independece:: Graves in HEP, Increased functional activity, Increased functional independence  Has the patient returned to productive activity?: Yes  Increased production assessment:: Increased community independence, Return to household activities, Return to driving, Return to work/school/volunteer activities  Skin integrity: Intact

## 2024-09-19 ENCOUNTER — APPOINTMENT (EMERGENCY)
Dept: RADIOLOGY | Facility: HOSPITAL | Age: 49
End: 2024-09-19
Payer: COMMERCIAL

## 2024-09-19 ENCOUNTER — HOSPITAL ENCOUNTER (EMERGENCY)
Facility: HOSPITAL | Age: 49
Discharge: HOME | End: 2024-09-19
Attending: EMERGENCY MEDICINE
Payer: COMMERCIAL

## 2024-09-19 ENCOUNTER — APPOINTMENT (EMERGENCY)
Dept: RADIOLOGY | Facility: HOSPITAL | Age: 49
End: 2024-09-19
Attending: EMERGENCY MEDICINE
Payer: COMMERCIAL

## 2024-09-19 VITALS
OXYGEN SATURATION: 100 % | WEIGHT: 136 LBS | HEART RATE: 72 BPM | HEIGHT: 67 IN | DIASTOLIC BLOOD PRESSURE: 61 MMHG | BODY MASS INDEX: 21.35 KG/M2 | TEMPERATURE: 97.3 F | RESPIRATION RATE: 31 BRPM | SYSTOLIC BLOOD PRESSURE: 104 MMHG

## 2024-09-19 DIAGNOSIS — R42 LIGHTHEADEDNESS: ICD-10-CM

## 2024-09-19 DIAGNOSIS — R55 SYNCOPE AND COLLAPSE: Primary | ICD-10-CM

## 2024-09-19 LAB
ALBUMIN SERPL-MCNC: 3.8 G/DL (ref 3.5–5.7)
ALP SERPL-CCNC: 44 IU/L (ref 34–125)
ALT SERPL-CCNC: 27 IU/L (ref 7–52)
ANION GAP SERPL CALC-SCNC: 6 MEQ/L (ref 3–15)
APAP SERPL-MCNC: 0.4 UG/ML (ref 10–30)
AST SERPL-CCNC: 21 IU/L (ref 13–39)
BASOPHILS # BLD: 0.04 K/UL (ref 0.01–0.1)
BASOPHILS NFR BLD: 0.8 %
BILIRUB SERPL-MCNC: 0.9 MG/DL (ref 0.3–1.2)
BILIRUB UR QL STRIP.AUTO: NEGATIVE MG/DL
BUN SERPL-MCNC: 12 MG/DL (ref 7–25)
CALCIUM SERPL-MCNC: 9.1 MG/DL (ref 8.6–10.3)
CHLORIDE SERPL-SCNC: 103 MEQ/L (ref 98–107)
CLARITY UR REFRACT.AUTO: CLEAR
CO2 SERPL-SCNC: 26 MEQ/L (ref 21–31)
COLOR UR AUTO: YELLOW
CREAT SERPL-MCNC: 0.9 MG/DL (ref 0.6–1.2)
DIFFERENTIAL METHOD BLD: ABNORMAL
EGFRCR SERPLBLD CKD-EPI 2021: >60 ML/MIN/1.73M*2
EOSINOPHIL # BLD: 0.05 K/UL (ref 0.04–0.36)
EOSINOPHIL NFR BLD: 1 %
ERYTHROCYTE [DISTWIDTH] IN BLOOD BY AUTOMATED COUNT: 12.7 % (ref 11.7–14.4)
ETHANOL SERPL-MCNC: <10 MG/DL
GLUCOSE SERPL-MCNC: 91 MG/DL (ref 70–99)
GLUCOSE UR STRIP.AUTO-MCNC: NEGATIVE MG/DL
HCG UR QL: NEGATIVE
HCT VFR BLD AUTO: 40 % (ref 35–45)
HGB BLD-MCNC: 13.8 G/DL (ref 11.8–15.7)
HGB UR QL STRIP.AUTO: NEGATIVE
IMM GRANULOCYTES # BLD AUTO: 0.01 K/UL (ref 0–0.08)
IMM GRANULOCYTES NFR BLD AUTO: 0.2 %
KETONES UR STRIP.AUTO-MCNC: NEGATIVE MG/DL
LEUKOCYTE ESTERASE UR QL STRIP.AUTO: NEGATIVE
LYMPHOCYTES # BLD: 1.74 K/UL (ref 1.2–3.5)
LYMPHOCYTES NFR BLD: 34.7 %
MAGNESIUM SERPL-MCNC: 2.1 MG/DL (ref 1.8–2.5)
MCH RBC QN AUTO: 34.2 PG (ref 28–33.2)
MCHC RBC AUTO-ENTMCNC: 34.5 G/DL (ref 32.2–35.5)
MCV RBC AUTO: 99.3 FL (ref 83–98)
MONOCYTES # BLD: 0.32 K/UL (ref 0.28–0.8)
MONOCYTES NFR BLD: 6.4 %
NEUTROPHILS # BLD: 2.85 K/UL (ref 1.7–7)
NEUTS SEG NFR BLD: 56.9 %
NITRITE UR QL STRIP.AUTO: NEGATIVE
NRBC BLD-RTO: 0 %
PDW BLD AUTO: 9.7 FL (ref 9.4–12.3)
PH UR STRIP.AUTO: 7 [PH]
PLATELET # BLD AUTO: 212 K/UL (ref 150–369)
POTASSIUM SERPL-SCNC: 3.8 MEQ/L (ref 3.5–5.1)
PROT SERPL-MCNC: 6.5 G/DL (ref 6–8.2)
PROT UR QL STRIP.AUTO: NEGATIVE
RBC # BLD AUTO: 4.03 M/UL (ref 3.93–5.22)
SALICYLATES SERPL-MCNC: <1.5 MG/DL
SODIUM SERPL-SCNC: 135 MEQ/L (ref 136–145)
SP GR UR REFRACT.AUTO: 1.01
TROPONIN I SERPL HS-MCNC: <2 PG/ML
UROBILINOGEN UR STRIP-ACNC: 0.2 EU/DL
WBC # BLD AUTO: 5.01 K/UL (ref 3.8–10.5)

## 2024-09-19 PROCEDURE — 96374 THER/PROPH/DIAG INJ IV PUSH: CPT

## 2024-09-19 PROCEDURE — 25800000 HC PHARMACY IV SOLUTIONS: Performed by: PHYSICIAN ASSISTANT

## 2024-09-19 PROCEDURE — 99284 EMERGENCY DEPT VISIT MOD MDM: CPT | Mod: 25

## 2024-09-19 PROCEDURE — 83735 ASSAY OF MAGNESIUM: CPT | Performed by: PHYSICIAN ASSISTANT

## 2024-09-19 PROCEDURE — 63600000 HC DRUGS/DETAIL CODE: Mod: JZ | Performed by: PHYSICIAN ASSISTANT

## 2024-09-19 PROCEDURE — 81003 URINALYSIS AUTO W/O SCOPE: CPT | Mod: 59 | Performed by: EMERGENCY MEDICINE

## 2024-09-19 PROCEDURE — 63700000 HC SELF-ADMINISTRABLE DRUG: Performed by: PHYSICIAN ASSISTANT

## 2024-09-19 PROCEDURE — 84484 ASSAY OF TROPONIN QUANT: CPT | Performed by: EMERGENCY MEDICINE

## 2024-09-19 PROCEDURE — 3E033GC INTRODUCTION OF OTHER THERAPEUTIC SUBSTANCE INTO PERIPHERAL VEIN, PERCUTANEOUS APPROACH: ICD-10-PCS | Performed by: EMERGENCY MEDICINE

## 2024-09-19 PROCEDURE — 84703 CHORIONIC GONADOTROPIN ASSAY: CPT | Performed by: EMERGENCY MEDICINE

## 2024-09-19 PROCEDURE — 70450 CT HEAD/BRAIN W/O DYE: CPT

## 2024-09-19 PROCEDURE — 36415 COLL VENOUS BLD VENIPUNCTURE: CPT | Performed by: EMERGENCY MEDICINE

## 2024-09-19 PROCEDURE — 96361 HYDRATE IV INFUSION ADD-ON: CPT

## 2024-09-19 PROCEDURE — 80053 COMPREHEN METABOLIC PANEL: CPT | Performed by: EMERGENCY MEDICINE

## 2024-09-19 PROCEDURE — G0480 DRUG TEST DEF 1-7 CLASSES: HCPCS | Performed by: PHYSICIAN ASSISTANT

## 2024-09-19 PROCEDURE — 71046 X-RAY EXAM CHEST 2 VIEWS: CPT

## 2024-09-19 PROCEDURE — 3E0337Z INTRODUCTION OF ELECTROLYTIC AND WATER BALANCE SUBSTANCE INTO PERIPHERAL VEIN, PERCUTANEOUS APPROACH: ICD-10-PCS | Performed by: EMERGENCY MEDICINE

## 2024-09-19 PROCEDURE — 93005 ELECTROCARDIOGRAM TRACING: CPT | Performed by: EMERGENCY MEDICINE

## 2024-09-19 PROCEDURE — 85025 COMPLETE CBC W/AUTO DIFF WBC: CPT | Performed by: EMERGENCY MEDICINE

## 2024-09-19 RX ORDER — ONDANSETRON HYDROCHLORIDE 2 MG/ML
4 INJECTION, SOLUTION INTRAVENOUS ONCE
Status: COMPLETED | OUTPATIENT
Start: 2024-09-19 | End: 2024-09-19

## 2024-09-19 RX ORDER — ACETAMINOPHEN 325 MG/1
650 TABLET ORAL ONCE
Status: COMPLETED | OUTPATIENT
Start: 2024-09-19 | End: 2024-09-19

## 2024-09-19 RX ADMIN — SODIUM CHLORIDE 1000 ML: 9 INJECTION, SOLUTION INTRAVENOUS at 09:55

## 2024-09-19 RX ADMIN — ACETAMINOPHEN 325MG 650 MG: 325 TABLET ORAL at 09:56

## 2024-09-19 RX ADMIN — ONDANSETRON 4 MG: 2 INJECTION INTRAMUSCULAR; INTRAVENOUS at 09:56

## 2024-09-19 NOTE — DISCHARGE INSTRUCTIONS
Be sure to drink plenty of fluids and stay well hydrated, especially things with electrolytes like smart water, gatorade, pedialyte, etc   Change positions slowly  Take it easy today     Your CT scan showed no severe trauma from the head injury today.  You have been diagnosed with a minor brain injury.  Work-up for signs for concussion which include symptoms like brain fog, nausea, mild headache, etc    Brain rest is the main treatment for concussion.  This includes limiting screen time (phones, TVs, computers, etc.) and physical rest.     You can take 600 mg motrin or 650 mg tylenol every 6 hours as needed for pain  Please use ice.  Please do not apply directly to the skin.  Please do not leave in place while asleep.  Please place for 10-15 minutes and then remove it for 10-15 minutes  Stay well hydrated     Follow-up with concussion clinic for further evaluation  Follow-up with your primary care doctor for further evaluation    It is important that you seek re-evaluation by a healthcare provider if you experience any of the following:  Changes in mental state or alertness  Throwing up again and again  Very bad headache that starts suddenly or rapidly worsens  Signs of a stroke such as trouble seeing, severe dizziness, weakness or numbness of your face, arm, or leg, especially if only on one side of your body.

## 2024-09-19 NOTE — ED PROVIDER NOTES
"Emergency Medicine Note  HPI   HISTORY OF PRESENT ILLNESS     48-year-old female with past medical history of asthma, migraines, perioral dermatitis presents after syncopal episode.  Patient felt okay when she woke up this morning but then 2 minutes later started feeling lightheaded and not well.  She was in the kitchen making her son breakfast when she synopsized and hit her head.  States since Then Her Body Has Been Feeling \"Fuzzy\" and Tingling in Her Arms and Legs.  Endorses nausea and headache.  States she is worse photosensitivity than normal.  Denies double vision or loss of vision, neck pain, chest pain, shortness of breath, abd pain, vomiting, focal weakness.  Reports she has been under a lot of stress.          Patient History   PAST HISTORY     Reviewed from Nursing Triage:       Past Medical History:   Diagnosis Date    Asthma     Migraines     Perioral dermatitis        Past Surgical History   Procedure Laterality Date    Cholecystectomy         Family History   Problem Relation Name Age of Onset    Thyroid disease Biological Mother      Hypertension Biological Mother      Depression Biological Sister         Social History     Tobacco Use    Smoking status: Never    Smokeless tobacco: Never   Substance Use Topics    Alcohol use: Not Currently     Comment: last drink 6 weeks ago.      Drug use: Never         Review of Systems   REVIEW OF SYSTEMS     Review of Systems      VITALS     ED Vitals      Date/Time Temp Pulse Resp BP SpO2 [facility]   09/19/24 1154 36.3 °C (97.3 °F) -- -- -- 100 % MMM   09/19/24 1138 -- 72 31 104/61 100 % MMM   09/19/24 1027 -- 78 18 94/62 -- MMM   09/19/24 0914 36.5 °C (97.7 °F) 79 18 78/60 98 % KMG          Pulse Ox %: 98 % (09/19/24 0950)  Pulse Ox Interpretation: Normal (09/19/24 0950)  Heart Rate: 71 (09/19/24 0950)  Rhythm Strip Interpretation: Normal Sinus Rhythm (09/19/24 0950)     Physical Exam   PHYSICAL EXAM     Physical Exam  Vitals and nursing note reviewed. "   Constitutional:       General: She is not in acute distress.     Appearance: She is well-developed.   HENT:      Head: Atraumatic.   Eyes:      Extraocular Movements: Extraocular movements intact.      Conjunctiva/sclera: Conjunctivae normal.      Pupils: Pupils are equal, round, and reactive to light.   Cardiovascular:      Rate and Rhythm: Normal rate and regular rhythm.   Pulmonary:      Effort: Pulmonary effort is normal.      Breath sounds: Normal breath sounds.   Abdominal:      General: Bowel sounds are normal.      Palpations: Abdomen is soft.      Tenderness: There is no abdominal tenderness.   Musculoskeletal:         General: No deformity.      Cervical back: Normal range of motion. No tenderness.   Skin:     General: Skin is warm and dry.   Neurological:      Mental Status: She is alert and oriented to person, place, and time. Mental status is at baseline.      Cranial Nerves: No cranial nerve deficit.      Sensory: No sensory deficit.      Motor: No weakness.      Coordination: Coordination normal.   Psychiatric:         Behavior: Behavior normal.           PROCEDURES     Procedures     DATA     Results       Procedure Component Value Units Date/Time    Urinalysis with Reflex Culture [[id number]]  (Normal) Collected: 09/19/24 1109    Specimen: Urine, Clean Catch Updated: 09/19/24 1120    Narrative:      The following orders were created for panel order Urinalysis with Reflex Culture.  Procedure                               Abnormality         Status                     ---------                               -----------         ------                     UA Reflex to Culture (Ma...[[id number]]  Normal              Final result                 Please view results for these tests on the individual orders.    UA Reflex to Culture (Macroscopic) [[id number]]  (Normal) Collected: 09/19/24 1109    Specimen: Urine, Clean Catch Updated: 09/19/24 1120     Color, Urine Yellow     Clarity, Urine Clear     Specific  Gravity, Urine 1.006     pH, Urine 7.0     Leukocyte Esterase Negative     Comment: Results can be falsely negative due to high specific gravity, some antibiotics, glucose >3 g/dl, or WBC other than neutrophils.        Nitrite, Urine Negative     Protein, Urine Negative     Glucose, Urine Negative mg/dL      Ketones, Urine Negative mg/dL      Urobilinogen, Urine 0.2 EU/dL      Bilirubin, Urine Negative mg/dL      Blood, Urine Negative     Comment: The sensitivity of the occult blood test is equivalent to approximately 4 intact RBC/HPF.       ER toxicology screen, serum [[id number]]  (Abnormal) Collected: 09/19/24 0922    Specimen: Blood, Venous Updated: 09/19/24 1045     Salicylate <1.5 mg/dL      Acetaminophen 0.4 ug/mL      Ethanol <10 mg/dL     HS Troponin I (with 2 hour reflex) [[id number]]  (Normal) Collected: 09/19/24 0922    Specimen: Blood, Venous Updated: 09/19/24 1018     High Sens Troponin I <2.0 pg/mL     Comprehensive metabolic panel [[id number]]  (Abnormal) Collected: 09/19/24 0922    Specimen: Blood, Venous Updated: 09/19/24 0958     Sodium 135 mEQ/L      Potassium 3.8 mEQ/L      Comment: Results obtained on plasma. Plasma Potassium values may be up to 0.4 mEQ/L less than serum values. The differences may be greater for patients with high platelet or white cell counts.        Chloride 103 mEQ/L      CO2 26 mEQ/L      BUN 12 mg/dL      Creatinine 0.9 mg/dL      Glucose 91 mg/dL      Calcium 9.1 mg/dL      AST (SGOT) 21 IU/L      ALT (SGPT) 27 IU/L      Alkaline Phosphatase 44 IU/L      Total Protein 6.5 g/dL      Comment: Test performed on plasma which typically contains approximately 0.4 g/dL more protein than serum.        Albumin 3.8 g/dL      Bilirubin, Total 0.9 mg/dL      eGFR >60.0 mL/min/1.73m*2      Comment: Calculation based on the Chronic Kidney Disease Epidemiology Collaboration (CKD-EPI) equation refit without adjustment for race.        Anion Gap 6 mEQ/L     BhCG, Serum, Qual (if  menstruating female and no urine) [[id number]]  (Normal) Collected: 09/19/24 0922    Specimen: Blood, Venous Updated: 09/19/24 0958     Preg Test, Serum Negative    Magnesium [[id number]]  (Normal) Collected: 09/19/24 0922    Specimen: Blood, Venous Updated: 09/19/24 0958     Magnesium 2.1 mg/dL     CBC and differential [[id number]]  (Abnormal) Collected: 09/19/24 0922    Specimen: Blood, Venous Updated: 09/19/24 0938     WBC 5.01 K/uL      RBC 4.03 M/uL      Hemoglobin 13.8 g/dL      Hematocrit 40.0 %      MCV 99.3 fL      MCH 34.2 pg      MCHC 34.5 g/dL      RDW 12.7 %      Platelets 212 K/uL      MPV 9.7 fL      Differential Type Auto     nRBC 0.0 %      Immature Granulocytes 0.2 %      Neutrophils 56.9 %      Lymphocytes 34.7 %      Monocytes 6.4 %      Eosinophils 1.0 %      Basophils 0.8 %      Immature Granulocytes, Absolute 0.01 K/uL      Neutrophils, Absolute 2.85 K/uL      Lymphocytes, Absolute 1.74 K/uL      Monocytes, Absolute 0.32 K/uL      Eosinophils, Absolute 0.05 K/uL      Basophils, Absolute 0.04 K/uL     RAINBOW LT BLUE [[id number]] Collected: 09/19/24 0922    Specimen: Blood, Venous Updated: 09/19/24 0928    [place] Draw Panel [[id number]] Collected: 09/19/24 0922    Specimen: Blood, Venous Updated: 09/19/24 0928    Narrative:      The following orders were created for panel order [place] Draw Panel.  Procedure                               Abnormality         Status                     ---------                               -----------         ------                     RAINBOW RED[[id number]]                                      In process                 RAINBOW LT BLUE[[id number]]                                  In process                 RAINBOW GOLD[[id number]]                                                                  Please view results for these tests on the individual orders.    RAINBOW RED [[id number]] Collected: 09/19/24 0922    Specimen: Blood, Venous Updated: 09/19/24 0928             Imaging Results              CT HEAD WITHOUT IV CONTRAST (Final result)  Result time 09/19/24 09:58:26      Final result                   Impression:    IMPRESSION:  Mild inflammatory stranding in the left occipital subcutaneous soft tissues.  No  evidence of an underlying depressed calvarial fracture and no CT evidence of  underlying acute traumatic injury to the brain.               Narrative:      CLINICAL HISTORY:  Dizziness with syncopal episode and injury to the back of the  head.    COMMENT:  An unenhanced CT scan of the brain was performed from the foramen magnum to the  vertex. Coronal and sagittal reconstructions were obtained.    CT DOSE:  One or more dose reduction techniques (e.g. automated exposure  control, adjustment of the mA and/or kV according to patient size, use of  iterative reconstruction technique) utilized for this examination.    Comparison: 5/31/2023 and MRI performed 2/10/2022      Findings:  The ventricles, sulci and cisternal spaces are unremarkable.  There is no loss  of the gray-white matter differentiation to suggest an acute large vascular  territorial infarction.  No acute intracranial hemorrhage, intra-axial mass  effect or extra-axial fluid collection is identified.    Mild inflammatory stranding is present in the left occipital subcutaneous soft  tissues.  There is no evidence of an underlying depressed calvarial fracture.  The visualized portions of the paranasal sinuses and mastoid air cells appear  essentially patent.                                         X-RAY CHEST 2 VIEWS (Final result)  Result time 09/19/24 10:04:07      Final result                   Impression:    IMPRESSION: No acute disease in the chest.                 Narrative:    CLINICAL HISTORY:  Chest Pain/Arrhythmia   .    COMMENT: Frontal erect PA and lateral views of the chest obtained 9/19/2024 9:34  AM.    COMPARISON: None.      The lung fields are clear. No pleural effusion or pneumothorax.   "The  cardiomediastinal contours are within normal limits.  The pulmonary vasculature  is within normal limits.    No abnormalities are noted in the upper abdomen.  There is no acute osseous  abnormality.                                      ECG 12 lead   Independent Interpretation by ED Provider   Normal sinus rhythm rate 71 bpm normal axis narrow QRS no ST segment changes          Scoring tools                                  ED Course & MDM   MDM / ED COURSE / CLINICAL IMPRESSION / DISPO     Medical Decision Making  Problems Addressed:  Lightheadedness: acute illness or injury  Syncope and collapse: acute illness or injury    Amount and/or Complexity of Data Reviewed  Labs: ordered.  Radiology: ordered. Decision-making details documented in ED Course.  ECG/medicine tests: ordered and independent interpretation performed.    Risk  OTC drugs.  Prescription drug management.        ED Course as of 09/19/24 2023   Thu Sep 19, 2024   1001 CT HEAD WITHOUT IV CONTRAST  IMPRESSION:  Mild inflammatory stranding in the left occipital subcutaneous soft tissues.  No  evidence of an underlying depressed calvarial fracture and no CT evidence of  underlying acute traumatic injury to the brain.   [DB]   1011 X-RAY CHEST 2 VIEWS  IMPRESSION: No acute disease in the chest. [DB]   1123 Pt walked to the bathroom and feels improved. Body still feels \"fuzzy\". Discussed results. Pt feels comfortable going home. Discussed hydration and rest. Discussed concussions. All questions answered  [DB]      ED Course User Index  [DB] Charissa Hill PA C     Clinical Impression      Syncope and collapse   Lightheadedness     _________________       ED Disposition   Discharge                       Charissa Hill PA C  09/19/24 2023    "

## 2024-09-19 NOTE — ED ATTESTATION NOTE
"Procedures  Physical Exam  Review of Systems    9/19/20249:47 AM  I have personally seen and examined the patient.  I personally performed the key   components of the encounter and provided a substantive portion of the care and   medical decision making for this patient.     I have reviewed and agree with the PA/NP/Resident's assessment and ED plan of care, with any exceptions as documented below.  My examination, assessment and plan of care of Chel Price is as follows:    Patient is a 48-year-old female who presents after a syncopal event, patient reports waking up feeling dizzy and having a \"head rush\", she continued about her activities this morning and was helping her son get something to eat when she experienced a syncopal event, patient has no history of seizures, no known cardiac issues, no shortness of breath or chest discomfort prior to the event, no back or abdominal discomfort    Exam:   Vital signs have been reviewed, pulse ox is 98% on room air, normal  Heart: RRR, normal S1/S2  Lungs: CTA bilaterally  Abdo: soft, non-tender    Plan/Medical Decision Making: Patient is a 48-year-old female who presents after a syncopal event, checking labs and imaging, reevaluate afterwards      This document was created using Dragon Dictation software. There might be some typographical errors due to this technology.          Godshall, Duane K, MD  09/19/24 0923    "

## 2024-09-20 LAB
ATRIAL RATE: 71
P AXIS: 77
PR INTERVAL: 158
QRS DURATION: 82
QT INTERVAL: 390
QTC CALCULATION(BAZETT): 423
R AXIS: 83
T WAVE AXIS: 68
VENTRICULAR RATE: 71

## 2025-07-07 ENCOUNTER — HOSPITAL ENCOUNTER (OUTPATIENT)
Dept: CARDIOLOGY | Facility: HOSPITAL | Age: 50
Discharge: HOME | End: 2025-07-07
Attending: PSYCHIATRY & NEUROLOGY
Payer: COMMERCIAL

## 2025-07-07 PROCEDURE — 95816 EEG AWAKE AND DROWSY: CPT | Mod: 26 | Performed by: STUDENT IN AN ORGANIZED HEALTH CARE EDUCATION/TRAINING PROGRAM

## 2025-07-07 PROCEDURE — 95816 EEG AWAKE AND DROWSY: CPT
